# Patient Record
Sex: FEMALE | Race: WHITE | NOT HISPANIC OR LATINO | Employment: FULL TIME | URBAN - METROPOLITAN AREA
[De-identification: names, ages, dates, MRNs, and addresses within clinical notes are randomized per-mention and may not be internally consistent; named-entity substitution may affect disease eponyms.]

---

## 2017-03-21 ENCOUNTER — OFFICE VISIT (OUTPATIENT)
Dept: FAMILY MEDICINE | Facility: CLINIC | Age: 52
End: 2017-03-21
Payer: COMMERCIAL

## 2017-03-21 VITALS
RESPIRATION RATE: 15 BRPM | HEIGHT: 68 IN | OXYGEN SATURATION: 96 % | BODY MASS INDEX: 36.98 KG/M2 | DIASTOLIC BLOOD PRESSURE: 88 MMHG | HEART RATE: 79 BPM | TEMPERATURE: 98.4 F | WEIGHT: 244 LBS | SYSTOLIC BLOOD PRESSURE: 122 MMHG

## 2017-03-21 DIAGNOSIS — Z23 ENCOUNTER FOR IMMUNIZATION: ICD-10-CM

## 2017-03-21 DIAGNOSIS — E78.5 HYPERLIPIDEMIA LDL GOAL <130: ICD-10-CM

## 2017-03-21 DIAGNOSIS — Z12.4 PAP SMEAR FOR CERVICAL CANCER SCREENING: ICD-10-CM

## 2017-03-21 DIAGNOSIS — F41.9 ANXIETY: ICD-10-CM

## 2017-03-21 DIAGNOSIS — Z90.89 S/P PARATHYROIDECTOMY: ICD-10-CM

## 2017-03-21 DIAGNOSIS — Z12.31 VISIT FOR SCREENING MAMMOGRAM: ICD-10-CM

## 2017-03-21 DIAGNOSIS — Z72.0 TOBACCO ABUSE: ICD-10-CM

## 2017-03-21 DIAGNOSIS — Z12.4 SCREENING FOR MALIGNANT NEOPLASM OF CERVIX: ICD-10-CM

## 2017-03-21 DIAGNOSIS — Z80.0 FAMILY HISTORY OF COLON CANCER: ICD-10-CM

## 2017-03-21 DIAGNOSIS — G56.03 BILATERAL CARPAL TUNNEL SYNDROME: ICD-10-CM

## 2017-03-21 DIAGNOSIS — Z11.59 NEED FOR HEPATITIS C SCREENING TEST: ICD-10-CM

## 2017-03-21 DIAGNOSIS — Z00.00 ROUTINE GENERAL MEDICAL EXAMINATION AT A HEALTH CARE FACILITY: Primary | ICD-10-CM

## 2017-03-21 DIAGNOSIS — Z12.11 SCREEN FOR COLON CANCER: ICD-10-CM

## 2017-03-21 DIAGNOSIS — Z98.890 S/P PARATHYROIDECTOMY: ICD-10-CM

## 2017-03-21 DIAGNOSIS — Z23 NEED FOR PROPHYLACTIC VACCINATION AGAINST STREPTOCOCCUS PNEUMONIAE (PNEUMOCOCCUS): ICD-10-CM

## 2017-03-21 LAB
ALP SERPL-CCNC: 77 U/L (ref 40–150)
CALCIUM SERPL-MCNC: 8.9 MG/DL (ref 8.5–10.1)
CHOLEST SERPL-MCNC: 202 MG/DL
GLUCOSE SERPL-MCNC: 78 MG/DL (ref 70–99)
HDLC SERPL-MCNC: 48 MG/DL
LDLC SERPL CALC-MCNC: 138 MG/DL
NONHDLC SERPL-MCNC: 154 MG/DL
PTH-INTACT SERPL-MCNC: 41 PG/ML (ref 12–72)
TRIGL SERPL-MCNC: 81 MG/DL
TSH SERPL DL<=0.005 MIU/L-ACNC: 1.04 MU/L (ref 0.4–4)

## 2017-03-21 PROCEDURE — 82947 ASSAY GLUCOSE BLOOD QUANT: CPT | Performed by: FAMILY MEDICINE

## 2017-03-21 PROCEDURE — 83970 ASSAY OF PARATHORMONE: CPT | Performed by: FAMILY MEDICINE

## 2017-03-21 PROCEDURE — 36415 COLL VENOUS BLD VENIPUNCTURE: CPT | Performed by: FAMILY MEDICINE

## 2017-03-21 PROCEDURE — 99000 SPECIMEN HANDLING OFFICE-LAB: CPT | Performed by: FAMILY MEDICINE

## 2017-03-21 PROCEDURE — 80061 LIPID PANEL: CPT | Performed by: FAMILY MEDICINE

## 2017-03-21 PROCEDURE — G0009 ADMIN PNEUMOCOCCAL VACCINE: HCPCS | Performed by: FAMILY MEDICINE

## 2017-03-21 PROCEDURE — 90732 PPSV23 VACC 2 YRS+ SUBQ/IM: CPT | Performed by: FAMILY MEDICINE

## 2017-03-21 PROCEDURE — 87902 NFCT AGT GNTYP ALYS HEP C: CPT | Mod: 90 | Performed by: FAMILY MEDICINE

## 2017-03-21 PROCEDURE — 87522 HEPATITIS C REVRS TRNSCRPJ: CPT | Performed by: FAMILY MEDICINE

## 2017-03-21 PROCEDURE — 84443 ASSAY THYROID STIM HORMONE: CPT | Performed by: FAMILY MEDICINE

## 2017-03-21 PROCEDURE — 87624 HPV HI-RISK TYP POOLED RSLT: CPT | Performed by: FAMILY MEDICINE

## 2017-03-21 PROCEDURE — 99396 PREV VISIT EST AGE 40-64: CPT | Mod: 25 | Performed by: FAMILY MEDICINE

## 2017-03-21 PROCEDURE — G0145 SCR C/V CYTO,THINLAYER,RESCR: HCPCS | Performed by: FAMILY MEDICINE

## 2017-03-21 PROCEDURE — 86803 HEPATITIS C AB TEST: CPT | Performed by: FAMILY MEDICINE

## 2017-03-21 PROCEDURE — 84075 ASSAY ALKALINE PHOSPHATASE: CPT | Performed by: FAMILY MEDICINE

## 2017-03-21 PROCEDURE — 82310 ASSAY OF CALCIUM: CPT | Performed by: FAMILY MEDICINE

## 2017-03-21 RX ORDER — SERTRALINE HYDROCHLORIDE 100 MG/1
TABLET, FILM COATED ORAL
Qty: 30 TABLET | Refills: 6 | Status: SHIPPED | OUTPATIENT
Start: 2017-03-21 | End: 2017-06-21

## 2017-03-21 ASSESSMENT — ANXIETY QUESTIONNAIRES
2. NOT BEING ABLE TO STOP OR CONTROL WORRYING: SEVERAL DAYS
6. BECOMING EASILY ANNOYED OR IRRITABLE: MORE THAN HALF THE DAYS
1. FEELING NERVOUS, ANXIOUS, OR ON EDGE: SEVERAL DAYS
7. FEELING AFRAID AS IF SOMETHING AWFUL MIGHT HAPPEN: SEVERAL DAYS
GAD7 TOTAL SCORE: 8
3. WORRYING TOO MUCH ABOUT DIFFERENT THINGS: SEVERAL DAYS
5. BEING SO RESTLESS THAT IT IS HARD TO SIT STILL: SEVERAL DAYS
IF YOU CHECKED OFF ANY PROBLEMS ON THIS QUESTIONNAIRE, HOW DIFFICULT HAVE THESE PROBLEMS MADE IT FOR YOU TO DO YOUR WORK, TAKE CARE OF THINGS AT HOME, OR GET ALONG WITH OTHER PEOPLE: SOMEWHAT DIFFICULT

## 2017-03-21 ASSESSMENT — PATIENT HEALTH QUESTIONNAIRE - PHQ9: 5. POOR APPETITE OR OVEREATING: SEVERAL DAYS

## 2017-03-21 NOTE — LETTER
[facility]  [address] [place] [address]    March 24, 2017    Xenia Arguello  [address] [facility] [address]          Dear Xenia,  Advised That she make appointment with a Hepatologist at [facility]  Follow Low cholesterol diet.    Results for orders placed or performed in visit on 03/21/17   Hepatitis C Screen Reflex to HCV RNA Quant and Genotype   Result Value Ref Range    Hepatitis C Antibody (A) NR     Reactive   A reactive result indicates one of the following 1) current HCV infection 2)   past HCV infection that has resolved or 3) false positivity. The CDC recommends   that a reactive result should be followed by Nucleic acid testing for HCV RNA.  If HCV RNA is detected, that indicates current HCV infection. If HCV RNA is not   detected, that indicates either past, resolved HCV infection, or false HCV   antibody positivity.   Assay performance characteristics have not been established for newborns,   infants, and children     Lipid panel reflex to direct LDL   Result Value Ref Range    Cholesterol 202 (H) <200 mg/dL    Triglycerides 81 <150 mg/dL    HDL Cholesterol 48 (L) >49 mg/dL    LDL Cholesterol Calculated 138 (H) <100 mg/dL    Non HDL Cholesterol 154 (H) <130 mg/dL   Pap imaged thin layer screen with HPV - recommended age 30 - 65 years (select HPV order below)   Result Value Ref Range    PAP NIL     Copath Report         Patient Name: XENIA ARGUELLO  MR#: [id number]  Specimen #: [id number]  Collected: 3/21/2017  Received: 3/22/2017  Reported: 3/23/2017 12:30  Ordering Phy(s): JATINDER GOMEZ    For improved result formatting, select 'View Enhanced Report Format'  under Linked Documents section.    SPECIMEN/STAIN PROCESS:  Pap imaged thin layer prep screening (Surepath, FocalPoint with guided  screening)       Pap-Cyto x 1, HPV ordered x 1    SOURCE: Cervical,  endocervical  ----------------------------------------------------------------   Pap imaged thin layer prep screening (Surepath, FocalPoint with guided  screening)  SPECIMEN ADEQUACY:  Satisfactory for evaluation.  -Transformation zone component absent.    CYTOLOGIC INTERPRETATION:    Negative for Intraepithelial Lesion or Malignancy    Electronically signed out by:  MARIETTA Junior (ASCP)    Processed and screened at [facility]    CLINICAL HISTORY:    Post Menopausal,    Papanicolaou Test  Limitations:  Cervical cytology is a screening test  with limited sensitivity; regular screening is critical for cancer  prevention; Pap tests are primarily effective for the  diagnosis/prevention of squamous cell carcinoma, not adenocarcinomas or  other cancers.    TESTING LAB LOCATION:  [address]  [phone]    COLLECTION SITE:  Client:  [facility]  Location: [facility] ()     Glucose   Result Value Ref Range    Glucose 78 70 - 99 mg/dL   Calcium   Result Value Ref Range    Calcium 8.9 8.5 - 10.1 mg/dL   Parathyroid Hormone Intact   Result Value Ref Range    Parathyroid Hormone Intact 41 12 - 72 pg/mL   Alkaline phosphatase   Result Value Ref Range    Alkaline Phosphatase 77 40 - 150 U/L   TSH with free T4 reflex   Result Value Ref Range    TSH 1.04 0.40 - 4.00 mU/L   Hepatitis C RNA Quantitative   Result Value Ref Range    HCV RNA Quant IU/ml 6344533 (A) HCVND [IU]/mL    Log of HCV RNA Qt 6.8 (H) <1.2 Log IU/mL       If you have any questions or concerns, please me or my clinic team at [phone].      Sincerely,        Maricarmen Stephens MD/bt

## 2017-03-21 NOTE — PROGRESS NOTES
SUBJECTIVE:     CC: Eli Burton is an 51 year old woman who presents for preventive health visit.     Healthy Habits:    Do you get at least three servings of calcium containing foods daily (dairy, green leafy vegetables, etc.)? yes    Amount of exercise or daily activities, outside of work: no    Problems taking medications regularly No    Medication side effects: No    Have you had an eye exam in the past two years? no    Do you see a dentist twice per year? yes    Do you have sleep apnea, excessive snoring or daytime drowsiness?no        Medication refill    Today's PHQ-2 Score:   PHQ-2 ( 1999 Pfizer) 12/15/2016 1/28/2016   Q1: Little interest or pleasure in doing things 0 0   Q2: Feeling down, depressed or hopeless 0 0   PHQ-2 Score 0 0       Abuse: Current or Past(Physical, Sexual or Emotional)- No  Do you feel safe in your environment - Yes    Social History   Substance Use Topics     Smoking status: Current Every Day Smoker     Packs/day: 0.50     Types: Cigarettes     Smokeless tobacco: Never Used     Alcohol use 0.0 oz/week     0 Standard drinks or equivalent per week      Comment: 0-10 light beers weekly     The patient does not drink >3 drinks per day nor >7 drinks per week.    Recent Labs   Lab Test  09/12/14   0756   LDL  170*       Reviewed orders with patient.  Reviewed health maintenance and updated orders accordingly - Yes    Mammo Decision Support:  Patient over age 50, mutual decision to screen reflected in health maintenance.    Pertinent mammograms are reviewed under the imaging tab.  History of abnormal Pap smear: NO - age 30- 65 PAP every 3 years recommended    Reviewed and updated as needed this visit by clinical staff  Tobacco  Allergies  Meds  Med Hx  Surg Hx  Fam Hx  Soc Hx        Reviewed and updated as needed this visit by Provider        Past Medical History   Diagnosis Date     Anxiety      CTS (carpal tunnel syndrome) 7/20/2014     Depression      Headache       Hyperlipidemia LDL goal <130       Past Surgical History   Procedure Laterality Date     Parathyroidectomy N/A 12/23/2014     Procedure: PARATHYROIDECTOMY;  Surgeon: Anusha Chan MD;  Location:  OR       ROS:  C: NEGATIVE for fever, chills, change in weight  I: NEGATIVE for worrisome rashes, moles or lesions  E: NEGATIVE for vision changes or irritation  ENT: NEGATIVE for ear, mouth and throat problems  R: NEGATIVE for significant cough or SOB  B: NEGATIVE for masses, tenderness or discharge  CV: NEGATIVE for chest pain, palpitations or peripheral edema  GI: NEGATIVE for nausea, abdominal pain, heartburn, or change in bowel habits  : NEGATIVE for unusual urinary or vaginal symptoms. No vaginal bleeding.  M: NEGATIVE for significant arthralgias or myalgia  N: NEGATIVE for weakness, dizziness or paresthesias  P: NEGATIVE for changes in mood or affect     Problem list, Medication list, Allergies, and Medical/Social/Surgical histories reviewed in [facility] and updated as appropriate.  Labs reviewed in EPIC  BP Readings from Last 3 Encounters:   03/21/17 122/88   12/15/16 133/85   01/28/16 138/84    Wt Readings from Last 3 Encounters:   03/21/17 244 lb (110.7 kg)   12/15/16 244 lb (110.7 kg)   01/28/16 239 lb 9.6 oz (108.7 kg)                  Patient Active Problem List   Diagnosis     Tobacco abuse     Anxiety     CTS (carpal tunnel syndrome)     Trigger finger, acquired     S/P parathyroidectomy (H)     Hyperlipidemia LDL goal <130     Family history of colon cancer     Past Surgical History   Procedure Laterality Date     Parathyroidectomy N/A 12/23/2014     Procedure: PARATHYROIDECTOMY;  Surgeon: Anusha Chan MD;  Location:  OR       Social History   Substance Use Topics     Smoking status: Current Every Day Smoker     Packs/day: 0.50     Types: Cigarettes     Smokeless tobacco: Never Used     Alcohol use 0.0 oz/week     0 Standard drinks or equivalent per week      Comment: 0-10 light beers  "weekly     Family History   Problem Relation Age of Onset     Breast Cancer Maternal Grandmother      Cancer - colorectal Maternal Grandfather      CEREBROVASCULAR DISEASE No family hx of          Current Outpatient Prescriptions   Medication Sig Dispense Refill     sertraline (ZOLOFT) 100 MG tablet TAKE ONE TABLET BY MOUTH EVERY DAY -DUE FOR PHQ9 BEFORE FURTHER REFILLS 30 tablet 6     Multiple Vitamins-Minerals (MULTIVITAMIN OR) Take 1 tablet by mouth daily       [DISCONTINUED] sertraline (ZOLOFT) 100 MG tablet TAKE ONE TABLET BY MOUTH EVERY DAY -DUE FOR PHQ9 BEFORE FURTHER REFILLS 30 tablet 0     No Known Allergies  Recent Labs   Lab Test  07/23/15   0959  09/12/14   0756   LDL   --   170*   ALT  43   --    CR  0.69  0.85   GFRESTIMATED  90  71   GFRESTBLACK  >90   GFR Calc    86   POTASSIUM  4.6  4.5      OBJECTIVE:     /90  Pulse 79  Temp 98.4  F (36.9  C)  Resp 15  Ht 5' 7.9\" (1.725 m)  Wt 244 lb (110.7 kg)  SpO2 96%  BMI 37.21 kg/m2  EXAM:  GENERAL APPEARANCE: healthy, alert and no distress  EYES: Eyes grossly normal to inspection, PERRL and conjunctivae and sclerae normal  HENT: ear canals and TM's normal, nose and mouth without ulcers or lesions, oropharynx clear and oral mucous membranes moist  NECK: no adenopathy, no asymmetry, masses, or scars and thyroid normal to palpation  RESP: lungs clear to auscultation - no rales, rhonchi or wheezes  BREAST: normal without masses, tenderness or nipple discharge and no palpable axillary masses or adenopathy  CV: regular rate and rhythm, normal S1 S2, no S3 or S4, no murmur, click or rub, no peripheral edema and peripheral pulses strong  ABDOMEN: soft, nontender, no hepatosplenomegaly, no masses and bowel sounds normal   (female): normal female external genitalia, normal urethral meatus, vaginal mucosal atrophy noted, normal cervix, adnexae, and uterus without masses or abnormal discharge  MS: no musculoskeletal defects are noted and gait is " age appropriate without ataxia  SKIN: no suspicious lesions or rashes  NEURO: Normal strength and tone, sensory exam grossly normal, mentation intact and speech normal  PSYCH: mentation appears normal and affect normal/bright    ASSESSMENT/PLAN:     1. Routine general medical examination at a health care facility    - Glucose  - TSH with free T4 reflex    2. Anxiety  Refilled  Pt doing well  See DAVID  - sertraline (ZOLOFT) 100 MG tablet; TAKE ONE TABLET BY MOUTH EVERY DAY -DUE FOR PHQ9 BEFORE FURTHER REFILLS  Dispense: 30 tablet; Refill: 6    3. Screening for malignant neoplasm of cervix  Pap done    4. Need for hepatitis C screening test  Advised   - Hepatitis C Screen Reflex to HCV RNA Quant and Genotype    5. Need for prophylactic vaccination against Streptococcus pneumoniae (pneumococcus)  Advised     6. Hyperlipidemia LDL goal <130  Will check  - Lipid panel reflex to direct LDL    7. Tobacco abuse  Advised quit-Pt declined    8. Encounter for immunization  Advised   - PNEUMOVOCCAL VACCINE 23 VALENT (PNEUMOVAX 23)    9. Bilateral carpal tunnel syndrome  Mild  Avoid repetitive activities    10. Pap smear for cervical cancer screening  done  - Pap imaged thin layer screen with HPV - recommended age 30 - 65 years (select HPV order below)  - HPV High Risk Types DNA Cervical    11. Visit for screening mammogram  Advised   - *MA Screening Digital Bilateral; Future    12. S/P parathyroidectomy (H)  Will check labs  - Calcium  - Parathyroid Hormone Intact  - Alkaline phosphatase    13. Family history of colon cancer  Advised importance of colonoscopy as she has Family history  - GASTROENTEROLOGY ADULT REF PROCEDURE ONLY    14. Screen for colon cancer  Advised   - GASTROENTEROLOGY ADULT REF PROCEDURE ONLY    COUNSELING:   Reviewed preventive health counseling, as reflected in patient instructions       Regular exercise       Healthy diet/nutrition       Vision screening       Osteoporosis Prevention/Bone Health        "Colon cancer screening       Consider lung cancer screening for ages 55-80 years and 30 pack-year smoking history        One time pneumovax for smokers       The ASCVD Risk score (Jade DIAL Jr., et al., 2013) failed to calculate for the following reasons:    Cannot find a previous HDL lab    Cannot find a previous total cholesterol lab         reports that she has been smoking Cigarettes.  She has been smoking about 0.50 packs per day. She has never used smokeless tobacco.  Tobacco Cessation Action Plan: Information offered: Patient not interested at this time  Estimated body mass index is 37.21 kg/(m^2) as calculated from the following:    Height as of this encounter: 5' 7.9\" (1.725 m).    Weight as of this encounter: 244 lb (110.7 kg).   Weight management plan: low agus diet/exercise    Counseling Resources:  ATP IV Guidelines  Pooled Cohorts Equation Calculator  Breast Cancer Risk Calculator  FRAX Risk Assessment  ICSI Preventive Guidelines  Dietary Guidelines for Americans, 2010  USDA's MyPlate  ASA Prophylaxis  Lung CA Screening    Maricarmen Stephens MD  [facility]  "

## 2017-03-21 NOTE — MR AVS SNAPSHOT
After Visit Summary   3/21/2017    Eli Burton    MRN: [id number]           Patient Information     Date Of Birth          1965        Visit Information        Provider Department      3/21/2017 1:40 PM Maricarmen Stephens MD [facility]        Today's Diagnoses     Routine general medical examination at a health care facility    -  1    Anxiety        Screening for malignant neoplasm of cervix        Need for hepatitis C screening test        Need for prophylactic vaccination against Streptococcus pneumoniae (pneumococcus)        Hyperlipidemia LDL goal <130        Tobacco abuse        Encounter for immunization        Bilateral carpal tunnel syndrome        Pap smear for cervical cancer screening        Visit for screening mammogram        S/P parathyroidectomy (H)        Family history of colon cancer        Screen for colon cancer          Care Instructions    [facility]    If you have any questions regarding to your visit please contact your care team:       Team Red:   Clinic Hours Telephone Number   Dr. Constanza Reynoso  (pediatrics)  Kelly Bassett NP 7am-7pm  Monday - Thursday   7am-5pm  Fridays  [phone] (fax)    Harvey DALTON  [phone]   Urgent Care - [place] and [place] Monday-Friday  [place] - 11am-8pm  Saturday-Sunday  Both sites - 9am-5pm  [phone] - [facility]  [phone] - [place]       What options do I have for visits at the clinic other than the traditional office visit?  To expand how we care for you, many of our providers are utilizing electronic visits (e-visits) and telephone visits, when medically appropriate, for interactions with their patients rather than a visit in the clinic.   We also offer nurse visits for many medical concerns. Just like any other service, we will bill your insurance company for this type of visit based on time spent on the phone with your  provider. Not all insurance companies cover these visits. Please check with your medical insurance if this type of visit is covered. You will be responsible for any charges that are not paid by your insurance.      E-visits via [employer]:  generally incur a $35.00 fee.  Telephone visits:  Time spent on the phone: *charged based on time that is spent on the phone in increments of 10 minutes. Estimated cost:   5-10 mins $30.00   11-20 mins. $59.00   21-30 mins. $85.00     As always, Thank you for trusting us with your health care needs!      Azeb LIMON MA          Preventive Health Recommendations  Female Ages 50 - 64    Yearly exam: See your health care provider every year in order to  o Review health changes.   o Discuss preventive care.    o Review your medicines if your doctor has prescribed any.      Get a Pap test every three years (unless you have an abnormal result and your provider advises testing more often).    If you get Pap tests with HPV test, you only need to test every 5 years, unless you have an abnormal result.     You do not need a Pap test if your uterus was removed (hysterectomy) and you have not had cancer.    You should be tested each year for STDs (sexually transmitted diseases) if you're at risk.     Have a mammogram every 1 to 2 years.    Have a colonoscopy at age 50, or have a yearly FIT test (stool test). These exams screen for colon cancer.      Have a cholesterol test every 5 years, or more often if advised.    Have a diabetes test (fasting glucose) every three years. If you are at risk for diabetes, you should have this test more often.     If you are at risk for osteoporosis (brittle bone disease), think about having a bone density scan (DEXA).    Shots: Get a flu shot each year. Get a tetanus shot every 10 years.    Nutrition:     Eat at least 5 servings of fruits and vegetables each day.    Eat whole-grain bread, whole-wheat pasta and brown rice instead of white grains and rice.    Talk  to your provider about Calcium and Vitamin D.     Lifestyle    Exercise at least 150 minutes a week (30 minutes a day, 5 days a week). This will help you control your weight and prevent disease.    Limit alcohol to one drink per day.    No smoking.     Wear sunscreen to prevent skin cancer.     See your dentist every six months for an exam and cleaning.    See your eye doctor every 1 to 2 years.          Follow-ups after your visit        Additional Services     GASTROENTEROLOGY ADULT REF PROCEDURE ONLY       Last Lab Result: Creatinine (mg/dL)       Date                     Value                 07/23/2015               0.69             ----------  Body mass index is 37.21 kg/(m^2).     Needed:  No  Language:  English    Patient will be contacted to schedule procedure.     Please be aware that coverage of these services is subject to the terms and limitations of your health insurance plan.  Call member services at your health plan with any benefit or coverage questions.  Any procedures must be performed at a [place] facility OR coordinated by your clinic's referral office.    Please bring the following with you to your appointment:    (1) Any X-Rays, CTs or MRIs which have been performed.  Contact the facility where they were done to arrange for  prior to your scheduled appointment.    (2) List of current medications   (3) This referral request   (4) Any documents/labs given to you for this referral                  Future tests that were ordered for you today     Open Future Orders        Priority Expected Expires Ordered    *MA Screening Digital Bilateral Routine  3/21/2018 3/21/2017            Who to contact     If you have questions or need follow up information about today's clinic visit or your schedule please contact [facility] directly at [phone].  Normal or non-critical lab and imaging results will be communicated to you by MyChart, letter or phone within 4 business  "days after the clinic has received the results. If you do not hear from us within 7 days, please contact the clinic through iWarda or phone. If you have a critical or abnormal lab result, we will notify you by phone as soon as possible.  Submit refill requests through i[employer]arda or call your pharmacy and they will forward the refill request to us. Please allow 3 business days for your refill to be completed.          Additional Information About Your Visit        iWarda Information     iWarda lets you send messages to your doctor, view your test results, renew your prescriptions, schedule appointments and more. To sign up, go to [email or web address] . Click on \"Log in\" on the left side of the screen, which will take you to the Welcome page. Then click on \"Sign up Now\" on the right side of the page.     You will be asked to enter the access code listed below, as well as some personal information. Please follow the directions to create your username and password.     Your access code is: [email or web address]  Expires: 2017  2:29 PM     Your access code will  in 90 days. If you need help or a new code, please call your [place] clinic or [phone].        Care EveryWhere ID     This is your Care EveryWhere ID. This could be used by other organizations to access your [place] medical records  [id number]        Your Vitals Were     Pulse Temperature Respirations Height Pulse Oximetry BMI (Body Mass Index)    79 98.4  F (36.9  C) 15 5' 7.9\" (1.725 m) 96% 37.21 kg/m2       Blood Pressure from Last 3 Encounters:   17 122/88   12/15/16 133/85   16 138/84    Weight from Last 3 Encounters:   17 244 lb (110.7 kg)   12/15/16 244 lb (110.7 kg)   16 239 lb 9.6 oz (108.7 kg)              We Performed the Following     Alkaline phosphatase     Calcium     GASTROENTEROLOGY ADULT REF PROCEDURE ONLY     Glucose     Hepatitis C Screen Reflex to HCV RNA Quant and Genotype     HPV High Risk Types DNA " Cervical     Lipid panel reflex to direct LDL     Pap imaged thin layer screen with HPV - recommended age 30 - 65 years (select HPV order below)     Parathyroid Hormone Intact     PNEUMOVOCCAL VACCINE 23 VALENT (PNEUMOVAX 23)     TSH with free T4 reflex          Today's Medication Changes          These changes are accurate as of: 3/21/17  2:29 PM.  If you have any questions, ask your nurse or doctor.               These medicines have changed or have updated prescriptions.        Dose/Directions    sertraline 100 MG tablet   Commonly known as:  ZOLOFT   This may have changed:  See the new instructions.   Used for:  Anxiety   Changed by:  Maricarmen Stephens MD        TAKE ONE TABLET BY MOUTH EVERY DAY -DUE FOR PHQ9 BEFORE FURTHER REFILLS   Quantity:  30 tablet   Refills:  6            Where to get your medicines      These medications were sent to [facility] [address] [place] [address] [address], [facility] [address]     Phone:  [phone]     sertraline 100 MG tablet                Primary Care Provider Office Phone # Fax #    Wei Ybarra PA-C [phone]       [address] [address]        Thank you!     Thank you for choosing [facility]  for your care. Our goal is always to provide you with excellent care. Hearing back from our patients is one way we can continue to improve our services. Please take a few minutes to complete the written survey that you may receive in the mail after your visit with us. Thank you!             Your Updated Medication List - Protect others around you: Learn how to safely use, store and throw away your medicines at www.disposemymeds.org.          This list is accurate as of: 3/21/17  2:29 PM.  Always use your most recent med list.                   Brand Name Dispense Instructions for use    MULTIVITAMIN PO      Take 1 tablet by mouth daily       sertraline 100  MG tablet    ZOLOFT    30 tablet    TAKE ONE TABLET BY MOUTH EVERY DAY -DUE FOR PHQ9 BEFORE FURTHER REFILLS

## 2017-03-21 NOTE — LETTER
[facility]  [address] [address]  [phone]      March 29, 2017    Eli Burton  [address] [facility] [address]    Dear Eli,  We are happy to inform you that your PAP smear result from 3/21/17 is normal.  We are now able to do a follow up test on PAP smears. The DNA test is for HPV (Human Papilloma Virus). Cervical cancer is closely linked with certain types of HPV. Your result showed no evidence of high risk HPV.  Therefore we recommend you return in 3 years for your next pap smear.  You will still need to return to the clinic every year for an annual exam and other preventive tests.  Please contact the clinic with any questions.  Sincerely,  Maricarmen Stephens MD/giacomo

## 2017-03-21 NOTE — NURSING NOTE
"Chief Complaint   Patient presents with     Physical       Initial /90  Pulse 79  Temp 98.4  F (36.9  C)  Resp 15  Ht 5' 7.9\" (1.725 m)  Wt 244 lb (110.7 kg)  SpO2 96%  BMI 37.21 kg/m2 Estimated body mass index is 37.21 kg/(m^2) as calculated from the following:    Height as of this encounter: 5' 7.9\" (1.725 m).    Weight as of this encounter: 244 lb (110.7 kg).  Medication Reconciliation: complete     Bhavin Martinez. MA      "

## 2017-03-21 NOTE — PATIENT INSTRUCTIONS
[facility]    If you have any questions regarding to your visit please contact your care team:       Team Red:   Clinic Hours Telephone Number   Dr. Constanza Reynoso  (pediatrics)  Kelly Bassett NP 7am-7pm  Monday - Thursday   7am-5pm  Fridays  [phone] (fax)    Harvey DALTON  [phone]   Urgent Care - [place] and [place] Monday-Friday  [place] - 11am-8pm  Saturday-Sunday  Both sites - 9am-5pm  [phone] - [facility] [place]  [phone] - [place]       What options do I have for visits at the clinic other than the traditional office visit?  To expand how we care for you, many of our providers are utilizing electronic visits (e-visits) and telephone visits, when medically appropriate, for interactions with their patients rather than a visit in the clinic.   We also offer nurse visits for many medical concerns. Just like any other service, we will bill your insurance company for this type of visit based on time spent on the phone with your provider. Not all insurance companies cover these visits. Please check with your medical insurance if this type of visit is covered. You will be responsible for any charges that are not paid by your insurance.      E-visits via [employer]:  generally incur a $35.00 fee.  Telephone visits:  Time spent on the phone: *charged based on time that is spent on the phone in increments of 10 minutes. Estimated cost:   5-10 mins $30.00   11-20 mins. $59.00   21-30 mins. $85.00     As always, Thank you for trusting us with your health care needs!      Azeb LIMON MA          Preventive Health Recommendations  Female Ages 50 - 64    Yearly exam: See your health care provider every year in order to  o Review health changes.   o Discuss preventive care.    o Review your medicines if your doctor has prescribed any.      Get a Pap test every three years (unless you have an abnormal result and your provider advises testing  more often).    If you get Pap tests with HPV test, you only need to test every 5 years, unless you have an abnormal result.     You do not need a Pap test if your uterus was removed (hysterectomy) and you have not had cancer.    You should be tested each year for STDs (sexually transmitted diseases) if you're at risk.     Have a mammogram every 1 to 2 years.    Have a colonoscopy at age 50, or have a yearly FIT test (stool test). These exams screen for colon cancer.      Have a cholesterol test every 5 years, or more often if advised.    Have a diabetes test (fasting glucose) every three years. If you are at risk for diabetes, you should have this test more often.     If you are at risk for osteoporosis (brittle bone disease), think about having a bone density scan (DEXA).    Shots: Get a flu shot each year. Get a tetanus shot every 10 years.    Nutrition:     Eat at least 5 servings of fruits and vegetables each day.    Eat whole-grain bread, whole-wheat pasta and brown rice instead of white grains and rice.    Talk to your provider about Calcium and Vitamin D.     Lifestyle    Exercise at least 150 minutes a week (30 minutes a day, 5 days a week). This will help you control your weight and prevent disease.    Limit alcohol to one drink per day.    No smoking.     Wear sunscreen to prevent skin cancer.     See your dentist every six months for an exam and cleaning.    See your eye doctor every 1 to 2 years.

## 2017-03-22 LAB — HCV AB SERPL QL IA: ABNORMAL

## 2017-03-22 ASSESSMENT — PATIENT HEALTH QUESTIONNAIRE - PHQ9: SUM OF ALL RESPONSES TO PHQ QUESTIONS 1-9: 4

## 2017-03-22 ASSESSMENT — ANXIETY QUESTIONNAIRES: GAD7 TOTAL SCORE: 8

## 2017-03-23 DIAGNOSIS — B17.10 ACUTE HEPATITIS C VIRUS INFECTION WITHOUT HEPATIC COMA: Primary | ICD-10-CM

## 2017-03-23 LAB
COPATH REPORT: NORMAL
HCV RNA SERPL NAA+PROBE-ACNC: ABNORMAL [IU]/ML
HCV RNA SERPL NAA+PROBE-LOG IU: 6.8 LOG IU/ML
PAP: NORMAL

## 2017-03-24 ENCOUNTER — RADIANT APPOINTMENT (OUTPATIENT)
Dept: MAMMOGRAPHY | Facility: CLINIC | Age: 52
End: 2017-03-24
Attending: FAMILY MEDICINE
Payer: COMMERCIAL

## 2017-03-24 DIAGNOSIS — R94.5 ABNORMAL RESULTS OF LIVER FUNCTION STUDIES: Primary | ICD-10-CM

## 2017-03-24 DIAGNOSIS — Z12.31 VISIT FOR SCREENING MAMMOGRAM: ICD-10-CM

## 2017-03-24 LAB
FINAL DIAGNOSIS: NORMAL
HPV HR 12 DNA CVX QL NAA+PROBE: NEGATIVE
HPV16 DNA SPEC QL NAA+PROBE: NEGATIVE
HPV18 DNA SPEC QL NAA+PROBE: NEGATIVE
SPECIMEN DESCRIPTION: NORMAL

## 2017-03-24 PROCEDURE — G0202 SCR MAMMO BI INCL CAD: HCPCS | Mod: TC

## 2017-03-27 ENCOUNTER — TELEPHONE (OUTPATIENT)
Dept: GASTROENTEROLOGY | Facility: CLINIC | Age: 52
End: 2017-03-27

## 2017-03-27 DIAGNOSIS — R94.5 ABNORMAL RESULTS OF LIVER FUNCTION STUDIES: ICD-10-CM

## 2017-03-27 LAB
ALBUMIN SERPL-MCNC: 4 G/DL (ref 3.4–5)
ALP SERPL-CCNC: 73 U/L (ref 40–150)
ALT SERPL W P-5'-P-CCNC: 43 U/L (ref 0–50)
ANION GAP SERPL CALCULATED.3IONS-SCNC: 9 MMOL/L (ref 3–14)
AST SERPL W P-5'-P-CCNC: 43 U/L (ref 0–45)
BILIRUB DIRECT SERPL-MCNC: <0.1 MG/DL (ref 0–0.2)
BILIRUB SERPL-MCNC: 0.3 MG/DL (ref 0.2–1.3)
BUN SERPL-MCNC: 16 MG/DL (ref 7–30)
CALCIUM SERPL-MCNC: 9.1 MG/DL (ref 8.5–10.1)
CHLORIDE SERPL-SCNC: 104 MMOL/L (ref 94–109)
CO2 SERPL-SCNC: 25 MMOL/L (ref 20–32)
CREAT SERPL-MCNC: 0.8 MG/DL (ref 0.52–1.04)
ERYTHROCYTE [DISTWIDTH] IN BLOOD BY AUTOMATED COUNT: 12.6 % (ref 10–15)
GFR SERPL CREATININE-BSD FRML MDRD: 75 ML/MIN/1.7M2
GLUCOSE SERPL-MCNC: 79 MG/DL (ref 70–99)
HCT VFR BLD AUTO: 48 % (ref 35–47)
HGB BLD-MCNC: 16.6 G/DL (ref 11.7–15.7)
INR PPP: 1 (ref 0.86–1.14)
MCH RBC QN AUTO: 31.9 PG (ref 26.5–33)
MCHC RBC AUTO-ENTMCNC: 34.6 G/DL (ref 31.5–36.5)
MCV RBC AUTO: 92 FL (ref 78–100)
PLATELET # BLD AUTO: 233 10E9/L (ref 150–450)
POTASSIUM SERPL-SCNC: 3.9 MMOL/L (ref 3.4–5.3)
PROT SERPL-MCNC: 8.2 G/DL (ref 6.8–8.8)
RBC # BLD AUTO: 5.21 10E12/L (ref 3.8–5.2)
SODIUM SERPL-SCNC: 138 MMOL/L (ref 133–144)
WBC # BLD AUTO: 7.5 10E9/L (ref 4–11)

## 2017-03-27 PROCEDURE — 86704 HEP B CORE ANTIBODY TOTAL: CPT | Performed by: NURSE PRACTITIONER

## 2017-03-27 PROCEDURE — 80076 HEPATIC FUNCTION PANEL: CPT | Performed by: NURSE PRACTITIONER

## 2017-03-27 PROCEDURE — 36415 COLL VENOUS BLD VENIPUNCTURE: CPT | Performed by: NURSE PRACTITIONER

## 2017-03-27 PROCEDURE — 87389 HIV-1 AG W/HIV-1&-2 AB AG IA: CPT | Performed by: NURSE PRACTITIONER

## 2017-03-27 PROCEDURE — 85610 PROTHROMBIN TIME: CPT | Performed by: NURSE PRACTITIONER

## 2017-03-27 PROCEDURE — 85027 COMPLETE CBC AUTOMATED: CPT | Performed by: NURSE PRACTITIONER

## 2017-03-27 PROCEDURE — 80048 BASIC METABOLIC PNL TOTAL CA: CPT | Performed by: NURSE PRACTITIONER

## 2017-03-27 NOTE — TELEPHONE ENCOUNTER
----- Message from Brynn Beach LPN sent at 3/24/2017  4:51 PM CDT -----  Regarding: FW: Lab Orders Needed - Leeroy Bowers  Contact: [phone]      ----- Message -----     From: Patrica Mckinney     Sent: 3/24/2017   2:35 PM       To: Hepatology Nurses-  Subject: Lab Orders Needed - Leeroy Bowers                The pt will get her labs done at her home [facility] so would like the lab orders added so she can schedule.  Please call the pt at [phone] with the status.    Thanks - Patrica    Please DO NOT send this message and/or reply back to sender.  Call Center Representatives DO NOT respond to messages.

## 2017-03-28 LAB — HCV GENTYP SERPL NAA+PROBE: NORMAL

## 2017-03-29 ENCOUNTER — OFFICE VISIT (OUTPATIENT)
Dept: GASTROENTEROLOGY | Facility: CLINIC | Age: 52
End: 2017-03-29
Attending: NURSE PRACTITIONER
Payer: COMMERCIAL

## 2017-03-29 VITALS
HEIGHT: 68 IN | OXYGEN SATURATION: 96 % | SYSTOLIC BLOOD PRESSURE: 140 MMHG | TEMPERATURE: 98.3 F | BODY MASS INDEX: 36.37 KG/M2 | RESPIRATION RATE: 18 BRPM | WEIGHT: 240 LBS | DIASTOLIC BLOOD PRESSURE: 94 MMHG | HEART RATE: 77 BPM

## 2017-03-29 DIAGNOSIS — B18.2 CHRONIC HEPATITIS C WITHOUT HEPATIC COMA (H): Primary | ICD-10-CM

## 2017-03-29 PROCEDURE — 99213 OFFICE O/P EST LOW 20 MIN: CPT | Mod: ZF

## 2017-03-29 ASSESSMENT — PAIN SCALES - GENERAL: PAINLEVEL: NO PAIN (0)

## 2017-03-29 NOTE — NURSING NOTE
"Chief Complaint   Patient presents with     Consult     Hep C, new dx       Initial BP (!) 140/94 (BP Location: Right arm, Patient Position: Chair, Cuff Size: Adult Large)  Pulse 77  Temp 98.3  F (36.8  C) (Oral)  Resp 18  Ht 1.725 m (5' 7.91\")  Wt 108.9 kg (240 lb)  SpO2 96%  BMI 36.59 kg/m2 Estimated body mass index is 36.59 kg/(m^2) as calculated from the following:    Height as of this encounter: 1.725 m (5' 7.91\").    Weight as of this encounter: 108.9 kg (240 lb).  Medication Reconciliation: complete    "

## 2017-03-29 NOTE — PROGRESS NOTES
NEW PATIENT VISIT NOTE      REASON FOR VISIT:  New hepatitis C diagnosis.      HISTORY OF PRESENT ILLNESS:  Ms. Eli Burton is a 51-year-old  female who has just given a new diagnosis of chronic hepatitis C infection.  She does have hepatitis C genotype 1b.  This is a brand new diagnosis for her.  We do not have any up-to-date imaging.  She, of course, is treatment naive.  In clinic today, she indicates that she is generally doing fairly well, other than is anxious about this new diagnosis.  She does not have any abdominal pain, nausea or vomiting, rare heartburn.  Her appetite is really good, and her weight is stable.  She does not have any problems with diarrhea, constipation or GI bleeding.  She is sleeping well.  Her energy is a little bit low.  She does not have depression, but does have a longstanding history of anxiety for which she is on sertraline.  She has had vaccinations for hepatitis B in the past.  Her immunity to hepatitis A, previous exposure to hepatitis B, and HIV status at this time are not known.  Of note, Ms. Burton is not entirely sure how she might have been exposed to hepatitis C, but does indicate that approximately 30 years ago she did try IV drugs.  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      PAST MEDICAL HEALTH HISTORY:  Significant for anxiety and hepatitis C.      PAST SURGICAL HISTORY:  Includes parathyroid gland removal surgery in 2014.      SOCIAL HISTORY:  She is .  She does not know if her  has hepatitis C (he had a physical last year, but does not recall hearing his hepatitis C screening results; he is a baby boomer).  She does not have any children.  She smokes a half a pack of cigarettes per day.  She does drink alcohol on the weekends, anywhere between 9 and 11 alcoholic beverages.  Occasionally, she has some alcohol during the week.  This has been a standard amount for her for many decades.       FAMILY HEALTH HISTORY:  Both of her parents are living and are 72.  Neither of them have any liver or GI issues.  Her has a sister.  She does not have any liver or GI issues that she is aware of.      REVIEW OF SYSTEMS:  A comprehensive review of systems is negative, unless noted above.      ALLERGIES AND MEDICATIONS:  Listed in the electronic medical record.      PHYSICAL EXAMINATION:   VITAL SIGNS:  Blood pressure 140/94, heart rate 77 beats/minute, respiratory rate 18, temperature 98.3 orally, O2 sat 96% on room air.   HEENT:  Anicteric sclerae.  Oropharynx is clear.   NECK:  Supple, no lymphadenopathy.   PULMONARY:  Clear to auscultation.  No crackles or rales noted.   CARDIOVASCULAR:  S1, S2, regular rate and rhythm, no murmur or gallop appreciated.   ABDOMEN:  Soft, rounded, nontender to palpation.  No spleen tip palpable.  Liver size estimated at 1 fingerbreadth below the right costal margin.   EXTREMITIES:  Shows no clubbing, cyanosis or pitting edema.   NEUROLOGIC:  Grossly intact.   PSYCHIATRIC:  Normal speech, normal affect, normal memory.      LABORATORY DATA:  Her most recent laboratory tests results from 03/2017 showed the following:  White count 7.5, hemoglobin 16.6, platelet count 233,000.  INR 1.  Liver panel entirely normal.  Basic metabolic panel entirely normal.  Hepatitis C RNA quantitative level 5.8 million international unit/mL.  Hepatitis C genotype 1b.      ASSESSMENT:  Chronic hepatitis C infection.      PLAN:  At this time, I spent time talking with Ms. Burton about the natural history of hepatitis C infection, transmission modes and prevention measures, indications for treatment, as well as evaluation steps needed to be done before treatment can be planned.  I did advise that she abstain from alcohol.  She thinks that she will be able to abstain on her own, and is open to this.  I also did encourage her to try to be more physically active, as well as work on smoking cessation as  additional healthy lifestyle changes that would benefit her liver.  I did advise that she complete an abdominal ultrasound in [facility] sometime in the next couple of weeks.  I have also gotten her permission to add on an HIV test, hepatitis B core antibody test, as well as ascertain her immunity to hepatitis A.  I did advise that she have her  screened for hepatitis C.  I would like to see her back in 1 month to go over her tests, but we did talk about the fact that insurance does play a very big role in approving medication, and they might like to see anywhere between 3-6 months of sobriety.  She does understand that hepatitis C is very slow in progression, and her liver tests look excellent.  She also understands that alcohol can accelerate the damage, and she is going to try to not have that exposure and risk, as well as will also work on smoking cessation.  We did spend time briefly talking about her higher hemoglobin, and the fact that smoking can do this.  Ideally, her primary care provider can address this at a future visit.  All of her questions were addressed.      Forty minutes were spent with this patient, greater than 50% spent in counseling and coordination of care.       cc: Maricarmen Stephens MD

## 2017-03-29 NOTE — PATIENT INSTRUCTIONS
Abdominal ultrasound and Fibroscan in the near future.    Leeroy Bowers is trying to add on labs as discussed. If unable, we will arrange for you to get these additional labs another time (when you come for the imaging studies).    Follow up appointment with Leeroy Bowers CNP in 1 month.    Abstain from alcohol as discussed.     Please ask your  to get screened for Hepatitis C.    If you have any questions or concerns about your last test results or plan of care, please call our clinic at [phone].

## 2017-03-29 NOTE — LETTER
3/29/2017       RE: Eli Burton  [address]  [facility] [address]     Dear Colleague,    Thank you for referring your patient, Eli Burton, to the [facility]PATOLOG[facility] at [facility]. Please see a copy of my visit note below.    NEW PATIENT VISIT NOTE      REASON FOR VISIT:  New hepatitis C diagnosis.      HISTORY OF PRESENT ILLNESS:  Ms. Eli Burton is a 51-year-old  female who has just given a new diagnosis of chronic hepatitis C infection.  She does have hepatitis C genotype 1b.  This is a brand new diagnosis for her.  We do not have any up-to-date imaging.  She, of course, is treatment naive.  In clinic today, she indicates that she is generally doing fairly well, other than is anxious about this new diagnosis.  She does not have any abdominal pain, nausea or vomiting, rare heartburn.  Her appetite is really good, and her weight is stable.  She does not have any problems with diarrhea, constipation or GI bleeding.  She is sleeping well.  Her energy is a little bit low.  She does not have depression, but does have a longstanding history of anxiety for which she is on sertraline.  She has had vaccinations for hepatitis B in the past.  Her immunity to hepatitis A, previous exposure to hepatitis B, and HIV status at this time are not known.  Of note, Ms. Burton is not entirely sure how she might have been exposed to hepatitis C, but does indicate that approximately 30 years ago she did try IV drugs.  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      PAST MEDICAL HEALTH HISTORY:  Significant for anxiety and hepatitis C.      PAST SURGICAL HISTORY:  Includes parathyroid gland removal surgery in 2014.      SOCIAL HISTORY:  She is .  She does not know if her  has hepatitis C (he had a physical last year, but does not recall hearing his hepatitis C screening results; he is a baby  mary alice).  She does not have any children.  She smokes a half a pack of cigarettes per day.  She does drink alcohol on the weekends, anywhere between 9 and 11 alcoholic beverages.  Occasionally, she has some alcohol during the week.  This has been a standard amount for her for many decades.      FAMILY HEALTH HISTORY:  Both of her parents are living and are 72.  Neither of them have any liver or GI issues.  Her has a sister.  She does not have any liver or GI issues that she is aware of.      REVIEW OF SYSTEMS:  A comprehensive review of systems is negative, unless noted above.      ALLERGIES AND MEDICATIONS:  Listed in the electronic medical record.      PHYSICAL EXAMINATION:   VITAL SIGNS:  Blood pressure 140/94, heart rate 77 beats/minute, respiratory rate 18, temperature 98.3 orally, O2 sat 96% on room air.   HEENT:  Anicteric sclerae.  Oropharynx is clear.   NECK:  Supple, no lymphadenopathy.   PULMONARY:  Clear to auscultation.  No crackles or rales noted.   CARDIOVASCULAR:  S1, S2, regular rate and rhythm, no murmur or gallop appreciated.   ABDOMEN:  Soft, rounded, nontender to palpation.  No spleen tip palpable.  Liver size estimated at 1 fingerbreadth below the right costal margin.   EXTREMITIES:  Shows no clubbing, cyanosis or pitting edema.   NEUROLOGIC:  Grossly intact.   PSYCHIATRIC:  Normal speech, normal affect, normal memory.      LABORATORY DATA:  Her most recent laboratory tests results from 03/2017 showed the following:  White count 7.5, hemoglobin 16.6, platelet count 233,000.  INR 1.  Liver panel entirely normal.  Basic metabolic panel entirely normal.  Hepatitis C RNA quantitative level 5.8 million international unit/mL.  Hepatitis C genotype 1b.      ASSESSMENT:  Chronic hepatitis C infection.      PLAN:  At this time, I spent time talking with Ms. Burton about the natural history of hepatitis C infection, transmission modes and prevention measures, indications for treatment, as well as  evaluation steps needed to be done before treatment can be planned.  I did advise that she abstain from alcohol.  She thinks that she will be able to abstain on her own, and is open to this.  I also did encourage her to try to be more physically active, as well as work on smoking cessation as additional healthy lifestyle changes that would benefit her liver.  I did advise that she complete an abdominal ultrasound in [facility] sometime in the next couple of weeks.  I have also gotten her permission to add on an HIV test, hepatitis B core antibody test, as well as ascertain her immunity to hepatitis A.  I did advise that she have her  screened for hepatitis C.  I would like to see her back in 1 month to go over her tests, but we did talk about the fact that insurance does play a very big role in approving medication, and they might like to see anywhere between 3-6 months of sobriety.  She does understand that hepatitis C is very slow in progression, and her liver tests look excellent.  She also understands that alcohol can accelerate the damage, and she is going to try to not have that exposure and risk, as well as will also work on smoking cessation.  We did spend time briefly talking about her higher hemoglobin, and the fact that smoking can do this.  Ideally, her primary care provider can address this at a future visit.  All of her questions were addressed.      Forty minutes were spent with this patient, greater than 50% spent in counseling and coordination of care.       cc: Maricarmen Stephens MD         Again, thank you for allowing me to participate in the care of your patient.      Sincerely,    Leeroy Bowers NP

## 2017-03-30 LAB
HBV CORE AB SERPL QL IA: NONREACTIVE
HIV 1+2 AB+HIV1 P24 AG SERPL QL IA: NORMAL

## 2017-04-06 ENCOUNTER — APPOINTMENT (OUTPATIENT)
Dept: GASTROENTEROLOGY | Facility: CLINIC | Age: 52
End: 2017-04-06
Attending: NURSE PRACTITIONER
Payer: COMMERCIAL

## 2017-04-06 DIAGNOSIS — B18.2 CHRONIC HEPATITIS C WITHOUT HEPATIC COMA (H): ICD-10-CM

## 2017-04-06 LAB — HAV IGG SER QL IA: NORMAL

## 2017-04-06 PROCEDURE — 36415 COLL VENOUS BLD VENIPUNCTURE: CPT | Performed by: NURSE PRACTITIONER

## 2017-04-06 PROCEDURE — 86708 HEPATITIS A ANTIBODY: CPT | Performed by: NURSE PRACTITIONER

## 2017-04-27 ENCOUNTER — OFFICE VISIT (OUTPATIENT)
Dept: GASTROENTEROLOGY | Facility: CLINIC | Age: 52
End: 2017-04-27
Attending: NURSE PRACTITIONER
Payer: COMMERCIAL

## 2017-04-27 VITALS
TEMPERATURE: 98.3 F | SYSTOLIC BLOOD PRESSURE: 133 MMHG | HEART RATE: 73 BPM | BODY MASS INDEX: 35.7 KG/M2 | HEIGHT: 69 IN | WEIGHT: 241 LBS | DIASTOLIC BLOOD PRESSURE: 86 MMHG | OXYGEN SATURATION: 94 %

## 2017-04-27 DIAGNOSIS — B18.2 CHRONIC HEPATITIS C WITHOUT HEPATIC COMA (H): Primary | ICD-10-CM

## 2017-04-27 PROCEDURE — 99212 OFFICE O/P EST SF 10 MIN: CPT | Mod: ZF

## 2017-04-27 ASSESSMENT — PAIN SCALES - GENERAL: PAINLEVEL: NO PAIN (0)

## 2017-04-27 NOTE — PROGRESS NOTES
REASON FOR VISIT:  Followup chronic hepatitis C infection.      HISTORY OF PRESENT ILLNESS:  Ms. Eli Burton is a pleasant 51-year-old  female who has a history of chronic hepatitis C infection.  She does have hepatitis C genotype 1B.  She is treatment naive.  She has not undergone a liver biopsy but she did undergo a liver fibrosis scan in early 04/2017 which showed stage 0 fibrosis.  She also completed an abdominal ultrasound then which was entirely normal.  Of note, she is working on sobriety.  She has completely abstained from alcohol since her visit here on 03/29.  This is going well.  She did complete screening tests and does not currently have immunity to hepatitis A.  She has never had hepatitis B.  Her HIV test is negative.  Per her, she has completed vaccinations for hepatitis B in the past.      ALLERGIES AND MEDICATIONS:  Listed in the electronic medical record.      VITAL SIGNS:  Blood pressure 133/86, heart rate 73 beats per minute, temperature 98.3 orally, O2 sat 94% on room air.      LABORATORY DATA:  Her most recent laboratory test results from 03/27/2017 showed an entirely normal liver panel.      ASSESSMENT:  Chronic hepatitis C infection.      PLAN:  Ms. Burton is doing remarkably well at this time without any evidence of fibrosis and an entirely normal liver panel.  She was congratulated on her commitment to sobriety and encouraged to abstain from all alcohol entirely for the next 5 months.  I have asked her to get an appointment with my colleague, Dr. Elliot Garg, in 5 months, at which time they can discuss her candidacy for hepatitis C treatment, which then I think would be excellent.  I did send her home with [employer] Research Study and consent form and encouraged her to read that over now and consider that for her appointment with Dr. Garg.  I did talk with her about vaccinations for hepatitis A.  She indicates that she wants to think it over.  Again, I think going slow  and having her work on sobriety now is excellent.  Hopefully, she can get through treatment and ideally then be discharged from the [facility] long-term if she is successful in achieving a sustained virologic response.  All of her questions were addressed.      Fifteen minutes were spent with this patient, greater than 50% spent in counseling and coordination of care.      cc:  Maricarmen Stephens M.D.

## 2017-04-27 NOTE — PATIENT INSTRUCTIONS
Keep up the excellent lifestyle changes as you are.    Follow up appointment with Dr Garg in 5 months.     Review the Prioritize research study. Discuss with Dr Garg at next visit.     If you have any questions or concerns about your last test results or plan of care, please call our clinic at [phone].

## 2017-04-27 NOTE — NURSING NOTE
Chief Complaint   Patient presents with     RECHECK     Hepatitis C   Pt roomed, vitals, meds, and allergies reviewed with pt. Pt ready for provider.  Mahin Manriquez, CMA

## 2017-04-27 NOTE — LETTER
4/27/2017      RE: Eli Burton  [address]  [facility] [address]       REASON FOR VISIT:  Followup chronic hepatitis C infection.      HISTORY OF PRESENT ILLNESS:  Ms. Eli Burton is a pleasant 51-year-old  female who has a history of chronic hepatitis C infection.  She does have hepatitis C genotype 1B.  She is treatment naive.  She has not undergone a liver biopsy but she did undergo a liver fibrosis scan in early 04/2017 which showed stage 0 fibrosis.  She also completed an abdominal ultrasound then which was entirely normal.  Of note, she is working on sobriety.  She has completely abstained from alcohol since her visit here on 03/29.  This is going well.  She did complete screening tests and does not currently have immunity to hepatitis A.  She has never had hepatitis B.  Her HIV test is negative.  Per her, she has completed vaccinations for hepatitis B in the past.      ALLERGIES AND MEDICATIONS:  Listed in the electronic medical record.      VITAL SIGNS:  Blood pressure 133/86, heart rate 73 beats per minute, temperature 98.3 orally, O2 sat 94% on room air.      LABORATORY DATA:  Her most recent laboratory test results from 03/27/2017 showed an entirely normal liver panel.      ASSESSMENT:  Chronic hepatitis C infection.      PLAN:  Ms. Burton is doing remarkably well at this time without any evidence of fibrosis and an entirely normal liver panel.  She was congratulated on her commitment to sobriety and encouraged to abstain from all alcohol entirely for the next 5 months.  I have asked her to get an appointment with my colleague, Dr. Elliot Garg, in 5 months, at which time they can discuss her candidacy for hepatitis C treatment, which then I think would be excellent.  I did send her home with Prioritize Research Study and consent form and encouraged her to read that over now and consider that for her appointment with Dr. Garg.  I did talk with her about vaccinations for  hepatitis A.  She indicates that she wants to think it over.  Again, I think going slow and having her work on sobriety now is excellent.  Hopefully, she can get through treatment and ideally then be discharged from the [facility] long-term if she is successful in achieving a sustained virologic response.  All of her questions were addressed.      Fifteen minutes were spent with this patient, greater than 50% spent in counseling and coordination of care.      cc:  Maricarmen Stephens M.D.         Leeroy Bowers NP

## 2017-04-27 NOTE — MR AVS SNAPSHOT
After Visit Summary   4/27/2017    Eli Burton    MRN: [id number]           Patient Information     Date Of Birth          1965        Visit Information        Provider Department      4/27/2017 3:30 PM Leeroy Bowers NP [facility]        Care Instructions    Keep up the excellent lifestyle changes as you are.    Follow up appointment with Dr Garg in 5 months.     Review the Prioritize research study. Discuss with Dr Garg at next visit.     If you have any questions or concerns about your last test results or plan of care, please call our clinic at [phone].           Follow-ups after your visit        Your next 10 appointments already scheduled     Sep 25, 2017  3:30 PM CDT   Lab with  LAB   [facility] ([facility])    [address] [place] [id number] [phone]            Sep 25, 2017  4:30 PM CDT   (Arrive by 4:15 PM)   Return General Liver with Elliot Garg MD   [facility] ([facility])    [address] [place] [address] [phone]              Who to contact     If you have questions or need follow up information about today's clinic visit or your schedule please contact [facility] directly at [phone].  Normal or non-critical lab and imaging results will be communicated to you by MyChart, letter or phone within 4 business days after the clinic has received the results. If you do not hear from us within 7 days, please contact the clinic through MyChart or phone. If you have a critical or abnormal lab result, we will notify you by phone as soon as possible.  Submit refill requests through Cryptopay or call your pharmacy and they will forward the refill request to us. Please allow 3 business days for your refill to be completed.          Additional Information About Your Visit        Care EveryWhere ID     This is your Care  "EveryWhere ID. This could be used by other organizations to access your [facility] medical records  [id number]        Your Vitals Were     Pulse Temperature Height Pulse Oximetry BMI (Body Mass Index)       73 98.3  F (36.8  C) (Oral) 1.753 m (5' 9\") 94% 35.59 kg/m2        Blood Pressure from Last 3 Encounters:   04/27/17 133/86   03/29/17 (!) 140/94   03/21/17 122/88    Weight from Last 3 Encounters:   04/27/17 109.3 kg (241 lb)   03/29/17 108.9 kg (240 lb)   03/21/17 110.7 kg (244 lb)              Today, you had the following     No orders found for display       Primary Care Provider Office Phone # Fax #    Maricarmen Stephens [phone]       [address] [address]        Thank you!     Thank you for choosing [facility]  for your care. Our goal is always to provide you with excellent care. Hearing back from our patients is one way we can continue to improve our services. Please take a few minutes to complete the written survey that you may receive in the mail after your visit with us. Thank you!             Your Updated Medication List - Protect others around you: Learn how to safely use, store and throw away your medicines at www.disposemymeds.org.          This list is accurate as of: 4/27/17  4:11 PM.  Always use your most recent med list.                   Brand Name Dispense Instructions for use    sertraline 100 MG tablet    ZOLOFT    30 tablet    TAKE ONE TABLET BY MOUTH EVERY DAY -DUE FOR PHQ9 BEFORE FURTHER REFILLS         "

## 2017-06-20 ENCOUNTER — TELEPHONE (OUTPATIENT)
Dept: FAMILY MEDICINE | Facility: CLINIC | Age: 52
End: 2017-06-20

## 2017-06-20 DIAGNOSIS — F41.9 ANXIETY: ICD-10-CM

## 2017-06-20 NOTE — TELEPHONE ENCOUNTER
Reason for Call:  Other prescription    Detailed comments: Patient would like a call back regarding PHQ9 for Sertraline, would like to discuss over the phone.     Phone Number Patient can be reached at: Work number on file:  [phone] (work)    Best Time: any    Can we leave a detailed message on this number? YES    Call taken on 6/20/2017 at 2:06 PM by Kaitlin Jenkins

## 2017-06-21 RX ORDER — SERTRALINE HYDROCHLORIDE 100 MG/1
TABLET, FILM COATED ORAL
Qty: 30 TABLET | Refills: 6 | Status: SHIPPED | OUTPATIENT
Start: 2017-06-21 | End: 2019-02-08

## 2017-06-21 NOTE — TELEPHONE ENCOUNTER
"Sertraline- taking for anxiety, not depression. Does not have depression listed on problem list Did not need PHQ9   Last Written Prescription Date: 3/21/17  Last Fill Quantity: 30, # refills: 6  Last Office Visit [facility]:  3/21/17        Last PHQ-9 score on record=   PHQ-9 SCORE 6/21/2017   Total Score -   Total Score 1     Resent prescription and took out the pat sig \"DUE FOR PHQ9 BEFORE FURTHER REFILLS\"  Waleska Arciniega RN    "

## 2017-06-21 NOTE — TELEPHONE ENCOUNTER
Spoke to patient let her know that we have to do a health questioneer for future RX. Patient did PHQ9. Routing back to KRYSTLE Martinez. MA

## 2017-06-22 ASSESSMENT — PATIENT HEALTH QUESTIONNAIRE - PHQ9: SUM OF ALL RESPONSES TO PHQ QUESTIONS 1-9: 1

## 2017-09-01 DIAGNOSIS — B18.2 CHRONIC HEPATITIS C WITHOUT HEPATIC COMA (H): Primary | ICD-10-CM

## 2017-09-18 ENCOUNTER — OFFICE VISIT (OUTPATIENT)
Dept: GASTROENTEROLOGY | Facility: CLINIC | Age: 52
End: 2017-09-18
Attending: INTERNAL MEDICINE
Payer: COMMERCIAL

## 2017-09-18 VITALS
WEIGHT: 236.5 LBS | HEART RATE: 62 BPM | DIASTOLIC BLOOD PRESSURE: 82 MMHG | HEIGHT: 69 IN | BODY MASS INDEX: 35.03 KG/M2 | TEMPERATURE: 98 F | SYSTOLIC BLOOD PRESSURE: 135 MMHG

## 2017-09-18 DIAGNOSIS — B18.2 CHRONIC HEPATITIS C WITHOUT HEPATIC COMA (H): Primary | ICD-10-CM

## 2017-09-18 DIAGNOSIS — B18.2 CHRONIC HEPATITIS C WITHOUT HEPATIC COMA (H): ICD-10-CM

## 2017-09-18 LAB
ALBUMIN SERPL-MCNC: 3.7 G/DL (ref 3.4–5)
ALP SERPL-CCNC: 74 U/L (ref 40–150)
ALT SERPL W P-5'-P-CCNC: 48 U/L (ref 0–50)
ANION GAP SERPL CALCULATED.3IONS-SCNC: 5 MMOL/L (ref 3–14)
AST SERPL W P-5'-P-CCNC: 44 U/L (ref 0–45)
BILIRUB DIRECT SERPL-MCNC: 0.1 MG/DL (ref 0–0.2)
BILIRUB SERPL-MCNC: 0.4 MG/DL (ref 0.2–1.3)
BUN SERPL-MCNC: 17 MG/DL (ref 7–30)
CALCIUM SERPL-MCNC: 8.7 MG/DL (ref 8.5–10.1)
CHLORIDE SERPL-SCNC: 106 MMOL/L (ref 94–109)
CO2 SERPL-SCNC: 27 MMOL/L (ref 20–32)
CREAT SERPL-MCNC: 0.75 MG/DL (ref 0.52–1.04)
ERYTHROCYTE [DISTWIDTH] IN BLOOD BY AUTOMATED COUNT: 12.3 % (ref 10–15)
GFR SERPL CREATININE-BSD FRML MDRD: 81 ML/MIN/1.7M2
GLUCOSE SERPL-MCNC: 84 MG/DL (ref 70–99)
HCT VFR BLD AUTO: 48.1 % (ref 35–47)
HGB BLD-MCNC: 16 G/DL (ref 11.7–15.7)
INR PPP: 1.03 (ref 0.86–1.14)
MCH RBC QN AUTO: 31.3 PG (ref 26.5–33)
MCHC RBC AUTO-ENTMCNC: 33.3 G/DL (ref 31.5–36.5)
MCV RBC AUTO: 94 FL (ref 78–100)
PLATELET # BLD AUTO: 220 10E9/L (ref 150–450)
POTASSIUM SERPL-SCNC: 4.1 MMOL/L (ref 3.4–5.3)
PROT SERPL-MCNC: 8 G/DL (ref 6.8–8.8)
RBC # BLD AUTO: 5.11 10E12/L (ref 3.8–5.2)
SODIUM SERPL-SCNC: 137 MMOL/L (ref 133–144)
WBC # BLD AUTO: 7.4 10E9/L (ref 4–11)

## 2017-09-18 PROCEDURE — 85610 PROTHROMBIN TIME: CPT | Performed by: INTERNAL MEDICINE

## 2017-09-18 PROCEDURE — 36415 COLL VENOUS BLD VENIPUNCTURE: CPT | Performed by: INTERNAL MEDICINE

## 2017-09-18 PROCEDURE — 80048 BASIC METABOLIC PNL TOTAL CA: CPT | Performed by: INTERNAL MEDICINE

## 2017-09-18 PROCEDURE — 99212 OFFICE O/P EST SF 10 MIN: CPT | Mod: ZF

## 2017-09-18 PROCEDURE — 85027 COMPLETE CBC AUTOMATED: CPT | Performed by: INTERNAL MEDICINE

## 2017-09-18 PROCEDURE — 80076 HEPATIC FUNCTION PANEL: CPT | Performed by: INTERNAL MEDICINE

## 2017-09-18 ASSESSMENT — PAIN SCALES - GENERAL: PAINLEVEL: NO PAIN (0)

## 2017-09-18 NOTE — LETTER
9/18/2017       RE: Eli Burton  [address]  [facility] [address]     Dear Colleague,    Thank you for referring your patient, Eli Burton, to the [facility]PAT[facility] at [facility]. Please see a copy of my visit note below.    Hepatology Clinic note  Eli Burton   Date of Birth 1965  Date of Service 9/18/2017         Impression/plan:   Eli Burton is a 52 year old female with history of chronic hepatitis C genotype 1B, treatment na?ve with F-0 fibrosis on fibrosis scan.  She presents for followup.      Overall, she is doing well and appears to be a good candidate for hepatitis C treatment and eradication.      Her liver enzymes are minimally elevated and her synthetic function is intact.      Based on the above profile she would likely qualify for 8 weeks of Harvoni.  If she completes his treatment successfully and eradicates the virus, no further hepatology followup would be required.      I congratulated her on maintaining sobriety and encouraged long-term abstinence.  She was receptive and happy with the results she has achieved thus far.       Elliot Garg MD  [facility] Hepatology clinic    -----------------------------------------------------       HPI:   Eli Burton is a 52 year old female with chronic hepatitis C, genotype 1B, with F-0 fibrosis, treatment naive, who presents for followup.  Previously seen by Leeroy Bowers.      Since last visit, she has maintained sobriety as alcohol misuse was an issue in the past.  She notes to be doing well and has no acute problems.  She denies chest pain, shortness of breath, nausea, vomiting, fever, chills, fatigue, jaundice, bleeding, confusion, falls, rash, pruritus, leg swelling or abdominal distention.          Past Medical History:     Past Medical History:   Diagnosis Date     Anxiety      CTS (carpal tunnel syndrome) 7/20/2014     Depression       "Headache      Hyperlipidemia LDL goal <130             Past Surgical History:     Past Surgical History:   Procedure Laterality Date     PARATHYROIDECTOMY N/A 12/23/2014    Procedure: PARATHYROIDECTOMY;  Surgeon: Anusha Chan MD;  Location:  OR            Medications:     Current Outpatient Prescriptions   Medication     sertraline (ZOLOFT) 100 MG tablet     No current facility-administered medications for this visit.             Allergies:   No Known Allergies         Social History:     Social History     Social History     Marital status:      Spouse name: N/A     Number of children: 0     Years of education: N/A     Occupational History     Dental   [employer]     Social History Main Topics     Smoking status: Current Every Day Smoker     Packs/day: 0.50     Types: Cigarettes     Smokeless tobacco: Never Used     Alcohol use 0.0 oz/week     0 Standard drinks or equivalent per week      Comment: 0-10 light beers weekly     Drug use: Yes     Special: Marijuana     Sexual activity: Not Currently     Partners: Male     Other Topics Concern     Not on file     Social History Narrative            Family History:     Family History   Problem Relation Age of Onset     Breast Cancer Maternal Grandmother      Cancer - colorectal Maternal Grandfather      CEREBROVASCULAR DISEASE No family hx of               Review of Systems:   10 points ROS was obtained and highlighted in the HPI, otherwise negative.          Physical Exam:   VS:  /82  Pulse 62  Temp 98  F (36.7  C) (Oral)  Ht 1.753 m (5' 9\")  Wt 107.3 kg (236 lb 8 oz)  BMI 34.92 kg/m2      Gen: A&Ox3, NAD  HEENT: non-icteric, oropharynx clear  CV: RRR  Lung: CTAB  Abd: soft, NT, ND   Ext: no edema, intact pulses.   Skin: No stigmata of chronic liver disease.   Neuro: no focal deficits, grossly intact, no asterixis   Psych: appropriate mood and affects         Data:   Reviewed in person and significant for:  Lab " Results   Component Value Date    WBC 7.4 09/18/2017     Lab Results   Component Value Date    RBC 5.11 09/18/2017     Lab Results   Component Value Date    HGB 16.0 09/18/2017     Lab Results   Component Value Date    HCT 48.1 09/18/2017     No components found for: MCT  Lab Results   Component Value Date    MCV 94 09/18/2017     Lab Results   Component Value Date    MCH 31.3 09/18/2017     Lab Results   Component Value Date    MCHC 33.3 09/18/2017     Lab Results   Component Value Date    RDW 12.3 09/18/2017     Lab Results   Component Value Date     09/18/2017     Last Basic Metabolic Panel:  Lab Results   Component Value Date     09/18/2017      Lab Results   Component Value Date    POTASSIUM 4.1 09/18/2017     Lab Results   Component Value Date    CHLORIDE 106 09/18/2017     Lab Results   Component Value Date    NILO 8.7 09/18/2017     Lab Results   Component Value Date    CO2 27 09/18/2017     Lab Results   Component Value Date    BUN 17 09/18/2017     Lab Results   Component Value Date    CR 0.75 09/18/2017     Lab Results   Component Value Date    GLC 84 09/18/2017     Liver Function Studies -   Recent Labs   Lab Test  09/18/17   1549   PROTTOTAL  8.0   ALBUMIN  3.7   BILITOTAL  0.4   ALKPHOS  74   AST  44   ALT  48       Sincerely,    Elliot Garg MD

## 2017-09-18 NOTE — NURSING NOTE
"Chief Complaint   Patient presents with     RECHECK     5 month follow up Hepatitis C       Initial /82  Pulse 62  Temp 98  F (36.7  C) (Oral)  Ht 1.753 m (5' 9\")  Wt 107.3 kg (236 lb 8 oz)  BMI 34.92 kg/m2 Estimated body mass index is 34.92 kg/(m^2) as calculated from the following:    Height as of this encounter: 1.753 m (5' 9\").    Weight as of this encounter: 107.3 kg (236 lb 8 oz).  Medication Reconciliation: complete  "

## 2017-09-18 NOTE — MR AVS SNAPSHOT
"              After Visit Summary   2017    Eli Burton    MRN: [id number]           Patient Information     Date Of Birth          1965        Visit Information        Provider Department      2017 4:20 PM Elliot Garg MD [facility]        Today's Diagnoses     Chronic hepatitis C without hepatic coma (H)    -  1       Follow-ups after your visit        Who to contact     If you have questions or need follow up information about today's clinic visit or your schedule please contact [facility]PAT[facility] directly at [phone].  Normal or non-critical lab and imaging results will be communicated to you by Chalet Techhart, letter or phone within 4 business days after the clinic has received the results. If you do not hear from us within 7 days, please contact the clinic through GO Outdoorst or phone. If you have a critical or abnormal lab result, we will notify you by phone as soon as possible.  Submit refill requests through TVtrip or call your pharmacy and they will forward the refill request to us. Please allow 3 business days for your refill to be completed.          Additional Information About Your Visit        MyChart Information     TVtrip lets you send messages to your doctor, view your test results, renew your prescriptions, schedule appointments and more. To sign up, go to [email or web address] . Click on \"Log in\" on the left side of the screen, which will take you to the Welcome page. Then click on \"Sign up Now\" on the right side of the page.     You will be asked to enter the access code listed below, as well as some personal information. Please follow the directions to create your username and password.     Your access code is: [email or web address]  Expires: 12/3/2017  6:30 AM     Your access code will  in 90 days. If you need help or a new code, please call your [facility] clinic or [phone].        Care EveryWhere ID     This is your Care EveryWhere ID. This could be used by " "other organizations to access your [facility] medical records  [id number]        Your Vitals Were     Pulse Temperature Height BMI (Body Mass Index)          62 98  F (36.7  C) (Oral) 1.753 m (5' 9\") 34.92 kg/m2         Blood Pressure from Last 3 Encounters:   09/18/17 135/82   04/27/17 133/86   03/29/17 (!) 140/94    Weight from Last 3 Encounters:   09/18/17 107.3 kg (236 lb 8 oz)   04/27/17 109.3 kg (241 lb)   03/29/17 108.9 kg (240 lb)              Today, you had the following     No orders found for display         Today's Medication Changes          These changes are accurate as of: 9/18/17 11:59 PM.  If you have any questions, ask your nurse or doctor.               Start taking these medicines.        Dose/Directions    ledipasvir-sofosbuvir  MG per tablet   Commonly known as:  HARVONI   Used for:  Chronic hepatitis C without hepatic coma (H)   Started by:  Elliot Garg MD        Dose:  1 tablet   Take 1 tablet by mouth daily   Quantity:  28 tablet   Refills:  1            Where to get your medicines      These medications were sent to [facility] - [address]  [address] [facility] [address]    Hours:  Mon-Fri 8:30am-5:00pm Toll Free [phone] Phone:  [phone]     ledipasvir-sofosbuvir  MG per tablet                Primary Care Provider Office Phone # Fax #    Maricarmen Stephens [phone] [address] [facility] [address]        Equal Access to Services     VINAY FARAH AH: Hadii gustavo Campbell, carolyne dasilva, qayann goncalves. So [facility] [phone].    ATENCIÓN: Si habla español, tiene a castro disposición servicios gratuitos de asistencia lingüística. Llame al [phone].    We comply with applicable federal civil rights laws and [place] laws. We do not discriminate on the basis of race, color, national origin, age, disability, sex, " sexual orientation, or gender identity.            Thank you!     Thank you for choosing [facility]PATOLOG[facility]  for your care. Our goal is always to provide you with excellent care. Hearing back from our patients is one way we can continue to improve our services. Please take a few minutes to complete the written survey that you may receive in the mail after your visit with us. Thank you!             Your Updated Medication List - Protect others around you: Learn how to safely use, store and throw away your medicines at www.disposemymeds.org.          This list is accurate as of: 9/18/17 11:59 PM.  Always use your most recent med list.                   Brand Name Dispense Instructions for use Diagnosis    ledipasvir-sofosbuvir  MG per tablet    HARVONI    28 tablet    Take 1 tablet by mouth daily    Chronic hepatitis C without hepatic coma (H)       sertraline 100 MG tablet    ZOLOFT    30 tablet    TAKE ONE TABLET BY MOUTH EVERY DAY    Anxiety

## 2017-09-18 NOTE — PROGRESS NOTES
Hepatology Clinic note  Eli Burton   Date of Birth 1965  Date of Service 9/18/2017         Impression/plan:   Eli Burton is a 52 year old female with history of chronic hepatitis C genotype 1B, treatment na?ve with F-0 fibrosis on fibrosis scan.  She presents for followup.      Overall, she is doing well and appears to be a good candidate for hepatitis C treatment and eradication.      Her liver enzymes are minimally elevated and her synthetic function is intact.      Based on the above profile she would likely qualify for 8 weeks of Harvoni.  If she completes his treatment successfully and eradicates the virus, no further hepatology followup would be required.      I congratulated her on maintaining sobriety and encouraged long-term abstinence.  She was receptive and happy with the results she has achieved thus far.       Elliot Garg MD  [facility] Hepatology clinic    -----------------------------------------------------       HPI:   Eli Burton is a 52 year old female with chronic hepatitis C, genotype 1B, with F-0 fibrosis, treatment naive, who presents for followup.  Previously seen by Leeroy Bowers.      Since last visit, she has maintained sobriety as alcohol misuse was an issue in the past.  She notes to be doing well and has no acute problems.  She denies chest pain, shortness of breath, nausea, vomiting, fever, chills, fatigue, jaundice, bleeding, confusion, falls, rash, pruritus, leg swelling or abdominal distention.          Past Medical History:     Past Medical History:   Diagnosis Date     Anxiety      CTS (carpal tunnel syndrome) 7/20/2014     Depression      Headache      Hyperlipidemia LDL goal <130             Past Surgical History:     Past Surgical History:   Procedure Laterality Date     PARATHYROIDECTOMY N/A 12/23/2014    Procedure: PARATHYROIDECTOMY;  Surgeon: Anusha Chan MD;  Location:  OR            Medications:     Current  "Outpatient Prescriptions   Medication     sertraline (ZOLOFT) 100 MG tablet     No current facility-administered medications for this visit.             Allergies:   No Known Allergies         Social History:     Social History     Social History     Marital status:      Spouse name: N/A     Number of children: 0     Years of education: N/A     Occupational History     Dental   [employer]     Social History Main Topics     Smoking status: Current Every Day Smoker     Packs/day: 0.50     Types: Cigarettes     Smokeless tobacco: Never Used     Alcohol use 0.0 oz/week     0 Standard drinks or equivalent per week      Comment: 0-10 light beers weekly     Drug use: Yes     Special: Marijuana     Sexual activity: Not Currently     Partners: Male     Other Topics Concern     Not on file     Social History Narrative            Family History:     Family History   Problem Relation Age of Onset     Breast Cancer Maternal Grandmother      Cancer - colorectal Maternal Grandfather      CEREBROVASCULAR DISEASE No family hx of               Review of Systems:   10 points ROS was obtained and highlighted in the HPI, otherwise negative.          Physical Exam:   VS:  /82  Pulse 62  Temp 98  F (36.7  C) (Oral)  Ht 1.753 m (5' 9\")  Wt 107.3 kg (236 lb 8 oz)  BMI 34.92 kg/m2      Gen: A&Ox3, NAD  HEENT: non-icteric, oropharynx clear  CV: RRR  Lung: CTAB  Abd: soft, NT, ND   Ext: no edema, intact pulses.   Skin: No stigmata of chronic liver disease.   Neuro: no focal deficits, grossly intact, no asterixis   Psych: appropriate mood and affects         Data:   Reviewed in person and significant for:  Lab Results   Component Value Date    WBC 7.4 09/18/2017     Lab Results   Component Value Date    RBC 5.11 09/18/2017     Lab Results   Component Value Date    HGB 16.0 09/18/2017     Lab Results   Component Value Date    HCT 48.1 09/18/2017     No components found for: MCT  Lab Results   Component " Value Date    MCV 94 09/18/2017     Lab Results   Component Value Date    MCH 31.3 09/18/2017     Lab Results   Component Value Date    MCHC 33.3 09/18/2017     Lab Results   Component Value Date    RDW 12.3 09/18/2017     Lab Results   Component Value Date     09/18/2017     Last Basic Metabolic Panel:  Lab Results   Component Value Date     09/18/2017      Lab Results   Component Value Date    POTASSIUM 4.1 09/18/2017     Lab Results   Component Value Date    CHLORIDE 106 09/18/2017     Lab Results   Component Value Date    NILO 8.7 09/18/2017     Lab Results   Component Value Date    CO2 27 09/18/2017     Lab Results   Component Value Date    BUN 17 09/18/2017     Lab Results   Component Value Date    CR 0.75 09/18/2017     Lab Results   Component Value Date    GLC 84 09/18/2017     Liver Function Studies -   Recent Labs   Lab Test  09/18/17   1549   PROTTOTAL  8.0   ALBUMIN  3.7   BILITOTAL  0.4   ALKPHOS  74   AST  44   ALT  48

## 2017-09-25 ENCOUNTER — TELEPHONE (OUTPATIENT)
Dept: GASTROENTEROLOGY | Facility: CLINIC | Age: 52
End: 2017-09-25

## 2017-09-25 NOTE — TELEPHONE ENCOUNTER
Pt called with questions about Pili and the insurance authorization process. Please callback Pt at [phone] or work [phone] (do not leave message).

## 2017-09-28 NOTE — TELEPHONE ENCOUNTER
Pt called back. She is not sure about nature of situation besides Dr. Garg saying Rx would be submitted to pt's insurance. Please advise at [phone].

## 2017-09-29 NOTE — TELEPHONE ENCOUNTER
9/29/2017  4:31 PM      Hep C Care Coordination Call   Connected with patient for follow up on Hep C treatment status. I updated patient that once Dr. Garg prescribes the medication of Harvoni x8 weeks as discussed in clinic; insurance can take up to 30 days or longer for approval. I did recommend for patient to contact [facility] with an update on approval status in 2-3 weeks; direct number to [facility] given. Patient understands POC and has no further questions at this time. Patient is aware she can contact this RN CC directly at [phone].         Chanelle Burciaga RN, BSN, PHN  [facility]   RN Care Coordinator Hepatology Specialty Clinic/Program

## 2017-10-05 RX ORDER — LEDIPASVIR AND SOFOSBUVIR 90; 400 MG/1; MG/1
1 TABLET, FILM COATED ORAL DAILY
Qty: 28 TABLET | Refills: 1 | Status: SHIPPED | OUTPATIENT
Start: 2017-10-05 | End: 2019-02-08

## 2017-10-11 ENCOUNTER — TELEPHONE (OUTPATIENT)
Dept: GASTROENTEROLOGY | Facility: CLINIC | Age: 52
End: 2017-10-11

## 2017-10-11 NOTE — TELEPHONE ENCOUNTER
PA Initiation    Medication: Harvoni  Insurance Company: [employer] [place] - Phone [phone] Fax [phone]  Pharmacy Filling the Rx: [facility] - [place], [place] - [facility] [address]  Filling Pharmacy Phone: [phone]  Filling Pharmacy Fax: [phone]  Start Date: 10/11/2017

## 2017-10-17 ENCOUNTER — TELEPHONE (OUTPATIENT)
Dept: GASTROENTEROLOGY | Facility: CLINIC | Age: 52
End: 2017-10-17

## 2017-10-17 DIAGNOSIS — B18.2 CHRONIC HEPATITIS C WITHOUT HEPATIC COMA (H): ICD-10-CM

## 2017-10-17 DIAGNOSIS — B18.2 CHRONIC HEPATITIS C WITHOUT HEPATIC COMA (H): Primary | ICD-10-CM

## 2017-10-17 PROCEDURE — 36415 COLL VENOUS BLD VENIPUNCTURE: CPT | Performed by: INTERNAL MEDICINE

## 2017-10-17 PROCEDURE — 86706 HEP B SURFACE ANTIBODY: CPT | Performed by: INTERNAL MEDICINE

## 2017-10-17 PROCEDURE — 87340 HEPATITIS B SURFACE AG IA: CPT | Performed by: INTERNAL MEDICINE

## 2017-10-17 PROCEDURE — 86704 HEP B CORE ANTIBODY TOTAL: CPT | Performed by: INTERNAL MEDICINE

## 2017-10-17 NOTE — TELEPHONE ENCOUNTER
Instructed pt the need to have additional hep B labs for Harvoni approval. Pt will have blood drawn @ [facility].

## 2017-10-17 NOTE — TELEPHONE ENCOUNTER
PRIOR AUTHORIZATION DENIED    Medication: Pili    Denial Date: 10/17/2017    Denial Rational: Plan is requiring all 3 HBV labs for approval.    Appeal Information: Can we please have these ordered? Or has patient received at another location? Please advise, thank you.

## 2017-10-18 LAB
HBV CORE AB SERPL QL IA: NONREACTIVE
HBV SURFACE AB SERPL IA-ACNC: 0.46 M[IU]/ML
HBV SURFACE AG SERPL QL IA: NONREACTIVE

## 2017-10-30 NOTE — TELEPHONE ENCOUNTER
Prior Authorization Approval    Authorization Effective Date: 10/6/2017  Authorization Expiration Date: 12/29/2017  Medication: Harvoni  Approved Dose/Quantity: 8 weeks  Reference #: [id number]   Insurance Company: [employer] [place] - Phone [phone] Fax [phone]  Expected CoPay: $1167.79     CoPay Card Available: Yes   Foundation Assistance Needed: No  Which Pharmacy is filling the prescription (Not needed for infusion/clinic administered): [facility] - [place], [place] - [address]

## 2017-11-21 ENCOUNTER — CARE COORDINATION (OUTPATIENT)
Dept: GASTROENTEROLOGY | Facility: CLINIC | Age: 52
End: 2017-11-21

## 2017-11-21 DIAGNOSIS — B18.2 CHRONIC HEPATITIS C WITHOUT HEPATIC COMA (H): Primary | ICD-10-CM

## 2017-11-21 NOTE — PROGRESS NOTES
11/21/2017  7:53 AM      Hep C Care Coordination Call   Attempted to connect with patient for follow up on Hep C treatment POC. No answer. Patient did leave this RN CC a message that she started her Harvoni which she will be taking for x8 weeks on 11/2/17. I will base the treatment POC on this start date and send the treatment plan in the mail to patient.     Below is a summary of your treatment schedule. Please follow the schedule as closely as possible. Labs should be drawn as close to the date indicated at the [facility] or [facility].    Hepatitis C Treatment  Treatment: Harvoni x 8 weeks  Genotype: 1b  Stage Fibrosis: F0  Treatment Naive       Start Date: 11/02/17    Week 4  Hepatic panel, BMP Lab Due: 11/30/2017. Please have these labs drawn on this date or within a week after. You do not need to fast for these labs.       Week 8 - End of Treatment  HCV RNA Quant Lab Due: Please have this lab drawn on or within a week after this date 12/28/17. You will need to repeat this lab 3 months after completing treatment to ensure you have cleared the virus. Please see date for the final lab below. You do not need to fast for this lab.     3 Months Post Treatment  HCV RNA Quant Lab Due: Please have this lab drawn on the specified date of 3/28/18 or within a week after. This final lab will determine if you have cleared the Hepatitis C virus with Harvoni treatment. Without his final lab, we will be unable to determine if treatment was successful. You do not need to fast for this lab.     Educational information to patient on Hep C treatment;     -Contact the [facility] Hepatology clinic and speak with RN Care Coordinator prior to starting any new prescribed or OTC medications.   -Take medications exactly as prescribed, do not change dose or stop taking without consulting your provider.   -Take Medication one time each day with or without food  -If you miss a dose of medication, then take it as soon as  you remember on the same day. If not remembered on the same day, then skip the dose and take the next dose at the usual time. Do not take more than the recommended dose. Contact the clinic if you miss a dose.    Please contact the pharmacy 1-2 weeks prior to needing a medication refill.      Side Effects  The most common side effects of Hep C medication treatment can include:  -tiredness  -headache  Notify the clinic of any side effects that bother you or that do not go away.   Possible side effects have been discussed.   Patient has been instructed to clinic for rash, itching or unmanageable nausea.    How to store Hep C Treatment Medications  -Store Medication at room temperature below 86 degrees F  -Keep Medication in it's original container  -Do not use Medication if the seal is broken or missing    General information  It is not known if treatment will prevent you from infecting another person or reinfecting yourself with the hepatitis C virus during treatment. It is best that as soon as you start treatment to buy a new toothbrush, disposable razors (if you use a rotating shaver you do not need to buy a new one) and nail clippers. If you check your blood sugar at home, please dispose of the fingerstick needle after each use and DO NOT REUSE the insulin needles. These items should not be shared with anyone.        If you have any questions, please contact the main clinic at [phone] or your RN Care Coordinator at [phone]. We appreciate you choosing the [facility] clinic for your treatment. Patient will receive a copy of treatment plan in the mail, address verified in Epic. Hep C care team updated on patient status.          Chanelle Burciaga RN, BSN, PHN  [facility]  Care Coordinator for Hepatology Clinic/Specialty Program

## 2017-11-21 NOTE — LETTER
November 21, 2017       TO: Eli Burton  [address]  [place] [address]       Dear MsMarli Mckeonie,    Below is a summary of your treatment schedule. Please follow the schedule as closely as possible. Labs should be drawn as close to the date indicated at the [facility] or [facility].    Hepatitis C Treatment  Treatment: Harvoni x 8 weeks    Start Date: 11/02/17    Week 4  Hepatic panel, BMP Lab Due: 11/30/2017. Please have these labs drawn on this date or within a week after. You do not need to fast for these labs.       Week 8 - End of Treatment  HCV RNA Quant Lab Due: Please have this lab drawn on or within a week after this date 12/28/17. You will need to repeat this lab 3 months after completing treatment to ensure you have cleared the virus. Please see date for the final lab below. You do not need to fast for this lab.     3 Months Post Treatment  HCV RNA Quant Lab Due: Please have this lab drawn on the specified date of 3/28/18 or within a week after. This final lab will determine if you have cleared the Hepatitis C virus with Harvoni treatment. Without his final lab, we will be unable to determine if treatment was successful. You do not need to fast for this lab.                 Educational information to patient on Hep C treatment;     -Contact the [facility] Hepatology clinic and speak with RN Care Coordinator prior to starting any new prescribed or OTC medications.   -Take medications exactly as prescribed, do not change dose or stop taking without consulting your provider.   -Take Medication one time each day with or without food  -If you miss a dose of medication, then take it as soon as you remember on the same day. If not remembered on the same day, then skip the dose and take the next dose at the usual time. Do not take more than the recommended dose. Contact the clinic if you miss a dose.    Please contact the pharmacy 1-2 weeks prior to needing a medication  refill.      Side Effects  The most common side effects of Hep C medication treatment can include:  -tiredness  -headache  Notify the clinic of any side effects that bother you or that do not go away.   Possible side effects have been discussed.   Patient has been instructed to clinic for rash, itching or unmanageable nausea.    How to store Hep C Treatment Medications  -Store Medication at room temperature below 86 degrees F  -Keep Medication in it's original container  -Do not use Medication if the seal is broken or missing    General information  It is not known if treatment will prevent you from infecting another person or reinfecting yourself with the hepatitis C virus during treatment. It is best that as soon as you start treatment to buy a new toothbrush, disposable razors (if you use a rotating shaver you do not need to buy a new one) and nail clippers. If you check your blood sugar at home, please dispose of the fingerstick needle after each use and DO NOT REUSE the insulin needles. These items should not be shared with anyone.        If you have any questions, please contact the main clinic at [phone] or your RN Care Coordinator at [phone]. We appreciate you choosing the [facility] clinic for your treatment.               Chanelle Burciaga RN, BSN, PHN  [facility]  Care Coordinator for Hepatology Clinic/Specialty Program

## 2017-11-30 DIAGNOSIS — B18.2 CHRONIC HEPATITIS C WITHOUT HEPATIC COMA (H): ICD-10-CM

## 2017-11-30 PROCEDURE — 36415 COLL VENOUS BLD VENIPUNCTURE: CPT | Performed by: INTERNAL MEDICINE

## 2017-11-30 PROCEDURE — 80076 HEPATIC FUNCTION PANEL: CPT | Performed by: INTERNAL MEDICINE

## 2017-11-30 PROCEDURE — 80048 BASIC METABOLIC PNL TOTAL CA: CPT | Performed by: INTERNAL MEDICINE

## 2017-12-01 LAB
ALBUMIN SERPL-MCNC: 3.6 G/DL (ref 3.4–5)
ALP SERPL-CCNC: 67 U/L (ref 40–150)
ALT SERPL W P-5'-P-CCNC: 16 U/L (ref 0–50)
ANION GAP SERPL CALCULATED.3IONS-SCNC: 8 MMOL/L (ref 3–14)
AST SERPL W P-5'-P-CCNC: 16 U/L (ref 0–45)
BILIRUB DIRECT SERPL-MCNC: <0.1 MG/DL (ref 0–0.2)
BILIRUB SERPL-MCNC: 0.5 MG/DL (ref 0.2–1.3)
BUN SERPL-MCNC: 14 MG/DL (ref 7–30)
CALCIUM SERPL-MCNC: 8.7 MG/DL (ref 8.5–10.1)
CHLORIDE SERPL-SCNC: 103 MMOL/L (ref 94–109)
CO2 SERPL-SCNC: 28 MMOL/L (ref 20–32)
CREAT SERPL-MCNC: 0.73 MG/DL (ref 0.52–1.04)
GFR SERPL CREATININE-BSD FRML MDRD: 83 ML/MIN/1.7M2
GLUCOSE SERPL-MCNC: 76 MG/DL (ref 70–99)
POTASSIUM SERPL-SCNC: 3.8 MMOL/L (ref 3.4–5.3)
PROT SERPL-MCNC: 7.6 G/DL (ref 6.8–8.8)
SODIUM SERPL-SCNC: 139 MMOL/L (ref 133–144)

## 2017-12-17 ENCOUNTER — HEALTH MAINTENANCE LETTER (OUTPATIENT)
Age: 52
End: 2017-12-17

## 2017-12-28 DIAGNOSIS — B18.2 CHRONIC HEPATITIS C WITHOUT HEPATIC COMA (H): ICD-10-CM

## 2017-12-28 PROCEDURE — 87522 HEPATITIS C REVRS TRNSCRPJ: CPT | Performed by: INTERNAL MEDICINE

## 2017-12-28 PROCEDURE — 36415 COLL VENOUS BLD VENIPUNCTURE: CPT | Performed by: INTERNAL MEDICINE

## 2018-01-01 LAB
HCV RNA SERPL NAA+PROBE-ACNC: NORMAL [IU]/ML
HCV RNA SERPL NAA+PROBE-LOG IU: NORMAL LOG IU/ML

## 2018-01-16 ENCOUNTER — TELEPHONE (OUTPATIENT)
Dept: GASTROENTEROLOGY | Facility: CLINIC | Age: 53
End: 2018-01-16

## 2018-01-16 NOTE — TELEPHONE ENCOUNTER
Writer clarified with pt that although the Hep C level came back negative for the virus, it is necessary to recheck the level in 3 months to confirm that the virus has been cleared. Pt understood.

## 2018-03-28 DIAGNOSIS — B18.2 CHRONIC HEPATITIS C WITHOUT HEPATIC COMA (H): ICD-10-CM

## 2018-03-28 PROCEDURE — 87522 HEPATITIS C REVRS TRNSCRPJ: CPT | Performed by: INTERNAL MEDICINE

## 2018-03-28 PROCEDURE — 36415 COLL VENOUS BLD VENIPUNCTURE: CPT | Performed by: INTERNAL MEDICINE

## 2018-03-29 LAB
HCV RNA SERPL NAA+PROBE-ACNC: NORMAL [IU]/ML
HCV RNA SERPL NAA+PROBE-LOG IU: NORMAL LOG IU/ML

## 2018-04-02 ENCOUNTER — TELEPHONE (OUTPATIENT)
Dept: GASTROENTEROLOGY | Facility: CLINIC | Age: 53
End: 2018-04-02

## 2018-04-02 NOTE — TELEPHONE ENCOUNTER
----- Message from Chanelle Burciaga RN sent at 4/2/2018  1:38 PM CDT -----  Regarding: Dr. Garg patient. cleared the Hep C virus   Gee Eid,       Patient completed her 3 month hep c lab and still shows undetected. Please update the patient.         Thank you,        Chanelle

## 2018-04-02 NOTE — TELEPHONE ENCOUNTER
Writer informed pt that she has cleared th virus and will no longer need to f/u in the liver clinic.

## 2018-04-26 ENCOUNTER — OFFICE VISIT (OUTPATIENT)
Dept: FAMILY MEDICINE | Facility: CLINIC | Age: 53
End: 2018-04-26
Payer: COMMERCIAL

## 2018-04-26 VITALS
DIASTOLIC BLOOD PRESSURE: 90 MMHG | SYSTOLIC BLOOD PRESSURE: 140 MMHG | WEIGHT: 238 LBS | BODY MASS INDEX: 36.07 KG/M2 | HEIGHT: 68 IN | OXYGEN SATURATION: 96 % | TEMPERATURE: 98.4 F | HEART RATE: 78 BPM | RESPIRATION RATE: 18 BRPM

## 2018-04-26 DIAGNOSIS — J06.9 VIRAL UPPER RESPIRATORY TRACT INFECTION: Primary | ICD-10-CM

## 2018-04-26 DIAGNOSIS — Z72.0 TOBACCO ABUSE: ICD-10-CM

## 2018-04-26 PROCEDURE — 99213 OFFICE O/P EST LOW 20 MIN: CPT | Performed by: FAMILY MEDICINE

## 2018-04-26 NOTE — PATIENT INSTRUCTIONS
[facility]    If you have any questions regarding to your visit please contact your care team:       Team Purple:   Clinic Hours Telephone Number   Dr. Mikaela Brower   7am-7pm  Monday - Thursday   7am-5pm  Fridays  [phone]  (Appointment scheduling available 24/7)    Questions about your recent visit?   Team Line:  [phone]   Urgent Care - [place] and [facility] - 11am-9pm Monday-Friday Saturday-Sunday- 9am-5pm   [place] - 5pm-9pm Monday-Friday Saturday-Sunday- 9am-5pm  [phone] - [place]  [phone] [facility]       What options do I have for a visit other than an office visit? We offer electronic visits (e-visits) and telephone visits, when medically appropriate.  Please check with your medical insurance to see if these types of visits are covered, as you will be responsible for any charges that are not paid by your insurance.      You can use [employer] (secure electronic communication) to access to your chart, send your primary care provider a message, or make an appointment. Ask a team member how to get started.     For a price quote for your services, please call our Consumer Price Line at [phone] or our Imaging Cost estimation line at [phone] (for imaging tests).

## 2018-04-26 NOTE — PROGRESS NOTES
SUBJECTIVE:   Eli Burton is a 52 year old female who presents to clinic today for the following health issues:      ENT Symptoms             Symptoms: cc Present Absent Comment   Fever/Chills   x Feels hot but no temp taken   Fatigue  x     Muscle Aches  x     Eye Irritation  x  Watery eyes and been rubbing eyes   Sneezing  x     Nasal Rob/Drg  x  Nasal Congested   Sinus Pressure/Pain  x     Loss of smell  x     Dental pain   x    Sore Throat  x     Swollen Glands  x     Ear Pain/Fullness  x  Having pain in both ears   Cough  x     Wheeze  x  slightly   Chest Pain  x  Had chest pain yesterday   Shortness of breath   x    Rash   x    Other   x      Symptom duration:  5 days   Symptom severity:  severe   Treatments tried:  Sudafed, Dayquil, Aleve-D Sinus and Cold, Cough &Chest Congestion DM, Cough Drops, increased fluids intake     Contacts:  none              Problem list and histories reviewed & adjusted, as indicated.  Additional history: as documented    Patient Active Problem List   Diagnosis     Tobacco abuse     Anxiety     CTS (carpal tunnel syndrome)     Trigger finger, acquired     S/P parathyroidectomy (H)     Hyperlipidemia LDL goal <130     Family history of colon cancer     Chronic hepatitis C without hepatic coma (H)     Past Surgical History:   Procedure Laterality Date     PARATHYROIDECTOMY N/A 12/23/2014    Procedure: PARATHYROIDECTOMY;  Surgeon: Anusha Chan MD;  Location:  OR       Social History   Substance Use Topics     Smoking status: Current Every Day Smoker     Packs/day: 0.50     Types: Cigarettes     Smokeless tobacco: Never Used     Alcohol use 0.0 oz/week     0 Standard drinks or equivalent per week      Comment: 0-10 light beers weekly     Family History   Problem Relation Age of Onset     Breast Cancer Maternal Grandmother      Cancer - colorectal Maternal Grandfather      Heart Surgery Sister      heart attack     CEREBROVASCULAR DISEASE No family hx of       "    Current Outpatient Prescriptions   Medication Sig Dispense Refill     sertraline (ZOLOFT) 100 MG tablet TAKE ONE TABLET BY MOUTH EVERY DAY (Patient taking differently: Take 50 mg by mouth daily TAKE ONE TABLET BY MOUTH EVERY DAY) 30 tablet 6     ledipasvir-sofosbuvir (HARVONI)  MG per tablet Take 1 tablet by mouth daily (Patient not taking: Reported on 4/26/2018) 28 tablet 1     No Known Allergies  BP Readings from Last 3 Encounters:   04/26/18 140/90   09/18/17 135/82   04/27/17 133/86    Wt Readings from Last 3 Encounters:   04/26/18 238 lb (108 kg)   09/18/17 236 lb 8 oz (107.3 kg)   04/27/17 241 lb (109.3 kg)                  Labs reviewed in EPIC    Reviewed and updated as needed this visit by clinical staff  Tobacco  Allergies  Meds  Med Hx  Surg Hx  Fam Hx  Soc Hx      Reviewed and updated as needed this visit by Provider         ROS:  This 52 year old female is here today because she has had a severe head cold for the past 5 days. 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 Very hard to work with nasal congestion and blowing her nose all the time. She is a smoker. Even smoking isn't enjoyable now. She has tried dayquil and nyquil. They just dry out her mouth. No fevers. All other review of systems are negative  Personal, family, and social history reviewed with patient and revised.         OBJECTIVE:     /90  Pulse 78  Temp 98.4  F (36.9  C) (Oral)  Resp 18  Ht 5' 7.84\" (1.723 m)  Wt 238 lb (108 kg)  SpO2 96%  BMI 36.36 kg/m2  Body mass index is 36.36 kg/(m^2).  GENERAL: healthy, alert and no distress  EYES: Eyes grossly normal to inspection, PERRL and conjunctivae and sclerae normal  HENT: ear canals and TM's normal, nose and mouth without ulcers or lesions. But she has very swollen nasal mucosa bilaterally. Hard for her to breath through her nose. Hard to talk. Her sinuses are not tender  NECK: no adenopathy, no asymmetry, masses, or scars and thyroid normal to " palpation  RESP: lungs clear to auscultation - no rales, rhonchi or wheezes  CV: regular rate and rhythm, normal S1 S2, no S3 or S4, no murmur, click or rub, no peripheral edema and peripheral pulses strong  MS: no gross musculoskeletal defects noted, no edema    Diagnostic Test Results:  none     ASSESSMENT/PLAN:              1. Viral upper respiratory tract infection  As above, recommend Afrin or Dristan nasal spray BID for only 3 days     2. Tobacco abuse  Encouraged to quit. This is a good time to quit       Return to clinic if no improvement     CRESENCIO YE MD  [facility]

## 2018-04-26 NOTE — MR AVS SNAPSHOT
After Visit Summary   4/26/2018    Eli Burton    MRN: [id number]           Patient Information     Date Of Birth          1965        Visit Information        Provider Department      4/26/2018 9:30 AM Mikaela Parekh MD [facility]        Today's Diagnoses     Viral upper respiratory tract infection    -  1    Tobacco abuse          Care Instructions    [facility]    If you have any questions regarding to your visit please contact your care team:       Team Purple:   Clinic Hours Telephone Number   Dr. Mikaela Brower   7am-7pm  Monday - Thursday   7am-5pm  Fridays  [phone]  (Appointment scheduling available 24/7)    Questions about your recent visit?   Team Line:  [phone]   Urgent Care - [place] and [facility] - 11am-9pm Monday-Friday Saturday-Sunday- 9am-5pm   [place] - 5pm-9pm Monday-Friday Saturday-Sunday- 9am-5pm  [phone] - [place]  [phone] [facility]       What options do I have for a visit other than an office visit? We offer electronic visits (e-visits) and telephone visits, when medically appropriate.  Please check with your medical insurance to see if these types of visits are covered, as you will be responsible for any charges that are not paid by your insurance.      You can use [employer] (secure electronic communication) to access to your chart, send your primary care provider a message, or make an appointment. Ask a team member how to get started.     For a price quote for your services, please call our Consumer Price Line at [phone] or our Imaging Cost estimation line at [phone] (for imaging tests).              Follow-ups after your visit        Who to contact     If you have questions or need follow up information about today's clinic visit or your schedule please contact [facility] directly at [phone].  Normal or non-critical  "lab and imaging results will be communicated to you by MyChart, letter or phone within 4 business days after the clinic has received the results. If you do not hear from us within 7 days, please contact the clinic through [employer] or phone. If you have a critical or abnormal lab result, we will notify you by phone as soon as possible.  Submit refill requests through [employer] or call your pharmacy and they will forward the refill request to us. Please allow 3 business days for your refill to be completed.          Additional Information About Your Visit        [employer] Information     [employer] lets you send messages to your doctor, view your test results, renew your prescriptions, schedule appointments and more. To sign up, go to [email or web address] . Click on \"Log in\" on the left side of the screen, which will take you to the Welcome page. Then click on \"Sign up Now\" on the right side of the page.     You will be asked to enter the access code listed below, as well as some personal information. Please follow the directions to create your username and password.     Your access code is: [email or web address]  Expires: 2018  9:47 AM     Your access code will  in 90 days. If you need help or a new code, please call your [place] clinic or [phone].        Care EveryWhere ID     This is your Care EveryWhere ID. This could be used by other organizations to access your [place] medical records  [id number]        Your Vitals Were     Pulse Temperature Respirations Height Pulse Oximetry BMI (Body Mass Index)    78 98.4  F (36.9  C) (Oral) 18 5' 7.84\" (1.723 m) 96% 36.36 kg/m2       Blood Pressure from Last 3 Encounters:   18 140/90   17 135/82   17 133/86    Weight from Last 3 Encounters:   18 238 lb (108 kg)   17 236 lb 8 oz (107.3 kg)   17 241 lb (109.3 kg)              Today, you had the following     No orders found for display         Today's Medication Changes          These " changes are accurate as of 4/26/18  9:49 AM.  If you have any questions, ask your nurse or doctor.               These medicines have changed or have updated prescriptions.        Dose/Directions    sertraline 100 MG tablet   Commonly known as:  ZOLOFT   This may have changed:    - how much to take  - how to take this  - when to take this  - additional instructions   Used for:  Anxiety        TAKE ONE TABLET BY MOUTH EVERY DAY   Quantity:  30 tablet   Refills:  6                Primary Care Provider Office Phone # Fax #    Maricarmen Stephens [phone] [address] [facility] [address]        Equal Access to Services     [facility]: Hadii gustavo Campbell, watriston dasilva, ainsley kaalmaadriana hair, yann roland . So [facility] [phone].    ATENCIÓN: Si habla español, tiene a castro disposición servicios gratuitos de asistencia lingüística. [facility] [phone].    We comply with applicable federal civil rights laws and [place] laws. We do not discriminate on the basis of race, color, national origin, age, disability, sex, sexual orientation, or gender identity.            Thank you!     Thank you for choosing [facility]  for your care. Our goal is always to provide you with excellent care. Hearing back from our patients is one way we can continue to improve our services. Please take a few minutes to complete the written survey that you may receive in the mail after your visit with us. Thank you!             Your Updated Medication List - Protect others around you: Learn how to safely use, store and throw away your medicines at www.disposemymeds.org.          This list is accurate as of 4/26/18  9:49 AM.  Always use your most recent med list.                   Brand Name Dispense Instructions for use Diagnosis    ledipasvir-sofosbuvir  MG per tablet    HARVONI    28 tablet    Take 1 tablet by mouth daily    Chronic hepatitis C without  hepatic coma (H)       sertraline 100 MG tablet    ZOLOFT    30 tablet    TAKE ONE TABLET BY MOUTH EVERY DAY    Anxiety

## 2018-09-18 NOTE — TELEPHONE ENCOUNTER
Bedside shift change report given to Марина SepulvedaRN (oncoming nurse) by Sunday GEOVANNI Coleman(offgoing nurse). Report given with SBAR. Faxed updated HBV labs into plan.

## 2019-02-04 ENCOUNTER — TELEPHONE (OUTPATIENT)
Dept: FAMILY MEDICINE | Facility: CLINIC | Age: 54
End: 2019-02-04

## 2019-02-04 NOTE — LETTER
February 4, 2019          Eli Burton,  [address]  [place] [address]        Dear Eli Burton      Monitoring and managing your preventative and chronic health conditions are very important to us. Our records indicate that you have not scheduled for Colonoscopy and FIT test  which was recommended by Dr. Castaneda.    If you have received your health care elsewhere, please call the clinic so the information can be documented in your chart.    Please call [phone] or message us through your [facility] account to schedule an appointment or provide information for your chart.     Feel free to contact us if you have any questions or concerns!    I look forward to seeing you and working with you on your health care needs.     Sincerely,       Your [place] Care Team/cc

## 2019-02-04 NOTE — TELEPHONE ENCOUNTER
Panel Management Review      Patient has the following on her problem list: None      Composite cancer screening  Chart review shows that this patient is due/due soon for the following Colonoscopy  Summary:    Patient is due/failing the following:   COLONOSCOPY, FIT and LDL    Action needed:   Patient needs referral/order: Colonoscopy and FIT     Type of outreach:    Sent letter.    Questions for provider review:    None                                                                                                                                    DOM Luevano       Chart routed to none .

## 2019-02-08 ENCOUNTER — OFFICE VISIT (OUTPATIENT)
Dept: FAMILY MEDICINE | Facility: CLINIC | Age: 54
End: 2019-02-08
Payer: COMMERCIAL

## 2019-02-08 VITALS
DIASTOLIC BLOOD PRESSURE: 86 MMHG | SYSTOLIC BLOOD PRESSURE: 132 MMHG | HEIGHT: 68 IN | TEMPERATURE: 98.1 F | WEIGHT: 242 LBS | HEART RATE: 90 BPM | BODY MASS INDEX: 36.68 KG/M2 | OXYGEN SATURATION: 98 %

## 2019-02-08 DIAGNOSIS — J01.90 ACUTE SINUSITIS WITH SYMPTOMS > 10 DAYS: ICD-10-CM

## 2019-02-08 DIAGNOSIS — Z00.00 ROUTINE GENERAL MEDICAL EXAMINATION AT A HEALTH CARE FACILITY: Primary | ICD-10-CM

## 2019-02-08 DIAGNOSIS — Z90.89 S/P PARATHYROIDECTOMY: ICD-10-CM

## 2019-02-08 DIAGNOSIS — Z98.890 S/P PARATHYROIDECTOMY: ICD-10-CM

## 2019-02-08 DIAGNOSIS — E78.5 HYPERLIPIDEMIA LDL GOAL <130: ICD-10-CM

## 2019-02-08 DIAGNOSIS — F41.9 ANXIETY: ICD-10-CM

## 2019-02-08 DIAGNOSIS — E66.01 MORBID OBESITY (H): ICD-10-CM

## 2019-02-08 DIAGNOSIS — Z12.11 SPECIAL SCREENING FOR MALIGNANT NEOPLASMS, COLON: ICD-10-CM

## 2019-02-08 DIAGNOSIS — F33.1 MODERATE EPISODE OF RECURRENT MAJOR DEPRESSIVE DISORDER (H): ICD-10-CM

## 2019-02-08 PROBLEM — Z86.19 HISTORY OF HEPATITIS C: Status: ACTIVE | Noted: 2017-03-29

## 2019-02-08 LAB
ALBUMIN SERPL-MCNC: 4.1 G/DL (ref 3.4–5)
ALP SERPL-CCNC: 64 U/L (ref 40–150)
ALT SERPL W P-5'-P-CCNC: 13 U/L (ref 0–50)
ANION GAP SERPL CALCULATED.3IONS-SCNC: 5 MMOL/L (ref 3–14)
AST SERPL W P-5'-P-CCNC: 17 U/L (ref 0–45)
BASOPHILS # BLD AUTO: 0.1 10E9/L (ref 0–0.2)
BASOPHILS NFR BLD AUTO: 0.7 %
BILIRUB SERPL-MCNC: 0.4 MG/DL (ref 0.2–1.3)
BUN SERPL-MCNC: 16 MG/DL (ref 7–30)
CALCIUM SERPL-MCNC: 8.9 MG/DL (ref 8.5–10.1)
CHLORIDE SERPL-SCNC: 107 MMOL/L (ref 94–109)
CHOLEST SERPL-MCNC: 218 MG/DL
CO2 SERPL-SCNC: 26 MMOL/L (ref 20–32)
CREAT SERPL-MCNC: 0.78 MG/DL (ref 0.52–1.04)
DIFFERENTIAL METHOD BLD: ABNORMAL
EOSINOPHIL # BLD AUTO: 0.1 10E9/L (ref 0–0.7)
EOSINOPHIL NFR BLD AUTO: 1.5 %
ERYTHROCYTE [DISTWIDTH] IN BLOOD BY AUTOMATED COUNT: 12.7 % (ref 10–15)
GFR SERPL CREATININE-BSD FRML MDRD: 85 ML/MIN/{1.73_M2}
GLUCOSE SERPL-MCNC: 93 MG/DL (ref 70–99)
HCT VFR BLD AUTO: 49.3 % (ref 35–47)
HDLC SERPL-MCNC: 43 MG/DL
HGB BLD-MCNC: 16.5 G/DL (ref 11.7–15.7)
LDLC SERPL CALC-MCNC: 159 MG/DL
LYMPHOCYTES # BLD AUTO: 2 10E9/L (ref 0.8–5.3)
LYMPHOCYTES NFR BLD AUTO: 26.7 %
MCH RBC QN AUTO: 31.1 PG (ref 26.5–33)
MCHC RBC AUTO-ENTMCNC: 33.5 G/DL (ref 31.5–36.5)
MCV RBC AUTO: 93 FL (ref 78–100)
MONOCYTES # BLD AUTO: 0.5 10E9/L (ref 0–1.3)
MONOCYTES NFR BLD AUTO: 6.5 %
NEUTROPHILS # BLD AUTO: 4.8 10E9/L (ref 1.6–8.3)
NEUTROPHILS NFR BLD AUTO: 64.6 %
NONHDLC SERPL-MCNC: 175 MG/DL
PLATELET # BLD AUTO: 265 10E9/L (ref 150–450)
POTASSIUM SERPL-SCNC: 4.4 MMOL/L (ref 3.4–5.3)
PROT SERPL-MCNC: 8.1 G/DL (ref 6.8–8.8)
RBC # BLD AUTO: 5.31 10E12/L (ref 3.8–5.2)
SODIUM SERPL-SCNC: 138 MMOL/L (ref 133–144)
TRIGL SERPL-MCNC: 80 MG/DL
TSH SERPL DL<=0.005 MIU/L-ACNC: 1.26 MU/L (ref 0.4–4)
WBC # BLD AUTO: 7.4 10E9/L (ref 4–11)

## 2019-02-08 PROCEDURE — 99396 PREV VISIT EST AGE 40-64: CPT | Performed by: PHYSICIAN ASSISTANT

## 2019-02-08 PROCEDURE — 99213 OFFICE O/P EST LOW 20 MIN: CPT | Mod: 25 | Performed by: PHYSICIAN ASSISTANT

## 2019-02-08 PROCEDURE — 80061 LIPID PANEL: CPT | Performed by: PHYSICIAN ASSISTANT

## 2019-02-08 PROCEDURE — 36415 COLL VENOUS BLD VENIPUNCTURE: CPT | Performed by: PHYSICIAN ASSISTANT

## 2019-02-08 PROCEDURE — 84443 ASSAY THYROID STIM HORMONE: CPT | Performed by: PHYSICIAN ASSISTANT

## 2019-02-08 PROCEDURE — 85025 COMPLETE CBC W/AUTO DIFF WBC: CPT | Performed by: PHYSICIAN ASSISTANT

## 2019-02-08 PROCEDURE — 80053 COMPREHEN METABOLIC PANEL: CPT | Performed by: PHYSICIAN ASSISTANT

## 2019-02-08 RX ORDER — SERTRALINE HYDROCHLORIDE 100 MG/1
100 TABLET, FILM COATED ORAL DAILY
Qty: 90 TABLET | Refills: 1 | Status: SHIPPED | OUTPATIENT
Start: 2019-02-08 | End: 2019-11-19

## 2019-02-08 ASSESSMENT — ANXIETY QUESTIONNAIRES
IF YOU CHECKED OFF ANY PROBLEMS ON THIS QUESTIONNAIRE, HOW DIFFICULT HAVE THESE PROBLEMS MADE IT FOR YOU TO DO YOUR WORK, TAKE CARE OF THINGS AT HOME, OR GET ALONG WITH OTHER PEOPLE: VERY DIFFICULT
2. NOT BEING ABLE TO STOP OR CONTROL WORRYING: NEARLY EVERY DAY
6. BECOMING EASILY ANNOYED OR IRRITABLE: NEARLY EVERY DAY
1. FEELING NERVOUS, ANXIOUS, OR ON EDGE: NEARLY EVERY DAY
4. TROUBLE RELAXING: SEVERAL DAYS
GAD7 TOTAL SCORE: 14
5. BEING SO RESTLESS THAT IT IS HARD TO SIT STILL: NOT AT ALL
3. WORRYING TOO MUCH ABOUT DIFFERENT THINGS: NEARLY EVERY DAY
7. FEELING AFRAID AS IF SOMETHING AWFUL MIGHT HAPPEN: SEVERAL DAYS

## 2019-02-08 ASSESSMENT — PATIENT HEALTH QUESTIONNAIRE - PHQ9: SUM OF ALL RESPONSES TO PHQ QUESTIONS 1-9: 14

## 2019-02-08 ASSESSMENT — MIFFLIN-ST. JEOR: SCORE: 1742.23

## 2019-02-08 NOTE — PROGRESS NOTES
SUBJECTIVE:   CC: Eli Burton is an 53 year old woman who presents for preventive health visit.     Physical     Question 2/8/2019  7:23 AM CST   Please respond to the following questions regarding your annual exam.  These will be reviewed by your provider at the time of your appointment and will become a permanent part of your medical record.     Outside of work, how many days during the week do you exercise? 1 day/week   If you drink alcohol do you typically have greater than 3 drinks per day OR greater than 7 drinks per week? No   Do you get at least 3 servings of foods that have calcium each day (dairy, green leafy vegetables, etc)? Yes   Do you have a special diet?  regular (no restrictions)   Do you have any problems taking medications regularly? No   Side effects from medication: None   Have you had an eye exam in the past two years? No   Do you see a dentist twice per year? Yes   Do you have sleep apnea, excessive snoring or daytime drowsiness? Excessive snoring   Over the past 2 weeks, how often have you been bothered by any of the following problems?    Q1: Little interest or pleasure in doing things More than half the days   Q2: Feeling down, depressed or hopeless More than half the days   Do you have any additional concerns to address? No   PHQ-2 Score (range: 0 - 6) 4   [facility] Annual Exercise Outside Of Work     Question 2/8/2019  7:23 AM CST   Outside of work, approximately how many minutes a day do you exercise? Less than 15 minutes       54 y/o female here for well exam.  It has been a few years since I have seen her last.  She does have history of anxiety and has not been on zoloft in several months, and her symptoms have been flared up quite a bit.  She admits that works is stressful.  She is even getting some depression on top, which she has never really had in the past.      She has been dealing with some sinus congestion and pressure for the last several weeks.  Worse at night  when she lies down.  Has used some flonase, mild help.  Lots of sick co-workers.  Does continue to smoke.  Denies fevers or chills.      She also did recently go through Hep C treatment, tolerated well, and recent labs show no sign of disease  '    Today's PHQ-2 Score:   PHQ-2 ( 1999 [employer]) 2/8/2019   Q1: Little interest or pleasure in doing things -   Q2: Feeling down, depressed or hopeless -   PHQ-2 Score -   Q1: Little interest or pleasure in doing things More than half the days   Q2: Feeling down, depressed or hopeless More than half the days   PHQ-2 Score 4     PHQ-9 score:    PHQ-9 SCORE 2/8/2019   PHQ-9 Total Score -   PHQ-9 Total Score MyChart 14 (Moderate depression)   PHQ-9 Total Score 14     DAVID-7 SCORE 3/21/2017 2/8/2019   Total Score 8 14                 Abuse: Current or Past(Physical, Sexual or Emotional)- NO  Do you feel safe in your environment? YES    Social History     Tobacco Use     Smoking status: Current Every Day Smoker     Packs/day: 0.50     Types: Cigarettes     Smokeless tobacco: Never Used   Substance Use Topics     Alcohol use: Yes     Alcohol/week: 0.0 oz     Comment: 0-10 light beers weekly     No flowsheet data found.No flowsheet data found.    Reviewed orders with patient.  Reviewed health maintenance and updated orders accordingly - Yes  BP Readings from Last 3 Encounters:   02/08/19 132/86   04/26/18 140/90   09/18/17 135/82    Wt Readings from Last 3 Encounters:   02/08/19 109.8 kg (242 lb)   04/26/18 108 kg (238 lb)   09/18/17 107.3 kg (236 lb 8 oz)                    Mammogram Screening: Patient over age 50, mutual decision to screen reflected in health maintenance.    Pertinent mammograms are reviewed under the imaging tab.  History of abnormal Pap smear: NO - age 30-65 PAP every 5 years with negative HPV co-testing recommended  PAP / HPV Latest Ref Rng & Units 3/21/2017   PAP - NIL   HPV 16 DNA NEG Negative   HPV 18 DNA NEG Negative   OTHER HR HPV NEG Negative     Reviewed  "and updated as needed this visit by clinical staff  Tobacco  Allergies  Meds  Med Hx  Surg Hx  Fam Hx  Soc Hx        Reviewed and updated as needed this visit by Provider            Review of Systems  CONSTITUTIONAL: NEGATIVE for fever, chills, change in weight  INTEGUMENTARU/SKIN: NEGATIVE for worrisome rashes, moles or lesions  EYES: NEGATIVE for vision changes or irritation  ENT: NEGATIVE for ear, mouth and throat problems  RESP: NEGATIVE for significant cough or SOB  BREAST: NEGATIVE for masses, tenderness or discharge  CV: NEGATIVE for chest pain, palpitations or peripheral edema  GI: NEGATIVE for nausea, abdominal pain, heartburn, or change in bowel habits  : NEGATIVE for unusual urinary or vaginal symptoms. Periods are regular.  MUSCULOSKELETAL: NEGATIVE for significant arthralgias or myalgia  NEURO: NEGATIVE for weakness, dizziness or paresthesias  PSYCHIATRIC: as above     OBJECTIVE:   /86   Pulse 90   Temp 98.1  F (36.7  C) (Oral)   Ht 1.721 m (5' 7.75\")   Wt 109.8 kg (242 lb)   SpO2 98%   BMI 37.07 kg/m    Physical Exam  GENERAL: alert and no distress  EYES: Eyes grossly normal to inspection, PERRL and conjunctivae and sclerae normal  HENT: b/l TM dullness with loss of light reflux nasal mucosa is erythematous and edematous   NECK: no adenopathy, no asymmetry, masses, or scars and thyroid normal to palpation  RESP: lungs clear to auscultation - no rales, rhonchi or wheezes  CV: regular rate and rhythm, normal S1 S2, no S3 or S4, no murmur, click or rub, no peripheral edema and peripheral pulses strong  MS: no gross musculoskeletal defects noted, no edema  SKIN: no suspicious lesions or rashes  NEURO: Normal strength and tone, mentation intact and speech normal  PSYCH: tearful    Diagnostic Test Results:  none     ASSESSMENT/PLAN:   1. Routine general medical examination at a health care facility    - CBC with platelets differential  - Comprehensive metabolic panel  - TSH with free T4 " "reflex    2. Moderate episode of recurrent major depressive disorder (H)  Discussed counseling and therapy, but she is not interested in that at this time.  Will restart zoloft, 50 mg for 1 week then back up to 100 mg.    - sertraline (ZOLOFT) 100 MG tablet; Take 1 tablet (100 mg) by mouth daily  Dispense: 90 tablet; Refill: 1    3. S/P parathyroidectomy (H)  Doing well, no current symptoms.    4. Morbid obesity (H)  Discussed dietary and lifestyle changes    5. Acute sinusitis with symptoms > 10 days  OTC probiotics while on antibiotic to help limit some potential side effects.   - amoxicillin-clavulanate (AUGMENTIN) 875-125 MG tablet; Take 1 tablet by mouth 2 times daily for 10 days  Dispense: 20 tablet; Refill: 0    6. Anxiety    - sertraline (ZOLOFT) 100 MG tablet; Take 1 tablet (100 mg) by mouth daily  Dispense: 90 tablet; Refill: 1  - TSH with free T4 reflex    7. Hyperlipidemia LDL goal <130    - Comprehensive metabolic panel  - Lipid panel reflex to direct LDL Fasting    8. Special screening for malignant neoplasms, colon    - Fecal colorectal cancer screen FIT; Future    COUNSELING:  Reviewed preventive health counseling, as reflected in patient instructions       Regular exercise       Healthy diet/nutrition       Colon cancer screening    BP Readings from Last 1 Encounters:   02/08/19 132/86     Estimated body mass index is 37.07 kg/m  as calculated from the following:    Height as of this encounter: 1.721 m (5' 7.75\").    Weight as of this encounter: 109.8 kg (242 lb).    BP Screening:   Last 3 BP Readings:    BP Readings from Last 3 Encounters:   02/08/19 132/86   04/26/18 140/90   09/18/17 135/82       The following was recommended to the patient:  Re-screen BP within a year and recommended lifestyle modifications  Weight management plan: Discussed healthy diet and exercise guidelines     reports that she has been smoking cigarettes.  She has been smoking about 0.50 packs per day. she has never used " smokeless tobacco.  Tobacco Cessation Action Plan: Information offered: Patient not interested at this time    Counseling Resources:  ATP IV Guidelines  Pooled Cohorts Equation Calculator  Breast Cancer Risk Calculator  FRAX Risk Assessment  ICSI Preventive Guidelines  Dietary Guidelines for Americans, 2010  USDA's MyPlate  ASA Prophylaxis  Lung CA Screening    Wei Ybarra PA-C  [facility]

## 2019-02-08 NOTE — NURSING NOTE
"Chief Complaint   Patient presents with     Physical     pt is fasting     /86   Pulse 90   Temp 98.1  F (36.7  C) (Oral)   Ht 1.721 m (5' 7.75\")   Wt 109.8 kg (242 lb)   SpO2 98%   BMI 37.07 kg/m   Estimated body mass index is 37.07 kg/m  as calculated from the following:    Height as of this encounter: 1.721 m (5' 7.75\").    Weight as of this encounter: 109.8 kg (242 lb).        Health Maintenance due pending provider review:  Colonoscopy/FIT    Fit test pended    Palmira Adams CMA  "

## 2019-02-09 ASSESSMENT — ANXIETY QUESTIONNAIRES: GAD7 TOTAL SCORE: 14

## 2019-02-28 PROCEDURE — 82274 ASSAY TEST FOR BLOOD FECAL: CPT | Performed by: PHYSICIAN ASSISTANT

## 2019-03-03 LAB — HEMOCCULT STL QL IA: NEGATIVE

## 2019-03-04 DIAGNOSIS — Z12.11 SPECIAL SCREENING FOR MALIGNANT NEOPLASMS, COLON: ICD-10-CM

## 2019-11-19 ENCOUNTER — OFFICE VISIT (OUTPATIENT)
Dept: FAMILY MEDICINE | Facility: CLINIC | Age: 54
End: 2019-11-19
Payer: COMMERCIAL

## 2019-11-19 VITALS
HEART RATE: 77 BPM | WEIGHT: 241 LBS | OXYGEN SATURATION: 96 % | RESPIRATION RATE: 16 BRPM | BODY MASS INDEX: 36.53 KG/M2 | HEIGHT: 68 IN | DIASTOLIC BLOOD PRESSURE: 82 MMHG | SYSTOLIC BLOOD PRESSURE: 135 MMHG | TEMPERATURE: 98.1 F
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
## 2019-11-19 DIAGNOSIS — F41.9 ANXIETY: ICD-10-CM

## 2019-11-19 DIAGNOSIS — H00.015 HORDEOLUM EXTERNUM OF LEFT LOWER EYELID: ICD-10-CM

## 2019-11-19 DIAGNOSIS — H02.9 EYELID LESION: ICD-10-CM

## 2019-11-19 PROCEDURE — 96127 BRIEF EMOTIONAL/BEHAV ASSMT: CPT | Performed by: PHYSICIAN ASSISTANT

## 2019-11-19 PROCEDURE — 99214 OFFICE O/P EST MOD 30 MIN: CPT | Performed by: PHYSICIAN ASSISTANT

## 2019-11-19 RX ORDER — POLYMYXIN B SULFATE AND TRIMETHOPRIM 1; 10000 MG/ML; [USP'U]/ML
1-2 SOLUTION OPHTHALMIC EVERY 4 HOURS
Qty: 1 BOTTLE | Refills: 0 | Status: SHIPPED | OUTPATIENT
Start: 2019-11-19 | End: 2022-05-23

## 2019-11-19 RX ORDER — SERTRALINE HYDROCHLORIDE 100 MG/1
100 TABLET, FILM COATED ORAL DAILY
Qty: 90 TABLET | Refills: 1 | Status: SHIPPED | OUTPATIENT
Start: 2019-11-19 | End: 2020-05-07

## 2019-11-19 ASSESSMENT — MIFFLIN-ST. JEOR: SCORE: 1732.7

## 2019-11-19 ASSESSMENT — PATIENT HEALTH QUESTIONNAIRE - PHQ9: SUM OF ALL RESPONSES TO PHQ QUESTIONS 1-9: 5

## 2019-11-19 NOTE — PROGRESS NOTES
Subjective     Eli Burton is a 54 year old female who presents to clinic today for the following health issues:    HPI   Cat bite left hand,eye issue and zoloft refill    She was playing with a cat over weekend and did get bit.  Mild pain.  Bled some.  Has scabbed over, and really looks ok, she just knows that that cat bites can get bad quickly.    She also has two concerns on her eyes.  She has an acute problem with swelling and a bit of pain left lower lid.  Thinks it is stye.  She also has eyelid lesions she has had for years.    Anxiety Follow-Up    How are you doing with your anxiety since your last visit? Improved     Are you having other symptoms that might be associated with anxiety? No    Have you had a significant life event? No     Are you feeling depressed? No    Do you have any concerns with your use of alcohol or other drugs? No    Social History     Tobacco Use     Smoking status: Current Every Day Smoker     Packs/day: 0.50     Types: Cigarettes     Smokeless tobacco: Never Used   Substance Use Topics     Alcohol use: Yes     Alcohol/week: 0.0 standard drinks     Comment: 0-10 light beers weekly     Drug use: Yes     Types: Marijuana     DAVID-7 SCORE 3/21/2017 2/8/2019   Total Score 8 14     PHQ 6/21/2017 2/8/2019 11/19/2019   PHQ-9 Total Score 1 14 5   Q9: Thoughts of better off dead/self-harm past 2 weeks Not at all Not at all Not at all           BP Readings from Last 3 Encounters:   11/19/19 135/82   02/08/19 132/86   04/26/18 140/90    Wt Readings from Last 3 Encounters:   11/19/19 109.3 kg (241 lb)   02/08/19 109.8 kg (242 lb)   04/26/18 108 kg (238 lb)                      Reviewed and updated as needed this visit by Provider         Review of Systems   ROS COMP: Constitutional, HEENT, cardiovascular, pulmonary, gi and gu systems are negative, except as otherwise noted.      Objective    /82 (BP Location: Left arm, Cuff Size: Adult Large)   Pulse 77   Temp 98.1  F (36.7  C)  "(Oral)   Resp 16   Ht 1.721 m (5' 7.75\")   Wt 109.3 kg (241 lb)   SpO2 96%   BMI 36.91 kg/m    Body mass index is 36.91 kg/m .  Physical Exam   GENERAL: alert and no distress  EYES: eyelids- b/t yellow patches/nodules over upper lids.  Lower left hordeolum  HENT: ear canals and TM's normal, nose and mouth without ulcers or lesions  RESP: lungs clear to auscultation - no rales, rhonchi or wheezes  CV: regular rate and rhythm, normal S1 S2, no S3 or S4, no murmur, click or rub, no peripheral edema and peripheral pulses strong  PSYCH: mentation appears normal, affect normal/bright  SKIN:  Puncture wound over left hand.  No sign of surrounding erythema or induration    Diagnostic Test Results:  Labs reviewed in Epic        Assessment & Plan     1. Cat bite, initial encounter  Looks ok now, but will keep antibiotic on hand in case this changes.  - amoxicillin-clavulanate (AUGMENTIN) 875-125 MG tablet; Take 1 tablet by mouth 2 times daily for 10 days  Dispense: 20 tablet; Refill: 0    2. Hordeolum externum of left lower eyelid  At home care instructions given.  - trimethoprim-polymyxin b (POLYTRIM) 63270-3.1 UNIT/ML-% ophthalmic solution; Place 1-2 drops Into the left eye every 4 hours  Dispense: 1 Bottle; Refill: 0    3. Anxiety  Stable, working well  - sertraline (ZOLOFT) 100 MG tablet; Take 1 tablet (100 mg) by mouth daily  Dispense: 90 tablet; Refill: 1    4. Eyelid lesion    - OPHTHALMOLOGY ADULT REFERRAL     Tobacco Cessation:   reports that she has been smoking cigarettes. She has been smoking about 0.50 packs per day. She has never used smokeless tobacco.        BMI:   Estimated body mass index is 36.91 kg/m  as calculated from the following:    Height as of this encounter: 1.721 m (5' 7.75\").    Weight as of this encounter: 109.3 kg (241 lb).               Return in about 6 months (around 5/19/2020).    Wei Ybarra PA-C  [facility]        "

## 2019-11-19 NOTE — PATIENT INSTRUCTIONS
PLEASE CALL TO SCHEDULE YOUR MAMMOGRAM  [facility] [place] [phone]  [facility] [place] [phone]  [facility]   [phone]  Central Scheduling (all locations) [phone]    If you are under/uninsured, we recommend you contact the [facility]. They offer mammograms on a sliding fee charge. You can schedule with them at [phone].      Patient Education     Sty (or Stye)  A sty is an infection of the oil gland of the eyelid. It may develop into a small pocket of pus (an abscess). This can cause pain, redness, and swelling. In early stages, a sty is treated with antibiotic cream, eye drops, or a small towel soaked in warm water (a warm compress). More severe cases may need to be opened and drained by a healthcare provider.  Home care    Eye drops or ointment are usually prescribed to treat the infection. Use these as directed.     Artificial tears may also be used to lubricate the eye and make it more comfortable. You can buy these over the counter without a prescription. Talk with your healthcare provider before using any over-the-counter treatment for a sty.    Apply a warm, damp towel to the affected eye for at least 5 minutes, 3 to 4 times a day for a week. Warm compresses open the pores and speed the healing. But if the compresses are too hot, they may burn your eyelid.    Sometimes the sty will drain with this treatment alone. If this happens, keep using the antibiotic until all the redness and swelling are gone.    Wash your hands before and after touching the infected eyelid to avoid spreading the infection.    Don t squeeze or try to break open the sty.  Follow-up care  Follow up with your healthcare provider, or as advised.   When to seek medical advice  Call your healthcare provider right away if any of these occur:    Increase in swelling or redness around the eyelid after 48 to 72 hours    Increase in eye pain or the eyelid blisters    Increase in  warmth--the eyelid feels hot    Drainage of blood or thick pus from the sty    Blister on the eyelid    Inability to open the eyelid due to swelling    Fever of 100.4 F (38 C) or above, or as directed by your provider    Vision changes    Headache or stiff neck    The sty comes back  Date Last Reviewed: 8/1/2017 2000-2018 [employer]. [address] [place] [address]. All rights reserved. This information is not intended as a substitute for professional medical care. Always follow your healthcare professional's instructions.

## 2019-11-19 NOTE — NURSING NOTE
"Chief Complaint   Patient presents with     Recheck Medication     zoloft     Cat Bite     left hand saturday     Eye Problem     left eye crusty     initial /82 (BP Location: Left arm, Cuff Size: Adult Large)   Pulse 77   Temp 98.1  F (36.7  C) (Oral)   Resp 16   Ht 1.721 m (5' 7.75\")   Wt 109.3 kg (241 lb)   SpO2 96%   BMI 36.91 kg/m   Estimated body mass index is 36.91 kg/m  as calculated from the following:    Height as of this encounter: 1.721 m (5' 7.75\").    Weight as of this encounter: 109.3 kg (241 lb).  BP completed using cuff size: large.  L  arm      Health Maintenance that is potentially due pending provider review:  Mammogram    MAMMO/COLON--Gave pt phone number, and pended order for Mammo and/or Colonoscopy    Julio Gamez ma  "

## 2020-06-03 ENCOUNTER — HOSPITAL ENCOUNTER (EMERGENCY)
Facility: CLINIC | Age: 55
Discharge: HOME | End: 2020-06-03
Attending: PHYSICIAN ASSISTANT | Admitting: PHYSICIAN ASSISTANT
Payer: COMMERCIAL

## 2020-06-03 ENCOUNTER — APPOINTMENT (OUTPATIENT)
Dept: GENERAL RADIOLOGY | Facility: CLINIC | Age: 55
End: 2020-06-03
Attending: FAMILY MEDICINE
Payer: COMMERCIAL

## 2020-06-03 VITALS
WEIGHT: 235 LBS | RESPIRATION RATE: 20 BRPM | OXYGEN SATURATION: 97 % | BODY MASS INDEX: 36 KG/M2 | DIASTOLIC BLOOD PRESSURE: 91 MMHG | SYSTOLIC BLOOD PRESSURE: 176 MMHG | TEMPERATURE: 98.6 F

## 2020-06-03 DIAGNOSIS — S52.502A CLOSED FRACTURE OF DISTAL END OF LEFT RADIUS, UNSPECIFIED FRACTURE MORPHOLOGY, INITIAL ENCOUNTER: ICD-10-CM

## 2020-06-03 PROCEDURE — 99284 EMERGENCY DEPT VISIT MOD MDM: CPT | Mod: 25 | Performed by: PHYSICIAN ASSISTANT

## 2020-06-03 PROCEDURE — 25600 CLTX DST RDL FX/EPHYS SEP WO: CPT | Mod: 54 | Performed by: PHYSICIAN ASSISTANT

## 2020-06-03 PROCEDURE — 73110 X-RAY EXAM OF WRIST: CPT | Mod: LT

## 2020-06-03 PROCEDURE — 25600 CLTX DST RDL FX/EPHYS SEP WO: CPT | Mod: LT | Performed by: PHYSICIAN ASSISTANT

## 2020-06-03 RX ORDER — OXYCODONE HYDROCHLORIDE 5 MG/1
5 TABLET ORAL EVERY 6 HOURS PRN
Qty: 10 TABLET | Refills: 0 | Status: SHIPPED | OUTPATIENT
Start: 2020-06-03 | End: 2020-06-05

## 2020-06-03 ASSESSMENT — ENCOUNTER SYMPTOMS
NEUROLOGICAL NEGATIVE: 1
CONSTITUTIONAL NEGATIVE: 1

## 2020-06-03 NOTE — LETTER
Maddy 3, 2020      To Whom It May Concern:      Eli Burton was seen in our Emergency Department today, 06/03/20.  Please allow patient to wear left wrist splint and sling at work until evaluated by orthopedics.  No use of this left arm due to wrist fracture.  Thank you.      Sincerely,        Lorena Cam PA-C

## 2020-06-03 NOTE — ED AVS SNAPSHOT
[facility] Emergency Department  [address] [address]  Phone:  [phone]  Fax:  [phone]                                    Eli Burton   MRN: [id number]    Department:  [facility] Emergency Department   Date of Visit:  6/3/2020           After Visit Summary Signature Page    I have received my discharge instructions, and my questions have been answered. I have discussed any challenges I see with this plan with the nurse or doctor.    ..........................................................................................................................................  Patient/Patient Representative Signature      ..........................................................................................................................................  Patient Representative Print Name and Relationship to Patient    ..................................................               ................................................  Date                                   Time    ..........................................................................................................................................  Reviewed by Signature/Title    ...................................................              ..............................................  Date                                               Time          22EPIC Rev 08/18

## 2020-06-03 NOTE — ED PROVIDER NOTES
History     Chief Complaint   Patient presents with     Wrist Pain     left, tripped last night breaking fall with wrist     HPI  Eli Burton is a 55 year old female who presents with complaints of left wrist pain and swelling since she fell last night.  Patient states [place], landing on an outstretched left arm.  Patient has had pain and swelling as well as limited range of motion of her left wrist since that time.  Patient is right-hand dominant.  She denies any other injuries from the fall.        Allergies:  No Known Allergies    Problem List:    Patient Active Problem List    Diagnosis Date Noted     Obesity (BMI 35.0-39.9) with comorbidity (H) 02/08/2019     Priority: Medium     History of hepatitis C 03/29/2017     Priority: Medium     Family history of colon cancer 03/21/2017     Priority: Medium     S/P parathyroidectomy 12/15/2016     Priority: Medium     Hyperlipidemia LDL goal <130      Priority: Medium     Trigger finger, acquired 08/09/2014     Priority: Medium     Problem list name updated by automated process. Provider to review       CTS (carpal tunnel syndrome) 07/20/2014     Priority: Medium     Tobacco abuse 06/10/2014     Priority: Medium     Anxiety 06/10/2014     Priority: Medium        Past Medical History:    Past Medical History:   Diagnosis Date     Anxiety      CTS (carpal tunnel syndrome) 7/20/2014     Depression      Headache      Hyperlipidemia LDL goal <130        Past Surgical History:    Past Surgical History:   Procedure Laterality Date     PARATHYROIDECTOMY N/A 12/23/2014    Procedure: PARATHYROIDECTOMY;  Surgeon: Anusha Chan MD;  Location:  OR       Family History:    Family History   Problem Relation Age of Onset     Breast Cancer Maternal Grandmother      Cancer - colorectal Maternal Grandfather      Heart Surgery Sister         heart attack     Cerebrovascular Disease No family  hx of        Social History:  Marital Status:   [2]  Social History     Tobacco Use     Smoking status: Current Every Day Smoker     Packs/day: 0.50     Types: Cigarettes     Smokeless tobacco: Never Used   Substance Use Topics     Alcohol use: Yes     Alcohol/week: 0.0 standard drinks     Comment: 0-10 light beers weekly     Drug use: Yes     Types: Marijuana        Medications:    oxyCODONE (ROXICODONE) 5 MG tablet  Fluticasone Propionate (FLONASE NA)  sertraline (ZOLOFT) 100 MG tablet  trimethoprim-polymyxin b (POLYTRIM) 40809-9.1 UNIT/ML-% ophthalmic solution          Review of Systems   Constitutional: Negative.    Musculoskeletal:        Left wrist pain   Skin: Negative.    Neurological: Negative.    All other systems reviewed and are negative.      Physical Exam   BP: (!) 176/91  Heart Rate: 81  Temp: 98.6  F (37  C)  Resp: 20  Weight: 106.6 kg (235 lb)  SpO2: 97 %      Physical Exam  Constitutional:       General: She is not in acute distress.     Appearance: Normal appearance. She is not ill-appearing, toxic-appearing or diaphoretic.   HENT:      Head: Normocephalic and atraumatic.   Cardiovascular:      Pulses: Normal pulses.   Pulmonary:      Effort: Pulmonary effort is normal.   Musculoskeletal:      Left elbow: Normal.      Left wrist: She exhibits decreased range of motion, tenderness, bony tenderness and swelling. She exhibits no crepitus, no deformity and no laceration.      Left hand: Normal. She exhibits normal range of motion, no tenderness, no bony tenderness, normal capillary refill, no deformity, no laceration and no swelling. Normal sensation noted. Normal strength noted.   Skin:     General: Skin is warm and dry.   Neurological:      General: No focal deficit present.      Mental Status: She is alert.      Sensory: Sensation is intact.      Motor: Motor function is intact.         ED Course        [facility]    Splint Application    Date/Time: 6/3/2020 4:00  PM  Performed by: Lorena Cam PA-C  Authorized by: Lorena Cam PA-C       PRE-PROCEDURE DETAILS     Sensation:  Normal    Skin color:  Pink    PROCEDURE DETAILS     Laterality:  Left    Location:  Wrist    Wrist:  L wrist    Splint type:  Sugar tong    Supplies:  Ortho-Glass    POST PROCEDURE DETAILS     Pain:  Unchanged    Sensation:  Normal    Skin color:  Pink    Patient tolerance of procedure:  Patient tolerated the procedure well with no immediate complications    PROCEDURE   Patient Tolerance:  Patient tolerated the procedure well with no immediate complications            Results for orders placed or performed during the hospital encounter of 06/03/20 (from the past 24 hour(s))   XR Wrist Left G/E 3 Views    Narrative    LEFT WRIST THREE OR MORE VIEWS   6/3/2020 2:11 PM     HISTORY: Left wrist pain after a fall.    COMPARISON: None.      Impression    IMPRESSION: Nondisplaced transverse oblique fracture distal radial  metaphysis. No evidence of scaphoid fracture. Degenerative changes at  the base of the thumb.       Medications - No data to display    Assessments & Plan (with Medical Decision Making)     Pt is a 55 year old female who presents with complaints of left wrist pain and swelling since she fell last night.  Patient states she [place] railroad [place], landing on an outstretched left arm.  Patient has had pain and swelling as well as limited range of motion of her left wrist since that time.  Pt is afebrile on arrival.  Exam as above.  X-rays of left wrist show a nondisplaced transverse oblique fracture through the distal radius.  Discussed results with patient.  Splint and sling were applied (see above procedure note).  Return precautions were reviewed.  Hand-outs were provided.    Patient was sent with Oxycodone and was instructed to follow-up with orthopedics in 3-5 days for continued care and management (referral was made).   She is to return to the ED for persistent and/or worsening symptoms.  Patient expressed understanding of the diagnosis and plan and was discharged home in good condition.    I have reviewed the nursing notes.    I have reviewed the findings, diagnosis, plan and need for follow up with the patient.    New Prescriptions    OXYCODONE (ROXICODONE) 5 MG TABLET    Take 1 tablet (5 mg) by mouth every 6 hours as needed for moderate to severe pain       Final diagnoses:   Closed fracture of distal end of left radius, unspecified fracture morphology, initial encounter       6/3/2020   [facility] EMERGENCY DEPARTMENT      Disclaimer:  This note consists of symbols derived from keyboarding, dictation and/or voice recognition software.  As a result, there may be errors in the script that have gone undetected.  Please consider this when interpreting information found in this chart.     Lorena Cam PA-C  06/03/20 [id number]

## 2020-06-05 ENCOUNTER — TELEPHONE (OUTPATIENT)
Dept: FAMILY MEDICINE | Facility: CLINIC | Age: 55
End: 2020-06-05

## 2020-06-05 ENCOUNTER — VIRTUAL VISIT (OUTPATIENT)
Dept: FAMILY MEDICINE | Facility: CLINIC | Age: 55
End: 2020-06-05
Payer: COMMERCIAL

## 2020-06-05 DIAGNOSIS — S62.102D CLOSED FRACTURE OF LEFT WRIST WITH ROUTINE HEALING, SUBSEQUENT ENCOUNTER: Primary | ICD-10-CM

## 2020-06-05 PROCEDURE — 99213 OFFICE O/P EST LOW 20 MIN: CPT | Mod: 95 | Performed by: FAMILY MEDICINE

## 2020-06-05 RX ORDER — OXYCODONE HYDROCHLORIDE 5 MG/1
5 TABLET ORAL EVERY 6 HOURS PRN
Qty: 10 TABLET | Refills: 0 | Status: CANCELLED | OUTPATIENT
Start: 2020-06-05

## 2020-06-05 RX ORDER — OXYCODONE HYDROCHLORIDE 5 MG/1
5 TABLET ORAL EVERY 6 HOURS PRN
Qty: 10 TABLET | Refills: 0 | Status: SHIPPED | OUTPATIENT
Start: 2020-06-05 | End: 2022-05-23

## 2020-06-05 NOTE — PROGRESS NOTES
"Eli Burton is a 55 year old female who is being evaluated via a billable telephone visit.      The patient has been notified of following:     \"This telephone visit will be conducted via a call between you and your physician/provider. We have found that certain health care needs can be provided without the need for a physical exam.  This service lets us provide the care you need with a short phone conversation.  If a prescription is necessary we can send it directly to your pharmacy.  If lab work is needed we can place an order for that and you can then stop by our lab to have the test done at a later time.    Telephone visits are billed at different rates depending on your insurance coverage. During this emergency period, for some insurers they may be billed the same as an in-person visit.  Please reach out to your insurance provider with any questions.    If during the course of the call the physician/provider feels a telephone visit is not appropriate, you will not be charged for this service.\"    Patient has given verbal consent for Telephone visit?  Yes    What phone number would you like to be contacted at? [phone]    How would you like to obtain your AVS? Mail a copy    Subjective     Eli Burton is a 55 year old female who presents via phone visit today for the following health issues:    HPI  Medication Followup of oxycodone    Taking Medication as prescribed: yes    Side Effects:  None    Medication Helping Symptoms:  yes      Wrist fracture - left   Tripped walking up steps on [place]   Went to ER at [place] - Wednesday  Was placed in hard splint -   Not too tight    Was given oxycodone 5mg - taking one every 6 hours   Given 10 and has 2 left  Hasn't tried anything else.    No allergies -   Constipation  - no issues      -------------------------------------    Patient Active Problem List   Diagnosis     Tobacco abuse     Anxiety     CTS (carpal tunnel syndrome)     " Trigger finger, acquired     S/P parathyroidectomy     Hyperlipidemia LDL goal <130     Family history of colon cancer     History of hepatitis C     Obesity (BMI 35.0-39.9) with comorbidity (H)     Past Surgical History:   Procedure Laterality Date     PARATHYROIDECTOMY N/A 12/23/2014    Procedure: PARATHYROIDECTOMY;  Surgeon: Anusha Chan MD;  Location:  OR       Social History     Tobacco Use     Smoking status: Current Every Day Smoker     Packs/day: 0.50     Types: Cigarettes     Smokeless tobacco: Never Used   Substance Use Topics     Alcohol use: Yes     Alcohol/week: 0.0 standard drinks     Comment: 0-10 light beers weekly     Family History   Problem Relation Age of Onset     Breast Cancer Maternal Grandmother      Cancer - colorectal Maternal Grandfather      Heart Surgery Sister         heart attack     Cerebrovascular Disease No family hx of            Reviewed and updated as needed this visit by Provider         Review of Systems   Constitutional, HEENT, cardiovascular, pulmonary, gi and gu systems are negative, except as otherwise noted.       Objective   Reported vitals:  There were no vitals taken for this visit.   healthy, alert and no distress  PSYCH: Alert and oriented times 3; coherent speech, normal   rate and volume, able to articulate logical thoughts, able   to abstract reason, no tangential thoughts, no hallucinations   or delusions  Her affect is normal  RESP: No cough, no audible wheezing, able to talk in full sentences  Remainder of exam unable to be completed due to telephone visits    Diagnostic Test Results:  Labs reviewed in Epic        Assessment/Plan:  1. Closed fracture of left wrist with routine healing, subsequent encounter  Wrist fracture  Pt using oxycodone 1 tablet every 6 hours - she has two tablets leftg  Advised to cut back if she can and substitute tylenol for pain as well  Advised risk for constipation with med and getting Senna prn   appt with ortho/sport med  on Monday   - oxyCODONE (ROXICODONE) 5 MG tablet; Take 1 tablet (5 mg) by mouth every 6 hours as needed for moderate to severe pain  Dispense: 10 tablet; Refill: 0    No follow-ups on file.      Phone call duration:  6 minutes    Krupa Olvera DO

## 2020-06-05 NOTE — TELEPHONE ENCOUNTER
PN,   Please advise on refill in JS' absence    Patient seen in ER 6/3/2020 for wrist fracture  Per below, has ortho appt Monday   Requesting Oxycodone until that appt     Reviewed [facility]  Only controlled fill was Oxycodone 6/3/2020 #10 from ER    Tried to call pt to see exactly how many pills she has left - no answer, did NOT leave pt VM    Please advise  Thanks,  Domenica ELIZALDE RN

## 2020-06-05 NOTE — NURSING NOTE
"Chief Complaint   Patient presents with     Refill Request     pain medication- sees ortho on monday wrist fracture     initial There were no vitals taken for this visit. Estimated body mass index is 36 kg/m  as calculated from the following:    Height as of 11/19/19: 1.721 m (5' 7.75\").    Weight as of 6/3/20: 106.6 kg (235 lb).  BP completed using cuff size: .  L  R arm      Health Maintenance that is potentially due pending provider review:      Julio Gamez ma  "

## 2020-06-05 NOTE — TELEPHONE ENCOUNTER
Spoke with patient.  Scheduled her for telephone visit with PN today at 3:30.  Jazmine aMldonado RN

## 2020-06-05 NOTE — TELEPHONE ENCOUNTER
"Reason for Call:  Medication or medication refill:    Do you use a [facility]?  Name of the pharmacy and phone number for the current request:       [facility] [place] - [address]    Name of the medication requested:   oxyCODONE (ROXICODONE) 5 MG tablet     Other request: Pt was seen at an  and they gave her this RX. She cannot get in with a \"bone doctor\" until Monday. She is hoping to have a short term RX to get her to the appointment. She was made aware that she might need a some type of virtual visit to make this happen.     Can we leave a detailed message on this number? YES    Phone number patient can be reached at: Cell number on file:    Telephone Information:   Mobile [phone]       Best Time: Any    Call taken on 6/5/2020 at 11:53 AM by Anne Badillo      "

## 2020-06-08 ENCOUNTER — OFFICE VISIT (OUTPATIENT)
Dept: ORTHOPEDICS | Facility: CLINIC | Age: 55
End: 2020-06-08
Payer: COMMERCIAL

## 2020-06-08 VITALS
HEIGHT: 68 IN | DIASTOLIC BLOOD PRESSURE: 82 MMHG | BODY MASS INDEX: 36.53 KG/M2 | SYSTOLIC BLOOD PRESSURE: 139 MMHG | WEIGHT: 241 LBS

## 2020-06-08 DIAGNOSIS — S52.552A OTHER CLOSED EXTRA-ARTICULAR FRACTURE OF DISTAL END OF LEFT RADIUS, INITIAL ENCOUNTER: Primary | ICD-10-CM

## 2020-06-08 PROCEDURE — 25600 CLTX DST RDL FX/EPHYS SEP WO: CPT | Mod: 55 | Performed by: ORTHOPAEDIC SURGERY

## 2020-06-08 PROCEDURE — 99203 OFFICE O/P NEW LOW 30 MIN: CPT | Mod: 57 | Performed by: ORTHOPAEDIC SURGERY

## 2020-06-08 RX ORDER — HYDROCODONE BITARTRATE AND ACETAMINOPHEN 5; 325 MG/1; MG/1
1 TABLET ORAL EVERY 6 HOURS PRN
Qty: 10 TABLET | Refills: 0 | Status: SHIPPED | OUTPATIENT
Start: 2020-06-08 | End: 2020-06-11

## 2020-06-08 ASSESSMENT — PAIN SCALES - GENERAL: PAINLEVEL: SEVERE PAIN (6)

## 2020-06-08 ASSESSMENT — MIFFLIN-ST. JEOR: SCORE: 1727.7

## 2020-06-08 NOTE — PROGRESS NOTES
Chief Complaint:   Chief Complaint   Patient presents with     Left Wrist - Pain     Left wrist injury. DOI: 6/2/20. 6 days. FOOSH. In a bulky sugartong orthoglass splint. Right hand dominant. Denies prior left upper extremity injuries.        HPI: Eli Burton is a 55 year old female , right -hand dominant, who presents for evaluation and management of a left wrist injury. She injured her hand on the evening of 6/2/2020 while falling, [place] outstretched left upper extremity. Onset of pain, swelling, decreased range of motion. She presented to the emergency room the next day with xrays showing a nondisplaced left distal radius fracture, splinted. She was given oxycodone but didn't like it.    It has been 6 days since the initial injury.      She reports having moderate  pain/discomfort around the injury site. She denies associated numbness or tingling but has numbness and tingling due to carpal tunnel syndrome. She denies any other injuries to her upper extremity.   Symptoms: pain, swelling.  Location of symptoms: left wrist.  Pain severity: 6/10  Pain quality: aching  Frequency of symptoms: are constant  Aggravating factors: movement.  Relieving factors: rest, splint.    Previous treatment: splint, pain medications (roxicodone).    Past medical history:  has a past medical history of Anxiety, CTS (carpal tunnel syndrome) (7/20/2014), Depression, Headache, and Hyperlipidemia LDL goal <130.   Patient Active Problem List    Diagnosis Date Noted     Obesity (BMI 35.0-39.9) with comorbidity (H) 02/08/2019     Priority: Medium     History of hepatitis C 03/29/2017     Priority: Medium     Family history of colon cancer 03/21/2017     Priority: Medium     S/P parathyroidectomy 12/15/2016     Priority: Medium     Hyperlipidemia LDL goal <130      Priority: Medium     Trigger finger, acquired 08/09/2014     Priority: Medium     Problem list name updated  "by automated process. Provider to review       CTS (carpal tunnel syndrome) 07/20/2014     Priority: Medium     Tobacco abuse 06/10/2014     Priority: Medium     Anxiety 06/10/2014     Priority: Medium       Past surgical history:  has a past surgical history that includes Parathyroidectomy (N/A, 12/23/2014).     Medications:    Current Outpatient Medications   Medication Sig Dispense Refill     Fluticasone Propionate (FLONASE NA)        oxyCODONE (ROXICODONE) 5 MG tablet Take 1 tablet (5 mg) by mouth every 6 hours as needed for moderate to severe pain 10 tablet 0     sertraline (ZOLOFT) 100 MG tablet TAKE ONE TABLET BY MOUTH ONCE DAILY (NEED TO BE SEEN IN CLINIC FOR FURTHER REFILLS) 90 tablet 1     trimethoprim-polymyxin b (POLYTRIM) 36660-0.1 UNIT/ML-% ophthalmic solution Place 1-2 drops Into the left eye every 4 hours 1 Bottle 0        Allergies:   No Known Allergies     Family History: family history includes Breast Cancer in her maternal grandmother; Cancer - colorectal in her maternal grandfather; Heart Surgery in her sister.     Social History: dental .  reports that she has been smoking cigarettes. She has been smoking about 0.50 packs per day. She has never used smokeless tobacco. She reports current alcohol use. She reports current drug use. Drug: Marijuana.    Review of Systems:  ROS: 10 point ROS neg other than the symptoms noted above in the HPI and past medical history.    Physical Exam  GENERAL APPEARANCE: healthy, alert, no distress.   SKIN: no suspicious lesions or rashes  NEURO: Normal strength and tone, mentation intact and speech normal  PSYCH:  mentation appears normal and affect normal. Not anxious.  RESPIRATORY: No increased work of breathing.    /82   Ht 1.721 m (5' 7.75\")   Wt 109.3 kg (241 lb)   BMI 36.91 kg/m       left WRIST/HAND EXAM:    The splint was removed    Skin intact. Mild volar ecchymosis.  Normal wear pattern, color and tone.    There is mild swelling in " the left wrist and hand.  There is moderate tenderness in the distal radius of the wrist.  There is mild ecchymosis.  There is no erythema of the surrounding skin.  There is no maceration of the skin.  There is no deformity in the area.  Able to make a full fist without much discomfort.    Intact sensation light touch median, radial, ulnar nerves of the hand  Intact sensation to the radial and ulnar digital nerves of the fingers, as well as the finger tips.  Intact epl fpl fdp edc wrist flexion/extension biceps/triceps deltoid  Brisk capillary refill to all fingers.   Palpable radial pulse, 2+.    X-rays:  3 views left wrist from 6/3/2020 were reviewed in clinic today. On my review, Nondisplaced transverse oblique fracture distal radial metaphysis. No evidence of scaphoid fracture. Degenerative changes at the base of the thumb.    .    Assessment: 56yo RHD female with nondisplaced, left distal radius fracture, appears extra-articular.    Plan:  *   * reviewed xrays with patient. Based on the xrays, the fracture is minimally/mildly displaced and in acceptable alignment. As long as fracture maintains alignment, nonoperative treatment can be pursued. However, if fracture displaces, then the fracture is unstable, and would recommend open-reduction and internal fixation. I did discuss that we would need to monitor this fracture on a weekly basis for the next couple of weeks, such that if fracture does displace, we can catch it sooner rather than later, and proceed with treatment at that time. Patient does understand.   * will immobilize in wrist brace today, to be worn day and night  * elevation for swelling  * finger range of motion.  * norco for pain, transition to over the counter medications  * non weight bearing  * reassess 2 weeks, repeat xrays left wrist, sooner if needed.      Jayce Ricks M.D., M.S.  Dept. of Orthopaedic Surgery  [facility]

## 2020-06-08 NOTE — LETTER
[facility] [place]  [address]  [address]  [place] [address]  [phone]      WORKABILITY    [facility], Dr. Jayce Ricks M.D., YANNA Maria, Michele Felizy        6/8/2020      RE: Eli Burton    [address]  [facility] [address]        To whom it may concern:     Eli Burton is under my professional care for   1. Other closed extra-articular fracture of distal end of left radius, initial encounter        Date of injury: 6/2/20.      Ms. Burton can return to work WITH RESTRICTIONS: No use of left upper extremity other than typing. Must be in brace    Next appointment: 2 weeks        Abdias Friedman PA-C, CAQ (Ortho)  Supervising Physician: Jayce Ricks M.D., M.S.  Dept. of Orthopaedic Surgery  [facility]

## 2020-06-08 NOTE — LETTER
6/8/2020         RE: Eli Burton  [address]  [facility] [address]        Dear Colleague,    Thank you for referring your patient, Eli Burton, to the [facility] [place]. Please see a copy of my visit note below.    Chief Complaint:   Chief Complaint   Patient presents with     Left Wrist - Pain     Left wrist injury. DOI: 6/2/20. 6 days. FOOSH. In a bulky sugartong orthoglass splint. Right hand dominant. Denies prior left upper extremity injuries.        HPI: Eli Burton is a 55 year old female , right -hand dominant, who presents for evaluation and management of a left wrist injury. She injured her hand on the evening of 6/2/2020 while falling, [place] outstretched left upper extremity. Onset of pain, swelling, decreased range of motion. She presented to the emergency room the next day with xrays showing a nondisplaced left distal radius fracture, splinted. She was given oxycodone but didn't like it.    It has been 6 days since the initial injury.      She reports having moderate  pain/discomfort around the injury site. She denies associated numbness or tingling but has numbness and tingling due to carpal tunnel syndrome. She denies any other injuries to her upper extremity.   Symptoms: pain, swelling.  Location of symptoms: left wrist.  Pain severity: 6/10  Pain quality: aching  Frequency of symptoms: are constant  Aggravating factors: movement.  Relieving factors: rest, splint.    Previous treatment: splint, pain medications (roxicodone).    Past medical history:  has a past medical history of Anxiety, CTS (carpal tunnel syndrome) (7/20/2014), Depression, Headache, and Hyperlipidemia LDL goal <130.   Patient Active Problem List    Diagnosis Date Noted     Obesity (BMI 35.0-39.9) with comorbidity (H) 02/08/2019     Priority: Medium     History of hepatitis C 03/29/2017     Priority: Medium      Family history of colon cancer 03/21/2017     Priority: Medium     S/P parathyroidectomy 12/15/2016     Priority: Medium     Hyperlipidemia LDL goal <130      Priority: Medium     Trigger finger, acquired 08/09/2014     Priority: Medium     Problem list name updated by automated process. Provider to review       CTS (carpal tunnel syndrome) 07/20/2014     Priority: Medium     Tobacco abuse 06/10/2014     Priority: Medium     Anxiety 06/10/2014     Priority: Medium       Past surgical history:  has a past surgical history that includes Parathyroidectomy (N/A, 12/23/2014).     Medications:    Current Outpatient Medications   Medication Sig Dispense Refill     Fluticasone Propionate (FLONASE NA)        oxyCODONE (ROXICODONE) 5 MG tablet Take 1 tablet (5 mg) by mouth every 6 hours as needed for moderate to severe pain 10 tablet 0     sertraline (ZOLOFT) 100 MG tablet TAKE ONE TABLET BY MOUTH ONCE DAILY (NEED TO BE SEEN IN CLINIC FOR FURTHER REFILLS) 90 tablet 1     trimethoprim-polymyxin b (POLYTRIM) 38734-9.1 UNIT/ML-% ophthalmic solution Place 1-2 drops Into the left eye every 4 hours 1 Bottle 0        Allergies:   No Known Allergies     Family History: family history includes Breast Cancer in her maternal grandmother; Cancer - colorectal in her maternal grandfather; Heart Surgery in her sister.     Social History: dental .  reports that she has been smoking cigarettes. She has been smoking about 0.50 packs per day. She has never used smokeless tobacco. She reports current alcohol use. She reports current drug use. Drug: Marijuana.    Review of Systems:  ROS: 10 point ROS neg other than the symptoms noted above in the HPI and past medical history.    Physical Exam  GENERAL APPEARANCE: healthy, alert, no distress.   SKIN: no suspicious lesions or rashes  NEURO: Normal strength and tone, mentation intact and speech normal  PSYCH:  mentation appears normal and affect normal. Not anxious.  RESPIRATORY: No  "increased work of breathing.    /82   Ht 1.721 m (5' 7.75\")   Wt 109.3 kg (241 lb)   BMI 36.91 kg/m       left WRIST/HAND EXAM:    The splint was removed    Skin intact. Mild volar ecchymosis.  Normal wear pattern, color and tone.    There is mild swelling in the left wrist and hand.  There is moderate tenderness in the distal radius of the wrist.  There is mild ecchymosis.  There is no erythema of the surrounding skin.  There is no maceration of the skin.  There is no deformity in the area.  Able to make a full fist without much discomfort.    Intact sensation light touch median, radial, ulnar nerves of the hand  Intact sensation to the radial and ulnar digital nerves of the fingers, as well as the finger tips.  Intact epl fpl fdp edc wrist flexion/extension biceps/triceps deltoid  Brisk capillary refill to all fingers.   Palpable radial pulse, 2+.    X-rays:  3 views left wrist from 6/3/2020 were reviewed in clinic today. On my review, Nondisplaced transverse oblique fracture distal radial metaphysis. No evidence of scaphoid fracture. Degenerative changes at the base of the thumb.    .    Assessment: 54yo RHD female with nondisplaced, left distal radius fracture, appears extra-articular.    Plan:  *   * reviewed xrays with patient. Based on the xrays, the fracture is minimally/mildly displaced and in acceptable alignment. As long as fracture maintains alignment, nonoperative treatment can be pursued. However, if fracture displaces, then the fracture is unstable, and would recommend open-reduction and internal fixation. I did discuss that we would need to monitor this fracture on a weekly basis for the next couple of weeks, such that if fracture does displace, we can catch it sooner rather than later, and proceed with treatment at that time. Patient does understand.   * will immobilize in wrist brace today, to be worn day and night  * elevation for swelling  * finger range of motion.  * norco for pain, " transition to over the counter medications  * non weight bearing  * reassess 2 weeks, repeat xrays left wrist, sooner if needed.      Jayce Ricks M.D., M.S.  Dept. of Orthopaedic Surgery  [facility]      Again, thank you for allowing me to participate in the care of your patient.        Sincerely,        Jayce Ricks MD

## 2020-06-11 ENCOUNTER — TELEPHONE (OUTPATIENT)
Dept: FAMILY MEDICINE | Facility: CLINIC | Age: 55
End: 2020-06-11

## 2020-06-11 NOTE — TELEPHONE ENCOUNTER
Patient Quality Outreach      Summary:    Patient is due/failing the following:   Breast Cancer Screening - Mammogram    Type of outreach:    Sent letter.    Questions for provider review:    None                                                                                   **Start Working phrase here:**       Patient has the following on her problem list/HM: None                                                Domenica ELIZALDE RN

## 2020-06-22 ENCOUNTER — ANCILLARY PROCEDURE (OUTPATIENT)
Dept: GENERAL RADIOLOGY | Facility: CLINIC | Age: 55
End: 2020-06-22
Attending: ORTHOPAEDIC SURGERY
Payer: COMMERCIAL

## 2020-06-22 ENCOUNTER — OFFICE VISIT (OUTPATIENT)
Dept: ORTHOPEDICS | Facility: CLINIC | Age: 55
End: 2020-06-22
Payer: COMMERCIAL

## 2020-06-22 VITALS
SYSTOLIC BLOOD PRESSURE: 139 MMHG | BODY MASS INDEX: 37.33 KG/M2 | DIASTOLIC BLOOD PRESSURE: 85 MMHG | HEART RATE: 85 BPM | WEIGHT: 246.3 LBS | HEIGHT: 68 IN

## 2020-06-22 DIAGNOSIS — S52.552A OTHER CLOSED EXTRA-ARTICULAR FRACTURE OF DISTAL END OF LEFT RADIUS, INITIAL ENCOUNTER: ICD-10-CM

## 2020-06-22 DIAGNOSIS — S52.552A OTHER CLOSED EXTRA-ARTICULAR FRACTURE OF DISTAL END OF LEFT RADIUS, INITIAL ENCOUNTER: Primary | ICD-10-CM

## 2020-06-22 PROCEDURE — 73110 X-RAY EXAM OF WRIST: CPT | Mod: LT

## 2020-06-22 PROCEDURE — 99207 ZZC FRACTURE CARE IN GLOBAL PERIOD: CPT | Performed by: ORTHOPAEDIC SURGERY

## 2020-06-22 ASSESSMENT — PAIN SCALES - GENERAL: PAINLEVEL: MILD PAIN (2)

## 2020-06-22 ASSESSMENT — MIFFLIN-ST. JEOR: SCORE: 1751.74

## 2020-06-22 NOTE — PROGRESS NOTES
"Chief Complaint:   Chief Complaint   Patient presents with     Follow Up     L distal radius fracture; DOI 6/2/20. Improvement in pain since last office visit. She notes \"twinges\" of pain with certain movements and intermittent pain into the radial side of the forearm. She has taken the brace off to shower. She still has some bruising over the volar wrist. She notes the swelling has improved.      INJURY: left distal radius fracture  DATE of INJURY: 6/2/2020.      HPI: Eli Burton is a 55 year old female , right -hand dominant, who presents for evaluation and management of a left wrist injury. Returns today doing better, pain and swelling improved. Occasional \"twinge\" with certain movements. Wearing wrist brace other than hygiene. Still has some swelling, bruising. It has been 3 weeks since the initial injury. She does catch herself trying to use it at times and then will be sore.    Recall: She injured her hand on the evening of 6/2/2020 while falling, [place] on outstretched left upper extremity. Onset of pain, swelling, decreased range of motion. She presented to the emergency room the next day with xrays showing a nondisplaced left distal radius fracture, splinted. She was given oxycodone but didn't like it.    It has been 3 weeks since the initial injury.      She reports having mild   pain/discomfort around the injury site. She denies associated numbness or tingling but has numbness and tingling due to carpal tunnel syndrome. She denies any other injuries to her upper extremity.   Symptoms: pain, swelling.  Location of symptoms: left wrist.  Pain severity: 2/10  Pain quality: aching  Frequency of symptoms: are constant  Aggravating factors: movement.  Relieving factors: rest, splint.    Previous treatment: splint, pain medications (roxicodone).    Past medical history:  has a past medical history of Anxiety, CTS (carpal tunnel syndrome) " (7/20/2014), Depression, Headache, and Hyperlipidemia LDL goal <130.   Patient Active Problem List    Diagnosis Date Noted     Obesity (BMI 35.0-39.9) with comorbidity (H) 02/08/2019     Priority: Medium     History of hepatitis C 03/29/2017     Priority: Medium     Family history of colon cancer 03/21/2017     Priority: Medium     S/P parathyroidectomy 12/15/2016     Priority: Medium     Hyperlipidemia LDL goal <130      Priority: Medium     Trigger finger, acquired 08/09/2014     Priority: Medium     Problem list name updated by automated process. Provider to review       CTS (carpal tunnel syndrome) 07/20/2014     Priority: Medium     Tobacco abuse 06/10/2014     Priority: Medium     Anxiety 06/10/2014     Priority: Medium       Past surgical history:  has a past surgical history that includes Parathyroidectomy (N/A, 12/23/2014).     Medications:    Current Outpatient Medications   Medication Sig Dispense Refill     Fluticasone Propionate (FLONASE NA)        oxyCODONE (ROXICODONE) 5 MG tablet Take 1 tablet (5 mg) by mouth every 6 hours as needed for moderate to severe pain 10 tablet 0     sertraline (ZOLOFT) 100 MG tablet TAKE ONE TABLET BY MOUTH ONCE DAILY (NEED TO BE SEEN IN CLINIC FOR FURTHER REFILLS) 90 tablet 1     trimethoprim-polymyxin b (POLYTRIM) 42079-5.1 UNIT/ML-% ophthalmic solution Place 1-2 drops Into the left eye every 4 hours 1 Bottle 0        Allergies:   No Known Allergies     Family History: family history includes Breast Cancer in her maternal grandmother; Cancer - colorectal in her maternal grandfather; Heart Surgery in her sister.     Social History: dental .  reports that she has been smoking cigarettes. She has been smoking about 0.50 packs per day. She has never used smokeless tobacco. She reports current alcohol use. She reports current drug use. Drug: Marijuana.    Review of Systems:     Denies numbness, tingling, parasthesias.   Denies headaches.   Denies fevers, chills,  "night sweats   Denies chest pain.   Denies shortness of breath.   Denies any skin problems, abrasions, rashes, irritation.      Physical Exam  GENERAL APPEARANCE: healthy, alert, no distress.   SKIN: no suspicious lesions or rashes  NEURO: Normal strength and tone, mentation intact and speech normal  PSYCH:  mentation appears normal and affect normal. Not anxious.  RESPIRATORY: No increased work of breathing.    /85   Pulse 85   Ht 1.721 m (5' 7.75\")   Wt 111.7 kg (246 lb 4.8 oz)   BMI 37.73 kg/m       left WRIST/HAND EXAM:    The brace was removed    Skin intact. Mild volar, linear patch of resolving ecchymosis.  Normal wear pattern, color and tone.    There is mild swelling in the left wrist and hand.  There is mild tenderness in the distal radius of the wrist.  There is mild resolving ecchymosis.  There is no erythema of the surrounding skin.  There is no maceration of the skin.  There is no deformity in the area.  Able to make a full fist without much discomfort.    Intact sensation light touch median, radial, ulnar nerves of the hand  Intact sensation to the radial and ulnar digital nerves of the fingers, as well as the finger tips.  Intact epl fpl fdp edc wrist flexion/extension biceps/triceps deltoid  Brisk capillary refill to all fingers.   Palpable radial pulse, 2+.    X-rays:  3 views left wrist from 6/22/2020 were reviewed in clinic today. On my review, stable alignment of a nondisplaced transverse oblique fracture distal radial metaphysis. Slight decreased fracture lucency. No evidence of scaphoid fracture. Degenerative changes at the base of the thumb.    .    Assessment: 54yo RHD female with nondisplaced, left distal radius fracture, appears extra-articular.    Plan:  *   * reviewed xrays with patient. Based on the xrays, the fracture is in stable and acceptable alignment to previous.   * early healing, not complete.  * continue wrist brace.  * elevation for swelling  * finger range of " motion.  * over the counter pain control.  * non weight bearing  * reassess 4 weeks, repeat xrays left wrist, sooner if needed.      Jayce Ricks M.D., M.S.  Dept. of Orthopaedic Surgery  [facility]

## 2020-06-22 NOTE — LETTER
"    6/22/2020         RE: Eli Burton  [address]  [facility] [address]        Dear Colleague,    Thank you for referring your patient, Eli Burton, to the [facility] [place]. Please see a copy of my visit note below.    Chief Complaint:   Chief Complaint   Patient presents with     Follow Up     L distal radius fracture; DOI 6/2/20. Improvement in pain since last office visit. She notes \"twinges\" of pain with certain movements and intermittent pain into the radial side of the forearm. She has taken the brace off to shower. She still has some bruising over the volar wrist. She notes the swelling has improved.      INJURY: left distal radius fracture  DATE of INJURY: 6/2/2020.      HPI: Eli Burton is a 55 year old female , right -hand dominant, who presents for evaluation and management of a left wrist injury. Returns today doing better, pain and swelling improved. Occasional \"twinge\" with certain movements. Wearing wrist brace other than hygiene. Still has some swelling, bruising. It has been 3 weeks since the initial injury. She does catch herself trying to use it at times and then will be sore.    Recall: She injured her hand on the evening of 6/2/2020 while falling, [place] outstretched left upper extremity. Onset of pain, swelling, decreased range of motion. She presented to the emergency room the next day with xrays showing a nondisplaced left distal radius fracture, splinted. She was given oxycodone but didn't like it.    It has been 3 weeks since the initial injury.      She reports having mild   pain/discomfort around the injury site. She denies associated numbness or tingling but has numbness and tingling due to carpal tunnel syndrome. She denies any other injuries to her upper extremity.   Symptoms: pain, swelling.  Location of symptoms: left wrist.  Pain severity: 2/10  Pain quality: " aching  Frequency of symptoms: are constant  Aggravating factors: movement.  Relieving factors: rest, splint.    Previous treatment: splint, pain medications (roxicodone).    Past medical history:  has a past medical history of Anxiety, CTS (carpal tunnel syndrome) (7/20/2014), Depression, Headache, and Hyperlipidemia LDL goal <130.   Patient Active Problem List    Diagnosis Date Noted     Obesity (BMI 35.0-39.9) with comorbidity (H) 02/08/2019     Priority: Medium     History of hepatitis C 03/29/2017     Priority: Medium     Family history of colon cancer 03/21/2017     Priority: Medium     S/P parathyroidectomy 12/15/2016     Priority: Medium     Hyperlipidemia LDL goal <130      Priority: Medium     Trigger finger, acquired 08/09/2014     Priority: Medium     Problem list name updated by automated process. Provider to review       CTS (carpal tunnel syndrome) 07/20/2014     Priority: Medium     Tobacco abuse 06/10/2014     Priority: Medium     Anxiety 06/10/2014     Priority: Medium       Past surgical history:  has a past surgical history that includes Parathyroidectomy (N/A, 12/23/2014).     Medications:    Current Outpatient Medications   Medication Sig Dispense Refill     Fluticasone Propionate (FLONASE NA)        oxyCODONE (ROXICODONE) 5 MG tablet Take 1 tablet (5 mg) by mouth every 6 hours as needed for moderate to severe pain 10 tablet 0     sertraline (ZOLOFT) 100 MG tablet TAKE ONE TABLET BY MOUTH ONCE DAILY (NEED TO BE SEEN IN CLINIC FOR FURTHER REFILLS) 90 tablet 1     trimethoprim-polymyxin b (POLYTRIM) 77640-5.1 UNIT/ML-% ophthalmic solution Place 1-2 drops Into the left eye every 4 hours 1 Bottle 0        Allergies:   No Known Allergies     Family History: family history includes Breast Cancer in her maternal grandmother; Cancer - colorectal in her maternal grandfather; Heart Surgery in her sister.     Social History: dental .  reports that she has been smoking cigarettes. She has  "been smoking about 0.50 packs per day. She has never used smokeless tobacco. She reports current alcohol use. She reports current drug use. Drug: Marijuana.    Review of Systems:     Denies numbness, tingling, parasthesias.   Denies headaches.   Denies fevers, chills, night sweats   Denies chest pain.   Denies shortness of breath.   Denies any skin problems, abrasions, rashes, irritation.      Physical Exam  GENERAL APPEARANCE: healthy, alert, no distress.   SKIN: no suspicious lesions or rashes  NEURO: Normal strength and tone, mentation intact and speech normal  PSYCH:  mentation appears normal and affect normal. Not anxious.  RESPIRATORY: No increased work of breathing.    /85   Pulse 85   Ht 1.721 m (5' 7.75\")   Wt 111.7 kg (246 lb 4.8 oz)   BMI 37.73 kg/m       left WRIST/HAND EXAM:    The brace was removed    Skin intact. Mild volar, linear patch of resolving ecchymosis.  Normal wear pattern, color and tone.    There is mild swelling in the left wrist and hand.  There is mild tenderness in the distal radius of the wrist.  There is mild resolving ecchymosis.  There is no erythema of the surrounding skin.  There is no maceration of the skin.  There is no deformity in the area.  Able to make a full fist without much discomfort.    Intact sensation light touch median, radial, ulnar nerves of the hand  Intact sensation to the radial and ulnar digital nerves of the fingers, as well as the finger tips.  Intact epl fpl fdp edc wrist flexion/extension biceps/triceps deltoid  Brisk capillary refill to all fingers.   Palpable radial pulse, 2+.    X-rays:  3 views left wrist from 6/22/2020 were reviewed in clinic today. On my review, stable alignment of a nondisplaced transverse oblique fracture distal radial metaphysis. Slight decreased fracture lucency. No evidence of scaphoid fracture. Degenerative changes at the base of the thumb.    .    Assessment: 56yo RHD female with nondisplaced, left distal radius " fracture, appears extra-articular.    Plan:  *   * reviewed xrays with patient. Based on the xrays, the fracture is in stable and acceptable alignment to previous.   * early healing, not complete.  * continue wrist brace.  * elevation for swelling  * finger range of motion.  * over the counter pain control.  * non weight bearing  * reassess 4 weeks, repeat xrays left wrist, sooner if needed.      Jayce Ricks M.D., M.S.  Dept. of Orthopaedic Surgery  [facility]      Again, thank you for allowing me to participate in the care of your patient.        Sincerely,        Jayce Ricks MD

## 2020-11-05 ENCOUNTER — TELEPHONE (OUTPATIENT)
Dept: FAMILY MEDICINE | Facility: CLINIC | Age: 55
End: 2020-11-05

## 2020-11-05 NOTE — TELEPHONE ENCOUNTER
Panel Management Review      Composite cancer screening  Chart review shows that this patient is due/due soon for the following Mammogram and Colonoscopy  Summary:    Patient is due/failing the following:   COLONOSCOPY, MAMMOGRAM and PHYSICAL    Action needed:   Reach out to patient     Type of outreach:    letter was sent on 06/2020, send another letter    Questions for provider review:    None                                                                                                                                    Kelly Thurston (Lori) CNP        Chart routed to Care Team .(Yolanda)

## 2020-11-24 DIAGNOSIS — F41.9 ANXIETY: ICD-10-CM

## 2020-11-25 RX ORDER — SERTRALINE HYDROCHLORIDE 100 MG/1
TABLET, FILM COATED ORAL
Start: 2020-11-25

## 2020-11-25 NOTE — TELEPHONE ENCOUNTER
Sertraline:    Too soon  Rx sent 6/8/2020 for #90 with 1 refill    Due for physical.  Jazmine Maldonado RN

## 2020-12-10 ENCOUNTER — OFFICE VISIT (OUTPATIENT)
Dept: FAMILY MEDICINE | Facility: CLINIC | Age: 55
End: 2020-12-10
Payer: COMMERCIAL

## 2020-12-10 ENCOUNTER — ANCILLARY PROCEDURE (OUTPATIENT)
Dept: GENERAL RADIOLOGY | Facility: CLINIC | Age: 55
End: 2020-12-10
Attending: FAMILY MEDICINE
Payer: COMMERCIAL

## 2020-12-10 VITALS
DIASTOLIC BLOOD PRESSURE: 92 MMHG | BODY MASS INDEX: 36.73 KG/M2 | HEART RATE: 96 BPM | OXYGEN SATURATION: 97 % | WEIGHT: 234 LBS | SYSTOLIC BLOOD PRESSURE: 159 MMHG | RESPIRATION RATE: 14 BRPM | HEIGHT: 67 IN

## 2020-12-10 DIAGNOSIS — S92.355A CLOSED NONDISPLACED FRACTURE OF FIFTH METATARSAL BONE OF LEFT FOOT, INITIAL ENCOUNTER: ICD-10-CM

## 2020-12-10 DIAGNOSIS — S99.922A FOOT INJURY, LEFT, INITIAL ENCOUNTER: Primary | ICD-10-CM

## 2020-12-10 DIAGNOSIS — S99.922A FOOT INJURY, LEFT, INITIAL ENCOUNTER: ICD-10-CM

## 2020-12-10 DIAGNOSIS — Z20.822 EXPOSURE TO COVID-19 VIRUS: ICD-10-CM

## 2020-12-10 PROCEDURE — U0003 INFECTIOUS AGENT DETECTION BY NUCLEIC ACID (DNA OR RNA); SEVERE ACUTE RESPIRATORY SYNDROME CORONAVIRUS 2 (SARS-COV-2) (CORONAVIRUS DISEASE [COVID-19]), AMPLIFIED PROBE TECHNIQUE, MAKING USE OF HIGH THROUGHPUT TECHNOLOGIES AS DESCRIBED BY CMS-2020-01-R: HCPCS | Performed by: FAMILY MEDICINE

## 2020-12-10 PROCEDURE — 99214 OFFICE O/P EST MOD 30 MIN: CPT | Performed by: FAMILY MEDICINE

## 2020-12-10 PROCEDURE — 73610 X-RAY EXAM OF ANKLE: CPT | Mod: LT | Performed by: RADIOLOGY

## 2020-12-10 PROCEDURE — 73630 X-RAY EXAM OF FOOT: CPT | Mod: LT | Performed by: RADIOLOGY

## 2020-12-10 RX ORDER — HYDROCODONE BITARTRATE AND ACETAMINOPHEN 5; 325 MG/1; MG/1
1 TABLET ORAL 2 TIMES DAILY PRN
Qty: 18 TABLET | Refills: 0 | Status: SHIPPED | OUTPATIENT
Start: 2020-12-10 | End: 2020-12-19

## 2020-12-10 ASSESSMENT — MIFFLIN-ST. JEOR: SCORE: 1684.05

## 2020-12-10 ASSESSMENT — PAIN SCALES - GENERAL: PAINLEVEL: WORST PAIN (10)

## 2020-12-10 NOTE — PROGRESS NOTES
"Subjective     Eli Burton is a 55 year old female who presents to clinic today for pain in her ankle after a fall yesterday. She was leaving work and heading to her car, twisted her ankle and fell. She reported increased pain while bearing weight on the left ankle. She applied ice and took OTC tylenol PM and reported some relief. She denied any fever, shortness of breath or chest pain. She also reported some exposure to Covid19 from  who tested positive. Was able to hobble into her car immediately following but then not able to walk much this morning  Pain is significant    HPI         Musculoskeletal problem/pain  Onset/Duration: 1 day  Description  Location: foot - left  Joint Swelling: YES-  Left lateral ankle and lateral foot  Redness: no  Pain: YES-    Warmth: no  Intensity:  severe  Progression of Symptoms:  worsening  Accompanying signs and symptoms:   Fevers: no  Numbness/tingling/weakness: YES  History  Trauma to the area: YES  Recent illness:  no  Previous similar problem: no  Previous evaluation:  no  Precipitating or alleviating factors:  Aggravating factors include: walking/standing  Therapies tried and outcome: ice helps numb it but it hurts so bad       Review of Systems   CONSTITUTIONAL:NEGATIVE for fever, chills, change in weight  INTEGUMENTARY/SKIN: NEGATIVE for worrisome rashes, moles or lesions  EYES: NEGATIVE for vision changes or irritation  ENT/MOUTH: NEGATIVE for ear, mouth and throat problems  RESP:NEGATIVE for significant cough or SOB  CV: NEGATIVE for chest pain, palpitations or peripheral edema  MUSCULOSKELETAL: NEGATIVE for significant arthralgias or myalgia and injury to left ankle.  NEURO: NEGATIVE for weakness, dizziness or paresthesias      Objective    BP (!) 159/92 (BP Location: Left arm, Patient Position: Sitting, Cuff Size: Adult Large)   Pulse 96   Resp 14   Ht 1.702 m (5' 7\")   Wt 106.1 kg (234 lb)   SpO2 97%   BMI 36.65 kg/m      Physical Exam " "  GENERAL: healthy, alert and no distress  HEENT: tearful due to pain, some nasal drainage - clear.  Swabbed for COvid 19  NECK: no adenopathy, no asymmetry, masses, or scars and thyroid normal to palpation  RESP: lungs clear to auscultation - no rales, rhonchi or wheezes  CV: regular rate and rhythm, normal S1 S2, no S3 or S4, no murmur, click or rub, no peripheral edema and peripheral pulses strong  ABDOMEN: soft, nontender, no hepatosplenomegaly, no masses and bowel sounds normal  MS: no gross musculoskeletal defects noted, no edema    No results found for this or any previous visit (from the past 24 hour(s)).  Xray - Ordered.        Assessment & Plan   Problem List Items Addressed This Visit     None      Visit Diagnoses     Foot injury, left, initial encounter    -  Primary    Relevant Orders    XR Foot Left G/E 3 Views    XR Ankle Left G/E 3 Views             Tobacco Cessation:   reports that she has been smoking cigarettes. She has been smoking about 0.50 packs per day. She has never used smokeless tobacco.  Tobacco Cessation Action Plan: Not addressed this visit.      BMI:   Estimated body mass index is 36.65 kg/m  as calculated from the following:    Height as of this encounter: 1.702 m (5' 7\").    Weight as of this encounter: 106.1 kg (234 lb).   Weight management plan: Discussed healthy diet and exercise guidelines    Assessment/Plan:    (A89.863D) Foot injury, left, initial encounter  (primary encounter diagnosis)  Comment: Patient is a 55 year old female who presents to clinic today for pain in her ankle after a fall. She was leaving work and heading to her car, twisted her ankle and fell. She reported increased pain while bearing weight on the left ankle.    Plan: XR Foot Left G/E 3 Views, XR Ankle Left G/E 3 Views   reviewed by me and noted to have a small avulsion fracture at the proximal 5th metatarsal bone    Exposure to covid  Swabbed and will contact for results  Encouraged to quarantine  No work " until she is cleared    FUTURE APPOINTMENTS:    Referred to ortho for casting  Offered crutches but she declined - concerned about cost.  Might go to [place] for this.  Her  will pick her up  Letter excusing her from work offered    Needs pap and recheck on BP see me in 1-2 weeks       - Follow-up in 1 -2 weeks for annual visit or as needed.    ALLYSON BONNER, Student NP.    The medical student has acted as my scribe.  I have completed all components of the history, physical exam and assessment and plan and agree with the note as documented.   Serena Treviño MD  [facility]

## 2020-12-10 NOTE — PATIENT INSTRUCTIONS
Take Ibuprofen 800 mg with food up to 3 times per day    Can add tylenol 1000 mg inbetween doses    Ice the ankle for 15 minutes 3 times per day  Rest and elevated ankle  Avoid walking  Compress the ankle with the air cast we provided    See us in 1-2 weeks for a follow up visit and to recheck blood pressure

## 2020-12-10 NOTE — LETTER
December 14, 2020      Eli Burton  [address]  [facility] [address]        Dear ,    We are writing to inform you of your test results.    Your COVID-19 test was negative.    Resulted Orders   Symptomatic COVID-19 Virus (Coronavirus) by PCR   Result Value Ref Range    COVID-19 Virus PCR to [facility] - Source Nasopharyngeal     COVID-19 Virus PCR to [facility] - Result Not Detected       Comment:      Collection of multiple specimens from the same patient may be necessary to   detect the virus. The possibility of a false negative should be considered if   the patient's recent exposure or clinical presentation suggests 2019 nCOV   infection and diagnostic tests for other causes of illness are negative.   Repeat testing may be considered in this setting.  Patient sample was heat inactivated and amplified using the HDPCR SARS-CoV-2   assay ([employer] The HDPCRTM SARS-CoV-2 assay is a reverse   transcription real-time polymerase chain reaction (qRT-PCR) test intended for   the qualitative detection of nucleic acid  from SARS-CoV-2 in human nasopharyngeal swabs, oropharyngeal swabs, anterior   nasal swabs, mid-turbinate nasal swabs as well as nasal aspirate, nasal wash,   and bronchoalveolar lavage (BAL) specimens from individuals who are suspected   of COVID-19 by their healthcare provider.  A negative result does not rule out the presence of real-time PCR inhibitors   in the sp ecimen or COVID-19 RNA in concentrations below the limit of detection   of the assay. The possibility of a false negative should be considered if the   patients recent exposure or clinical presentation suggests COVID-19.   Additional testing or repeat testing requires consultation with the   laboratory.  Nasopharyngeal specimen is the preferred choice for swab-based SARS CoV2   testing. When collection of a nasopharyngeal swab is not possible the   following are acceptable alternatives:  an oropharyngeal (OP) specimen  collected by a healthcare professional, or a   nasal mid-turbinate (NMT) swab collected by a healthcare professional or by   onsite self-collection (using a flocked tapered swab), or an anterior nares   specimen collected by a healthcare professional or by onsite self-collection   (using a round foam swab). (Centers for Disease Control)  Testing performed by [facility] ([facility]) [address] [facility] [address]  The test performance characteristics were determined by [facility]. It has not been   cleared or approved by the FDA.  The laboratory is regulated under the Clinical Laboratory Improvement   Amendments of 1988 (CLIA-88) as qualified to perform high-complexity testing.   This test is used for clinical purposes. It should not be regarded as   investigational or for research.         If you have any questions or concerns, please call the clinic at the number listed above.       Sincerely,      Serena Treviño MD

## 2020-12-10 NOTE — LETTER
December 10, 2020      Eli Burton  [address] [facility] [address]              To Whom it May Concern,    Please excuse this patient from missing school or work.  They were seen for an acute visit and should  return to work once feeling better.           Sincerely,      Serena Treviño MD

## 2020-12-11 LAB
SARS-COV-2 RNA SPEC QL NAA+PROBE: NOT DETECTED
SPECIMEN SOURCE: NORMAL

## 2020-12-12 ENCOUNTER — OFFICE VISIT (OUTPATIENT)
Dept: ORTHOPEDICS | Facility: CLINIC | Age: 55
End: 2020-12-12
Payer: COMMERCIAL

## 2020-12-12 VITALS
DIASTOLIC BLOOD PRESSURE: 84 MMHG | SYSTOLIC BLOOD PRESSURE: 142 MMHG | HEIGHT: 67 IN | BODY MASS INDEX: 36.73 KG/M2 | WEIGHT: 234 LBS

## 2020-12-12 DIAGNOSIS — S99.922A FOOT INJURY, LEFT, INITIAL ENCOUNTER: ICD-10-CM

## 2020-12-12 DIAGNOSIS — S92.352A CLOSED FRACTURE OF BASE OF FIFTH METATARSAL BONE OF LEFT FOOT, INITIAL ENCOUNTER: Primary | ICD-10-CM

## 2020-12-12 PROCEDURE — 99243 OFF/OP CNSLTJ NEW/EST LOW 30: CPT | Performed by: PEDIATRICS

## 2020-12-12 ASSESSMENT — MIFFLIN-ST. JEOR: SCORE: 1684.05

## 2020-12-12 NOTE — PROGRESS NOTES
Sports Medicine Clinic Visit    PCP: Wei Ybarra    Eli Burton is a 55 year old female who is seen  in consultation at the request of  Serena Treviño M.D. presenting with left foot injury.  She stepped off of a curb and inverted her foot 12/9/20.  She was seen at primary care the following day and x rays were taken.  She is currently using a cane to assist with ambulation.   She is not currently wearing any protection for her foot.       Injury: inversion foot injury     Location of Pain: left lateral foot   Duration of Pain: 3 day(s)  Rating of Pain at worst: 14/10  Rating of Pain Currently: 7/10  Symptoms are better with: Ice and Ibuprofen  Symptoms are worse with: full weight bearing   Additional Features:   Positive: swelling and bruising   Negative: popping, grinding, catching, locking, instability, paresthesias, numbness, weakness, pain in other joints and systemic symptoms  Other evaluation and/or treatments so far consists of: x rays   Prior History of related problems: denies     Social History: dental  -- mostly seated    Review of Systems  Musculoskeletal: as above  Remainder of review of systems is negative including constitutional, CV, pulmonary, GI, Skin and Neurologic except as noted in HPI or medical history.    Past Medical History:   Diagnosis Date     Anxiety      CTS (carpal tunnel syndrome) 7/20/2014     Depression      Headache      Hyperlipidemia LDL goal <130      Past Surgical History:   Procedure Laterality Date     PARATHYROIDECTOMY N/A 12/23/2014    Procedure: PARATHYROIDECTOMY;  Surgeon: Anusha hCan MD;  Location:  OR     Family History   Problem Relation Age of Onset     Breast Cancer Maternal Grandmother      Cancer - colorectal Maternal Grandfather      Heart Surgery Sister         heart attack     Cerebrovascular Disease No family hx of      Social History     Socioeconomic History     Marital status:      Spouse name: Not  "on file     Number of children: 0     Years of education: Not on file     Highest education level: Not on file   Occupational History     Occupation: Dental       Employer: [employer]   Social Needs     Financial resource strain: Not on file     Food insecurity     Worry: Not on file     Inability: Not on file     Transportation needs     Medical: Not on file     Non-medical: Not on file   Tobacco Use     Smoking status: Current Every Day Smoker     Packs/day: 0.50     Types: Cigarettes     Smokeless tobacco: Never Used   Substance and Sexual Activity     Alcohol use: Yes     Alcohol/week: 0.0 standard drinks     Comment: 0-10 light beers weekly     Drug use: Yes     Types: Marijuana     Sexual activity: Not Currently     Partners: Male   Lifestyle     Physical activity     Days per week: Not on file     Minutes per session: Not on file     Stress: Not on file   Relationships     Social connections     Talks on phone: Not on file     Gets together: Not on file     Attends Protestant service: Not on file     Active member of club or organization: Not on file     Attends meetings of clubs or organizations: Not on file     Relationship status: Not on file     Intimate partner violence     Fear of current or ex partner: Not on file     Emotionally abused: Not on file     Physically abused: Not on file     Forced sexual activity: Not on file   Other Topics Concern     Parent/sibling w/ CABG, MI or angioplasty before 65F 55M? Not Asked   Social History Narrative     Not on file       Objective  BP (!) 142/84   Ht 1.702 m (5' 7\")   Wt 106.1 kg (234 lb)   BMI 36.65 kg/m      GENERAL APPEARANCE: healthy, alert and no distress   GAIT: antalgic and cane  SKIN: no suspicious lesions or rashes  NEURO: Normal strength and tone, mentation intact and speech normal  PSYCH:  mentation appears normal and affect normal/bright  HEENT: no scleral icterus  CV: distal perfusion intact  RESP: nonlabored " breathing      Left Foot exam    ROM:   Mild limitation ankle motion, some lateral foot discomfort  Toe motion intact    Strength:   deferred     Tender:  Base of 5th metatarsal    Non-Tender:       remainder of foot and ankle    Inspection:        no deformity noted    Skin:       positive (+) bruising lateral ankle, midfoot, forefoot, to base of toes       positive (+) swelling lateral ankle, foot       well perfused       capillary refill brisk     Sensation grossly intact to light touch  Regional pulses normal      Radiology:  Visualized radiographs as noted below, and reviewed the images with the patient; if the report was available at the time of the visit, the report was reviewed as well.  Nondisplaced 5th MT base fracture.    Results for orders placed or performed in visit on 12/10/20   XR Ankle Left G/E 3 Views    Narrative    LEFT FOOT THREE OR MORE VIEWS, LEFT ANKLE THREE OR MORE VIEWS  12/10/2020 9:02 AM     HISTORY:  Left foot injury with pain at ankle and lateral foot. Foot  injury, left, initial encounter.    FINDINGS: Calcaneal spurring.      Impression    IMPRESSION:  1. Fracture at the fifth metatarsal base without significant  displacement.  2. Tiny age-indeterminate avulsion fracture at the inferior tip of the  medial malleolus.    EDWIGE MEJIA MD       Assessment:  1. Closed fracture of base of fifth metatarsal bone of left foot, initial encounter    2. Foot injury, left, initial encounter        Plan:  Discussed the assessment with the patient.  Discussed nature of injury, fracture healing.  Topical Treatments: Ice prn  Over the counter medication: prn  X-ray of the area reviewed.  Activity Modification: discussed  Work Restrictions letter provided  Medical Equipment: discussed support options; cam walker provided. WB ok in boot if comfortable. May remove for hygiene and when at rest. Ambulatory aid prn  Follow up: 4 weeks, repeat foot x-ray, sooner prn.  Questions answered. Discussed signs and  symptoms that may indicate more serious issues; the patient was instructed to seek appropriate care if noted. Manny indicates understanding of these issues and agrees with the plan.      Jeb Acosta, DO, CAQ      CC: Serena Treviño          Patient Instructions   Manny to follow up with Primary Care provider regarding elevated blood pressure.    Ok to use ice, compression, elevation and OTC pain medication as needed  Use of protection for foot, such as CAM boot   Ok to take foot out of CAM boot for icing, elevation and hygiene  Recheck in clinic for repeat x rays in about 4 weeks      If you have any further questions for your physician or physician s care team you can call [phone] and use option 3 to leave a voice message. Calls received during business hours will be returned same day.        This note consists of symbols derived from keyboarding, dictation and/or voice recognition software. As a result, there may be errors in the script that have gone undetected. Please consider this when interpreting information found in this chart.

## 2020-12-12 NOTE — LETTER
12/12/2020         RE: Eli Burton  [address]  [facility] [address]        Dear Colleague,    Thank you for referring your patient, Eli Burton, to the [facility] [place]. Please see a copy of my visit note below.    Sports Medicine Clinic Visit    PCP: Wei Ybarra    Eli Burton is a 55 year old female who is seen  in consultation at the request of  Serena Treviño M.D. presenting with left foot injury.  She stepped off of a curb and inverted her foot 12/9/20.  She was seen at primary care the following day and x rays were taken.  She is currently using a cane to assist with ambulation.   She is not currently wearing any protection for her foot.       Injury: inversion foot injury     Location of Pain: left lateral foot   Duration of Pain: 3 day(s)  Rating of Pain at worst: 14/10  Rating of Pain Currently: 7/10  Symptoms are better with: Ice and Ibuprofen  Symptoms are worse with: full weight bearing   Additional Features:   Positive: swelling and bruising   Negative: popping, grinding, catching, locking, instability, paresthesias, numbness, weakness, pain in other joints and systemic symptoms  Other evaluation and/or treatments so far consists of: x rays   Prior History of related problems: denies     Social History: dental  -- mostly seated    Review of Systems  Musculoskeletal: as above  Remainder of review of systems is negative including constitutional, CV, pulmonary, GI, Skin and Neurologic except as noted in HPI or medical history.    Past Medical History:   Diagnosis Date     Anxiety      CTS (carpal tunnel syndrome) 7/20/2014     Depression      Headache      Hyperlipidemia LDL goal <130      Past Surgical History:   Procedure Laterality Date     PARATHYROIDECTOMY N/A 12/23/2014    Procedure: PARATHYROIDECTOMY;  Surgeon: Anusha Chan MD;  [facility]     Family History   Problem Relation Age of  "Onset     Breast Cancer Maternal Grandmother      Cancer - colorectal Maternal Grandfather      Heart Surgery Sister         heart attack     Cerebrovascular Disease No family hx of      Social History     Socioeconomic History     Marital status:      Spouse name: Not on file     Number of children: 0     Years of education: Not on file     Highest education level: Not on file   Occupational History     Occupation: Dental       Employer: [employer]   Social Needs     Financial resource strain: Not on file     Food insecurity     Worry: Not on file     Inability: Not on file     Transportation needs     Medical: Not on file     Non-medical: Not on file   Tobacco Use     Smoking status: Current Every Day Smoker     Packs/day: 0.50     Types: Cigarettes     Smokeless tobacco: Never Used   Substance and Sexual Activity     Alcohol use: Yes     Alcohol/week: 0.0 standard drinks     Comment: 0-10 light beers weekly     Drug use: Yes     Types: Marijuana     Sexual activity: Not Currently     Partners: Male   Lifestyle     Physical activity     Days per week: Not on file     Minutes per session: Not on file     Stress: Not on file   Relationships     Social connections     Talks on phone: Not on file     Gets together: Not on file     Attends Jainism service: Not on file     Active member of club or organization: Not on file     Attends meetings of clubs or organizations: Not on file     Relationship status: Not on file     Intimate partner violence     Fear of current or ex partner: Not on file     Emotionally abused: Not on file     Physically abused: Not on file     Forced sexual activity: Not on file   Other Topics Concern     Parent/sibling w/ CABG, MI or angioplasty before 65F 55M? Not Asked   Social History Narrative     Not on file       Objective  BP (!) 142/84   Ht 1.702 m (5' 7\")   Wt 106.1 kg (234 lb)   BMI 36.65 kg/m      GENERAL APPEARANCE: healthy, alert and no distress "   GAIT: antalgic and cane  SKIN: no suspicious lesions or rashes  NEURO: Normal strength and tone, mentation intact and speech normal  PSYCH:  mentation appears normal and affect normal/bright  HEENT: no scleral icterus  CV: distal perfusion intact  RESP: nonlabored breathing      Left Foot exam    ROM:   Mild limitation ankle motion, some lateral foot discomfort  Toe motion intact    Strength:   deferred     Tender:  Base of 5th metatarsal    Non-Tender:       remainder of foot and ankle    Inspection:        no deformity noted    Skin:       positive (+) bruising lateral ankle, midfoot, forefoot, to base of toes       positive (+) swelling lateral ankle, foot       well perfused       capillary refill brisk     Sensation grossly intact to light touch  Regional pulses normal      Radiology:  Visualized radiographs as noted below, and reviewed the images with the patient; if the report was available at the time of the visit, the report was reviewed as well.  Nondisplaced 5th MT base fracture.    Results for orders placed or performed in visit on 12/10/20   XR Ankle Left G/E 3 Views    Narrative    LEFT FOOT THREE OR MORE VIEWS, LEFT ANKLE THREE OR MORE VIEWS  12/10/2020 9:02 AM     HISTORY:  Left foot injury with pain at ankle and lateral foot. Foot  injury, left, initial encounter.    FINDINGS: Calcaneal spurring.      Impression    IMPRESSION:  1. Fracture at the fifth metatarsal base without significant  displacement.  2. Tiny age-indeterminate avulsion fracture at the inferior tip of the  medial malleolus.    EDWIGE MEJIA MD       Assessment:  1. Closed fracture of base of fifth metatarsal bone of left foot, initial encounter    2. Foot injury, left, initial encounter        Plan:  Discussed the assessment with the patient.  Discussed nature of injury, fracture healing.  Topical Treatments: Ice prn  Over the counter medication: prn  X-ray of the area reviewed.  Activity Modification: discussed  Work Restrictions  letter provided  Medical Equipment: discussed support options; cam walker provided. WB ok in boot if comfortable. May remove for hygiene and when at rest. Ambulatory aid prn  Follow up: 4 weeks, repeat foot x-ray, sooner prn.  Questions answered. Discussed signs and symptoms that may indicate more serious issues; the patient was instructed to seek appropriate care if noted. Manny indicates understanding of these issues and agrees with the plan.      Jeb Acosta DO, CAQ      CC: Serena Treviño          Patient Instructions   Manny to follow up with Primary Care provider regarding elevated blood pressure.    Ok to use ice, compression, elevation and OTC pain medication as needed  Use of protection for foot, such as CAM boot   Ok to take foot out of CAM boot for icing, elevation and hygiene  Recheck in clinic for repeat x rays in about 4 weeks      If you have any further questions for your physician or physician s care team you can call [phone] and use option 3 to leave a voice message. Calls received during business hours will be returned same day.        This note consists of symbols derived from keyboarding, dictation and/or voice recognition software. As a result, there may be errors in the script that have gone undetected. Please consider this when interpreting information found in this chart.          Again, thank you for allowing me to participate in the care of your patient.        Sincerely,        Jeb Acosta DO

## 2020-12-12 NOTE — LETTER
[facility] [place]  [address]  [place] [address]  Phone: [phone]  Fax: [phone]      December 12, 2020      RE: Eli Burton  [address]  [facility] [address]        To whom it may concern:    Eli Burton was seen in clinic today. The employee is UNABLE to return to work until Monday, 12/14/20.    When the patient returns to work, the following restrictions apply until recheck:  Kneel/Squat: Not at all (0 hours)  Seated work full time  Walk/Stand: limited; avoid walking/standing except for short periods of time (e.g., 2-3 minutes), or getting into or out of work environment.    Plan recheck in approximately 4 weeks.      Sincerely,              Jeb WEBBER.

## 2021-01-11 ENCOUNTER — ANCILLARY PROCEDURE (OUTPATIENT)
Dept: GENERAL RADIOLOGY | Facility: CLINIC | Age: 56
End: 2021-01-11
Attending: PEDIATRICS
Payer: COMMERCIAL

## 2021-01-11 ENCOUNTER — OFFICE VISIT (OUTPATIENT)
Dept: ORTHOPEDICS | Facility: CLINIC | Age: 56
End: 2021-01-11
Payer: COMMERCIAL

## 2021-01-11 VITALS
WEIGHT: 235 LBS | HEIGHT: 67 IN | BODY MASS INDEX: 36.88 KG/M2 | DIASTOLIC BLOOD PRESSURE: 84 MMHG | SYSTOLIC BLOOD PRESSURE: 138 MMHG

## 2021-01-11 DIAGNOSIS — S92.352A CLOSED FRACTURE OF BASE OF FIFTH METATARSAL BONE OF LEFT FOOT: Primary | ICD-10-CM

## 2021-01-11 DIAGNOSIS — S92.352A CLOSED FRACTURE OF BASE OF FIFTH METATARSAL BONE OF LEFT FOOT: ICD-10-CM

## 2021-01-11 PROCEDURE — 99213 OFFICE O/P EST LOW 20 MIN: CPT | Performed by: PEDIATRICS

## 2021-01-11 PROCEDURE — 73630 X-RAY EXAM OF FOOT: CPT | Mod: LT | Performed by: RADIOLOGY

## 2021-01-11 ASSESSMENT — MIFFLIN-ST. JEOR: SCORE: 1688.58

## 2021-01-11 NOTE — LETTER
1/11/2021         RE: Eli Burton  [address]  [facility] [address]        Dear Colleague,    Thank you for referring your patient, Eli Burton, to the [facility]. Please see a copy of my visit note below.    Sports Medicine Clinic Visit    PCP: Wei Ybarra    Eli Burton is a 55 year old female who is seen in f/u up for Closed fracture of base of fifth metatarsal bone of left foot. 12/9/20 Now 4.5 weeks  out from injury.  Since last visit on 12/12/2020 patient denies swelling, pain or paresthesias and repeat radiograph taken prior to seeing the patient.  Has continued with the use of the CAM boot.   **  Has done a few short trips around house without boot, no issues.      Review of Systems  All other systems reviewed and are negative unless noted above.    Past Medical History:   Diagnosis Date     Anxiety      CTS (carpal tunnel syndrome) 7/20/2014     Depression      Headache      Hyperlipidemia LDL goal <130      Past Surgical History:   Procedure Laterality Date     PARATHYROIDECTOMY N/A 12/23/2014    Procedure: PARATHYROIDECTOMY;  Surgeon: Anusha Chan MD;  Location:  OR     Family History   Problem Relation Age of Onset     Breast Cancer Maternal Grandmother      Cancer - colorectal Maternal Grandfather      Heart Surgery Sister         heart attack     Cerebrovascular Disease No family hx of      Social History     Socioeconomic History     Marital status:      Spouse name: Not on file     Number of children: 0     Years of education: Not on file     Highest education level: Not on file   Occupational History     Occupation: Dental       Employer: [employer]   Social Needs     Financial resource strain: Not on file     Food insecurity     Worry: Not on file     Inability: Not on file     Transportation needs     Medical: Not on file     Non-medical: Not on file   Tobacco Use     Smoking  "status: Current Every Day Smoker     Packs/day: 0.50     Types: Cigarettes     Smokeless tobacco: Never Used   Substance and Sexual Activity     Alcohol use: Yes     Alcohol/week: 0.0 standard drinks     Comment: 0-10 light beers weekly     Drug use: Yes     Types: Marijuana     Sexual activity: Not Currently     Partners: Male   Lifestyle     Physical activity     Days per week: Not on file     Minutes per session: Not on file     Stress: Not on file   Relationships     Social connections     Talks on phone: Not on file     Gets together: Not on file     Attends Alevism service: Not on file     Active member of club or organization: Not on file     Attends meetings of clubs or organizations: Not on file     Relationship status: Not on file     Intimate partner violence     Fear of current or ex partner: Not on file     Emotionally abused: Not on file     Physically abused: Not on file     Forced sexual activity: Not on file   Other Topics Concern     Parent/sibling w/ CABG, MI or angioplasty before 65F 55M? Not Asked   Social History Narrative     Not on file         Objective  /84   Ht 1.702 m (5' 7\")   Wt 106.6 kg (235 lb)   BMI 36.81 kg/m      GENERAL APPEARANCE: healthy, alert and no distress   GAIT: CAM boot   SKIN: no suspicious lesions or rashes  NEURO: Normal strength and tone, mentation intact and speech normal  PSYCH:  mentation appears normal and affect normal/bright  HEENT: no scleral icterus  CV: distal perfusion intact  RESP: nonlabored breathing    Exam:  Left foot:  No swelling or ecchymosis.  Some posterior ankle stiffness with AROM, no pain at fracture site.  Mildly tender fracture site.  Regional pulses normal.  Capillary refill less than 2 seconds.  Normal sensation noted.       Radiology  Visualized radiographs as noted below, and reviewed the images with the patient; if the report was available at the time of the visit, the report was reviewed as well.  5th MT base fracture, not " significantly changed.      Recent Results (from the past 24 hour(s))   XR Foot Left G/E 3 Views    Narrative    XR FOOT LT G/E 3 VW 1/11/2021 11:10 AM     HISTORY: Closed fracture of base of fifth metatarsal bone of left foot      Impression    IMPRESSION: Fifth metatarsal base fracture, alignment and appearance  unchanged from 12/10/2020.    CARROLL GAN MD         Assessment:  1. Closed fracture of base of fifth metatarsal bone of left foot        Plan:  Discussed the assessment with the patient.  Clinically improving; awaiting more radiographic change.  Continue with boot. May do simple home exercises for motion; her posterior ankle tightness appears related to boot use.  See patient instructions.  Follow up: 2-3 weeks, repeat x-ray foot, sooner prn.  Questions answered. Discussed signs and symptoms that may indicate more serious issues; the patient was instructed to seek appropriate care if noted. Manny indicates understanding of these issues and agrees with the plan.      Jeb Acosta, DO, CAQ            Patient Instructions   May start to reduce use of the boot at home, when at rest and if pain free  Otherwise, continue with routine use of the cam walker, particularly when out of the house  May do some simple motion exercises when out of the boot, provided remaining pain free  Recheck in 2-3 weeks, for repeat x-ray; hopefully at next visit will be able to advance activities more    If you have any further questions for your physician or physician s care team you can call [phone] and use option 3 to leave a voice message. Calls received during business hours will be returned same day.        This note consists of symbols derived from keyboarding, dictation and/or voice recognition software. As a result, there may be errors in the script that have gone undetected. Please consider this when interpreting information found in this chart.          Again, thank you for allowing me to participate in the  care of your patient.        Sincerely,        Jeb Acosta, DO

## 2021-01-11 NOTE — PATIENT INSTRUCTIONS
May start to reduce use of the boot at home, when at rest and if pain free  Otherwise, continue with routine use of the cam walker, particularly when out of the house  May do some simple motion exercises when out of the boot, provided remaining pain free  Recheck in 2-3 weeks, for repeat x-ray; hopefully at next visit will be able to advance activities more    If you have any further questions for your physician or physician s care team you can call [phone] and use option 3 to leave a voice message. Calls received during business hours will be returned same day.

## 2021-01-11 NOTE — PROGRESS NOTES
Sports Medicine Clinic Visit    PCP: Wei Ybarra    Eli Burton is a 55 year old female who is seen in f/u up for Closed fracture of base of fifth metatarsal bone of left foot. 12/9/20 Now 4.5 weeks  out from injury.  Since last visit on 12/12/2020 patient denies swelling, pain or paresthesias and repeat radiograph taken prior to seeing the patient.  Has continued with the use of the CAM boot.   **  Has done a few short trips around house without boot, no issues.      Review of Systems  All other systems reviewed and are negative unless noted above.    Past Medical History:   Diagnosis Date     Anxiety      CTS (carpal tunnel syndrome) 7/20/2014     Depression      Headache      Hyperlipidemia LDL goal <130      Past Surgical History:   Procedure Laterality Date     PARATHYROIDECTOMY N/A 12/23/2014    Procedure: PARATHYROIDECTOMY;  Surgeon: Anusha Chan MD;  Location:  OR     Family History   Problem Relation Age of Onset     Breast Cancer Maternal Grandmother      Cancer - colorectal Maternal Grandfather      Heart Surgery Sister         heart attack     Cerebrovascular Disease No family hx of      Social History     Socioeconomic History     Marital status:      Spouse name: Not on file     Number of children: 0     Years of education: Not on file     Highest education level: Not on file   Occupational History     Occupation: Dental       Employer: [employer]   Social Needs     Financial resource strain: Not on file     Food insecurity     Worry: Not on file     Inability: Not on file     Transportation needs     Medical: Not on file     Non-medical: Not on file   Tobacco Use     Smoking status: Current Every Day Smoker     Packs/day: 0.50     Types: Cigarettes     Smokeless tobacco: Never Used   Substance and Sexual Activity     Alcohol use: Yes     Alcohol/week: 0.0 standard drinks     Comment: 0-10 light beers weekly     Drug use: Yes     Types:  "Marijuana     Sexual activity: Not Currently     Partners: Male   Lifestyle     Physical activity     Days per week: Not on file     Minutes per session: Not on file     Stress: Not on file   Relationships     Social connections     Talks on phone: Not on file     Gets together: Not on file     Attends [employer] service: Not on file     Active member of club or organization: Not on file     Attends meetings of clubs or organizations: Not on file     Relationship status: Not on file     Intimate partner violence     Fear of current or ex partner: Not on file     Emotionally abused: Not on file     Physically abused: Not on file     Forced sexual activity: Not on file   Other Topics Concern     Parent/sibling w/ CABG, MI or angioplasty before 65F 55M? Not Asked   Social History Narrative     Not on file         Objective  /84   Ht 1.702 m (5' 7\")   Wt 106.6 kg (235 lb)   BMI 36.81 kg/m      GENERAL APPEARANCE: healthy, alert and no distress   GAIT: CAM boot   SKIN: no suspicious lesions or rashes  NEURO: Normal strength and tone, mentation intact and speech normal  PSYCH:  mentation appears normal and affect normal/bright  HEENT: no scleral icterus  CV: distal perfusion intact  RESP: nonlabored breathing    Exam:  Left foot:  No swelling or ecchymosis.  Some posterior ankle stiffness with AROM, no pain at fracture site.  Mildly tender fracture site.  Regional pulses normal.  Capillary refill less than 2 seconds.  Normal sensation noted.       Radiology  Visualized radiographs as noted below, and reviewed the images with the patient; if the report was available at the time of the visit, the report was reviewed as well.  5th MT base fracture, not significantly changed.      Recent Results (from the past 24 hour(s))   XR Foot Left G/E 3 Views    Narrative    XR FOOT LT G/E 3 VW 1/11/2021 11:10 AM     HISTORY: Closed fracture of base of fifth metatarsal bone of left foot      Impression    IMPRESSION: Fifth " metatarsal base fracture, alignment and appearance  unchanged from 12/10/2020.    CARROLL GAN MD         Assessment:  1. Closed fracture of base of fifth metatarsal bone of left foot        Plan:  Discussed the assessment with the patient.  Clinically improving; awaiting more radiographic change.  Continue with boot. May do simple home exercises for motion; her posterior ankle tightness appears related to boot use.  See patient instructions.  Follow up: 2-3 weeks, repeat x-ray foot, sooner prn.  Questions answered. Discussed signs and symptoms that may indicate more serious issues; the patient was instructed to seek appropriate care if noted. Manny indicates understanding of these issues and agrees with the plan.      Jeb Acosta DO, CAQ            Patient Instructions   May start to reduce use of the boot at home, when at rest and if pain free  Otherwise, continue with routine use of the cam walker, particularly when out of the house  May do some simple motion exercises when out of the boot, provided remaining pain free  Recheck in 2-3 weeks, for repeat x-ray; hopefully at next visit will be able to advance activities more    If you have any further questions for your physician or physician s care team you can call [phone] and use option 3 to leave a voice message. Calls received during business hours will be returned same day.        This note consists of symbols derived from keyboarding, dictation and/or voice recognition software. As a result, there may be errors in the script that have gone undetected. Please consider this when interpreting information found in this chart.

## 2021-02-17 ENCOUNTER — TELEPHONE (OUTPATIENT)
Dept: FAMILY MEDICINE | Facility: CLINIC | Age: 56
End: 2021-02-17

## 2021-02-17 NOTE — LETTER
February 17, 2021      Eli Burton  [address]  [facility] [address]        Gee Bryant,    We care about your health and have reviewed your health plan and are making recommendations based on this review to optimize your health.    You are in particular need of attention regarding:   Breast Cancer Screening    At this time Scott Ybarra asked that we reach out and provide you information to schedule your yearly mammogram.     Screening Mammogram Scheduling:  [facility] [phone]  [facility] [phone]  [facility] [phone]  Central Scheduling for All Locations [phone]    If you are underinsured or uninsured, we recommend you contact [facility].   They offer mammograms on a sliding fee charge.   You can schedule with them at [phone].    Sincerely,  Domenica ELIZALDE RN

## 2021-02-17 NOTE — TELEPHONE ENCOUNTER
Patient Quality Outreach      Summary:    Patient has the following on her problem list/HM: None    Patient is due/failing the following:   Breast Cancer Screening - Mammogram    Type of outreach:    Sent letter.    Questions for provider review:    None                                                                                     Domenica ELIZALDE RN

## 2021-09-02 ENCOUNTER — OFFICE VISIT (OUTPATIENT)
Dept: URGENT CARE | Facility: URGENT CARE | Age: 56
End: 2021-09-02
Payer: COMMERCIAL

## 2021-09-02 ENCOUNTER — OFFICE VISIT (OUTPATIENT)
Dept: FAMILY MEDICINE | Facility: CLINIC | Age: 56
End: 2021-09-02
Payer: COMMERCIAL

## 2021-09-02 VITALS
OXYGEN SATURATION: 97 % | HEART RATE: 92 BPM | SYSTOLIC BLOOD PRESSURE: 122 MMHG | TEMPERATURE: 97.9 F | BODY MASS INDEX: 37.18 KG/M2 | WEIGHT: 237.4 LBS | DIASTOLIC BLOOD PRESSURE: 80 MMHG

## 2021-09-02 VITALS
SYSTOLIC BLOOD PRESSURE: 144 MMHG | DIASTOLIC BLOOD PRESSURE: 85 MMHG | OXYGEN SATURATION: 97 % | HEART RATE: 86 BPM | TEMPERATURE: 98.7 F | RESPIRATION RATE: 19 BRPM

## 2021-09-02 DIAGNOSIS — L03.011 INFECTION OF NAIL BED OF FINGER OF RIGHT HAND: Primary | ICD-10-CM

## 2021-09-02 DIAGNOSIS — L03.011 PARONYCHIA OF FINGER OF RIGHT HAND: Primary | ICD-10-CM

## 2021-09-02 PROCEDURE — 10060 I&D ABSCESS SIMPLE/SINGLE: CPT | Performed by: PHYSICIAN ASSISTANT

## 2021-09-02 PROCEDURE — 99207 PR NON-BILLABLE SERV PER CHARTING: CPT | Performed by: FAMILY MEDICINE

## 2021-09-02 ASSESSMENT — ENCOUNTER SYMPTOMS
RESPIRATORY NEGATIVE: 1
DIARRHEA: 0
ARTHRALGIAS: 0
EYES NEGATIVE: 1
NAUSEA: 0
FEVER: 0
LIGHT-HEADEDNESS: 0
SHORTNESS OF BREATH: 0
ALLERGIC/IMMUNOLOGIC NEGATIVE: 1
ENDOCRINE NEGATIVE: 1
RHINORRHEA: 0
NECK PAIN: 0
CHILLS: 0
CARDIOVASCULAR NEGATIVE: 1
JOINT SWELLING: 0
DIZZINESS: 0
COUGH: 0
WEAKNESS: 0
NECK STIFFNESS: 0
HEADACHES: 0
PALPITATIONS: 0
WOUND: 1
MYALGIAS: 0
SORE THROAT: 0
BACK PAIN: 0
MUSCULOSKELETAL NEGATIVE: 1
VOMITING: 0

## 2021-09-02 NOTE — PROGRESS NOTES
Chief Complaint:    Chief Complaint   Patient presents with     Finger     R middle finger infection for 1 week.         Medical Decision Making:    Vital signs reviewed by Bird Tanner PA-C  BP (!) 144/85   Pulse 86   Temp 98.7  F (37.1  C)   Resp 19   SpO2 97%     Differential Diagnosis:  Paronychia, cellulitis     ASSESSMENT:     1. Paronychia of finger of right hand           PLAN:     Finger was cleaned with alcohol, and 18 gauge needle was used to aspirate white/yellow purulent material. Patient tolerated this procedure well.  Rx for Augmentin sent in.  Start finger soaks 2 times per day for next week.  Patient instructed to follow up with PCP in 1 week if symptoms are not improving.  Sooner if symptoms worsen.  Worrisome symptoms discussed with instructions to go to the ED.  Patient verbalized understanding and agreed with this plan.    Labs:     No results found for any visits on 09/02/21.    Current Meds:    Current Outpatient Medications:      amoxicillin-clavulanate (AUGMENTIN) 875-125 MG tablet, Take 1 tablet by mouth 2 times daily for 7 days, Disp: 14 tablet, Rfl: 0     Fluticasone Propionate (FLONASE NA), , Disp: , Rfl:      oxyCODONE (ROXICODONE) 5 MG tablet, Take 1 tablet (5 mg) by mouth every 6 hours as needed for moderate to severe pain (Patient not taking: Reported on 12/10/2020), Disp: 10 tablet, Rfl: 0     sertraline (ZOLOFT) 100 MG tablet, TAKE ONE TABLET BY MOUTH ONCE DAILY (NEED TO BE SEEN IN CLINIC FOR FURTHER REFILLS), Disp: 90 tablet, Rfl: 1     trimethoprim-polymyxin b (POLYTRIM) 17387-7.1 UNIT/ML-% ophthalmic solution, Place 1-2 drops Into the left eye every 4 hours (Patient not taking: Reported on 9/2/2021), Disp: 1 Bottle, Rfl: 0    Allergies:  No Known Allergies    SUBJECTIVE    HPI: Eli Burton is an 56 year old female who presents for evaluation and treatment of possible finger infection.  Patient noticed symptoms 1 week ago.  She has had worsening redness, swelling  and pain of the distal R middle finger.  She denies any numbness, tingling, or dysfunction for the finger.      ROS:      Review of Systems   Constitutional: Negative for chills and fever.   HENT: Negative for congestion, ear pain, rhinorrhea and sore throat.    Eyes: Negative.    Respiratory: Negative.  Negative for cough and shortness of breath.    Cardiovascular: Negative.  Negative for chest pain and palpitations.   Gastrointestinal: Negative for diarrhea, nausea and vomiting.   Endocrine: Negative.    Genitourinary: Negative.    Musculoskeletal: Negative.  Negative for arthralgias, back pain, joint swelling, myalgias, neck pain and neck stiffness.   Skin: Positive for rash and wound.   Allergic/Immunologic: Negative.  Negative for immunocompromised state.   Neurological: Negative for dizziness, weakness, light-headedness and headaches.        Family History   Family History   Problem Relation Age of Onset     Breast Cancer Maternal Grandmother      Cancer - colorectal Maternal Grandfather      Heart Surgery Sister         heart attack     Cerebrovascular Disease No family hx of        Social History  Social History     Socioeconomic History     Marital status:      Spouse name: Not on file     Number of children: 0     Years of education: Not on file     Highest education level: Not on file   Occupational History     Occupation: Dental       Employer: [employer]   Tobacco Use     Smoking status: Current Every Day Smoker     Packs/day: 0.50     Types: Cigarettes     Smokeless tobacco: Never Used   Substance and Sexual Activity     Alcohol use: Yes     Alcohol/week: 0.0 standard drinks     Comment: 0-10 light beers weekly     Drug use: Yes     Types: Marijuana     Sexual activity: Not Currently     Partners: Male   Other Topics Concern     Parent/sibling w/ CABG, MI or angioplasty before 65F 55M? Not Asked   Social History Narrative     Not on file     Social Determinants of Health      Financial Resource Strain:      Difficulty of Paying Living Expenses:    Food Insecurity:      Worried About Running Out of Food in the Last Year:      Ran Out of Food in the Last Year:    Transportation Needs:      Lack of Transportation (Medical):      Lack of Transportation (Non-Medical):    Physical Activity:      Days of Exercise per Week:      Minutes of Exercise per Session:    Stress:      Feeling of Stress :    Social Connections:      Frequency of Communication with Friends and Family:      Frequency of Social Gatherings with Friends and Family:      Attends [employer] Services:      Active Member of Clubs or Organizations:      Attends Club or Organization Meetings:      Marital Status:    Intimate Partner Violence:      Fear of Current or Ex-Partner:      Emotionally Abused:      Physically Abused:      Sexually Abused:         Surgical History:  Past Surgical History:   Procedure Laterality Date     PARATHYROIDECTOMY N/A 12/23/2014    Procedure: PARATHYROIDECTOMY;  Surgeon: Anusha Chan MD;  Location:  OR        Problem List:  Patient Active Problem List   Diagnosis     Tobacco abuse     Anxiety     CTS (carpal tunnel syndrome)     Trigger finger, acquired     S/P parathyroidectomy     Hyperlipidemia LDL goal <130     Family history of colon cancer     History of hepatitis C     Obesity (BMI 35.0-39.9) with comorbidity (H)           OBJECTIVE:     Vital signs noted and reviewed by Bird Tanner PA-C  BP (!) 144/85   Pulse 86   Temp 98.7  F (37.1  C)   Resp 19   SpO2 97%      PEFR:    Physical Exam  Vitals and nursing note reviewed.   Constitutional:       General: She is not in acute distress.     Appearance: She is well-developed. She is not ill-appearing, toxic-appearing or diaphoretic.   HENT:      Head: Normocephalic and atraumatic.      Right Ear: Tympanic membrane and external ear normal. No drainage, swelling or tenderness. Tympanic membrane is not perforated, erythematous,  retracted or bulging.      Left Ear: Tympanic membrane and external ear normal. No drainage, swelling or tenderness. Tympanic membrane is not perforated, erythematous, retracted or bulging.      Nose: No mucosal edema, congestion or rhinorrhea.      Right Sinus: No maxillary sinus tenderness or frontal sinus tenderness.      Left Sinus: No maxillary sinus tenderness or frontal sinus tenderness.      Mouth/Throat:      Pharynx: No pharyngeal swelling, oropharyngeal exudate, posterior oropharyngeal erythema or uvula swelling.      Tonsils: No tonsillar abscesses.   Eyes:      Pupils: Pupils are equal, round, and reactive to light.   Neck:      Trachea: Trachea normal.   Cardiovascular:      Rate and Rhythm: Normal rate and regular rhythm.      Heart sounds: Normal heart sounds, S1 normal and S2 normal. No murmur heard.   No friction rub. No gallop.    Pulmonary:      Effort: Pulmonary effort is normal. No respiratory distress.      Breath sounds: Normal breath sounds. No decreased breath sounds, wheezing, rhonchi or rales.   Abdominal:      General: Bowel sounds are normal. There is no distension.      Palpations: Abdomen is soft. Abdomen is not rigid. There is no mass.      Tenderness: There is no abdominal tenderness. There is no guarding or rebound.   Musculoskeletal:      Right hand: Swelling and tenderness present. No deformity. Normal range of motion. Normal strength. Normal sensation. There is no disruption of two-point discrimination. Normal capillary refill. Normal pulse.      Cervical back: Full passive range of motion without pain, normal range of motion and neck supple.      Comments: Distal R middle finger is erythematous, swollen, with visible abscess.   Lymphadenopathy:      Cervical: No cervical adenopathy.   Skin:     General: Skin is warm and dry.   Neurological:      Mental Status: She is alert and oriented to person, place, and time.      Cranial Nerves: No cranial nerve deficit.      Deep Tendon  Reflexes: Reflexes are normal and symmetric.   Psychiatric:         Behavior: Behavior normal. Behavior is cooperative.         Thought Content: Thought content normal.         Judgment: Judgment normal.             Bird Tanner PA-C  9/2/2021, 1:38 PM

## 2021-09-02 NOTE — PROGRESS NOTES
Assessment & Plan       ICD-10-CM    1. Infection of nail bed of finger of right hand  L03.011      Recommend procedure at         Return in about 1 week (around 9/9/2021) for If symptoms persist.    Hugo Brower MD  [facility] JULIANA Bryant is a 56 year old who presents for the following health issues     HPI     Concern - Finger infection  Onset: 6 days  Description: Right hand middle finger swelling, redness and painful  Intensity: moderate, severe  Progression of Symptoms:  worsening  Accompanying Signs & Symptoms: Oozing in the beginning  Previous history of similar problem:   Precipitating factors:        Worsened by:   Alleviating factors:        Improved by:   Therapies tried and outcome:  none             Review of Systems    ROS: 10 point ROS neg other than the symptoms noted above in the HPI.        Objective    /80   Pulse 92   Temp 97.9  F (36.6  C) (Oral)   Wt 107.7 kg (237 lb 6.4 oz)   SpO2 97%   BMI 37.18 kg/m    Body mass index is 37.18 kg/m .  Physical Exam   GENERAL: healthy, alert and no distress  ABDOMEN: soft, nontender, no hepatosplenomegaly, no masses and bowel sounds normal  SKIN: abscess base nailbed right middle finger

## 2021-09-02 NOTE — PATIENT INSTRUCTIONS
Patient Education     Paronychia of the Finger or Toe  Paronychia is an infection near a fingernail or toenail. It usually occurs when an opening in the cuticle or an ingrown toenail lets bacteria under the skin.   The infection will need to be drained if pus is present. If the infection has been caught early, you may need only antibiotic treatment. Healing will take about 1 to 2 weeks.   Home care  Follow these guidelines when caring for yourself at home:     Clean and soak the toe or finger. Do this 2 times a day for the first 3 days. To do so:  ? Soak your foot or hand in a tub of warm water for 5 minutes. Or hold your toe or finger under a faucet of warm running water for 5 minutes.  ? Clean any crust away with soap and water using a cotton swab.  ? Put antibiotic ointment on the infected area.    Change the dressing daily or any time it gets dirty.    If you were given antibiotics, take them as directed until they are all gone.    If your infection is on a toe, wear comfortable shoes with a lot of toe room. You can also wear open-toed sandals while your toe heals.    You may use over-the-counter medicine (acetaminophen or ibuprofen) to help with pain, unless another medicine was prescribed. If you have chronic liver or kidney disease, talk with your healthcare provider before using these medicines. Also talk with your provider if you've had a stomach ulcer or gastrointestinal bleeding.  Prevention  The following can prevent paronychia:     Don't cut or play with your cuticles at home. A healthy cuticle maintains a seal between your skin and nail and keeps out infection.    Don't bite your nails.    Don't suck on your thumbs or fingers.  Follow-up care  Follow up with your healthcare provider, or as advised.   When to seek medical advice  Call your healthcare provider right away if any of these occur:     Redness, pain, or swelling of the finger or toe gets worse    You have trouble moving or bending the  finger or toe    Red streaks in the skin leading away from the wound    Pus or fluid draining from the nail area    Fever of 100.4 F (38 C) or higher, or as directed by your provider  Jason last reviewed this educational content on 7/1/2019 2000-2021 [employer]. All rights reserved. This information is not intended as a substitute for professional medical care. Always follow your healthcare professional's instructions.

## 2022-03-10 NOTE — PATIENT INSTRUCTIONS
Manny to follow up with Primary Care provider regarding elevated blood pressure.    Ok to use ice, compression, elevation and OTC pain medication as needed  Use of protection for foot, such as CAM boot   Ok to take foot out of CAM boot for icing, elevation and hygiene  Recheck in clinic for repeat x rays in about 4 weeks      If you have any further questions for your physician or physician s care team you can call [phone] and use option 3 to leave a voice message. Calls received during business hours will be returned same day.    
normal...

## 2022-03-31 NOTE — MR AVS SNAPSHOT
After Visit Summary   3/29/2017    Eli Burton    MRN: [id number]           Patient Information     Date Of Birth          1965        Visit Information        Provider Department      3/29/2017 9:30 AM Leeroy Bowers NP [facility]        Today's Diagnoses     Chronic hepatitis C without hepatic coma (H)    -  1      Care Instructions    Abdominal ultrasound and Fibroscan in the near future.    Leeroy Bowers is trying to add on labs as discussed. If unable, we will arrange for you to get these additional labs another time (when you come for the imaging studies).    Follow up appointment with Leeroy Bowers CNP in 1 month.    Abstain from alcohol as discussed.     Please ask your  to get screened for Hepatitis C.    If you have any questions or concerns about your last test results or plan of care, please call our clinic at [phone].             Follow-ups after your visit        Your next 10 appointments already scheduled     Apr 06, 2017  7:30 AM CDT   US ABDOMEN COMPLETE with UCUS2   [facility] ([facility])    [address] [address] [phone]           Please bring a list of your medicines (including vitamins, minerals and over-the-counter drugs). Also, tell your doctor about any allergies you may have. Wear comfortable clothes and leave your valuables at home.  Adults: No eating or drinking for 8 hours before the exam. You may take medicine with a small sip of water.  Children: - Children 6+ years: No food or drink for 6 hours before exam. - Children 1-5 years: No food or drink for 4 hours before exam. - Infants, breast-fed: may have breast milk up to 2 hours before exam. - Infants, formula: may have bottle until 4 hours before exam.  Please call the Imaging Department at your exam site with any questions.            Apr 06, 2017  9:30 AM CDT   (Arrive by 9:15 AM)   FIBROSIS SCAN  "with UC FIBROSIS SCAN   [facility] ([facility])    [address] [facility] [address] [phone]            Apr 27, 2017  3:30 PM CDT   (Arrive by 3:15 PM)   Return General Liver with Leeroy Bowers NP   [facility] ([facility])    [address]  [address] [facility] [id number] [phone]              Future tests that were ordered for you today     Open Future Orders        Priority Expected Expires Ordered    Fibrosis Scan (In-Clinic) Routine 3/29/2017 3/29/2018 3/29/2017    US abdomen complete Routine 6/27/2017 3/29/2018 3/29/2017            Who to contact     If you have questions or need follow up information about today's clinic visit or your schedule please contact [facility]PAT[facility] directly at [phone].  Normal or non-critical lab and imaging results will be communicated to you by MyChart, letter or phone within 4 business days after the clinic has received the results. If you do not hear from us within 7 days, please contact the clinic through MyChart or phone. If you have a critical or abnormal lab result, we will notify you by phone as soon as possible.  Submit refill requests through [employer] or call your pharmacy and they will forward the refill request to us. Please allow 3 business days for your refill to be completed.          Additional Information About Your Visit        Care EveryWhere ID     This is your Care EveryWhere ID. This could be used by other organizations to access your [place] medical records  [id number]        Your Vitals Were     Pulse Temperature Respirations Height Pulse Oximetry BMI (Body Mass Index)    77 98.3  F (36.8  C) (Oral) 18 1.725 m (5' 7.91\") 96% 36.59 kg/m2       Blood Pressure from Last 3 Encounters:   03/29/17 (!) 140/94   03/21/17 122/88   12/15/16 133/85    Weight from Last 3 Encounters:   03/29/17 108.9 kg (240 lb)   03/21/17 110.7 kg (244 lb) "   12/15/16 110.7 kg (244 lb)              We Performed the Following     Hepatitis A Antibody IgG     Hepatitis B core antibody     HIV Antigen Antibody Combo        Primary Care Provider Office Phone # Fax #    Maricarmen Stephens [phone]       [address] [address]        Thank you!     Thank you for choosing [facility]PAT[facility]  for your care. Our goal is always to provide you with excellent care. Hearing back from our patients is one way we can continue to improve our services. Please take a few minutes to complete the written survey that you may receive in the mail after your visit with us. Thank you!             Your Updated Medication List - Protect others around you: Learn how to safely use, store and throw away your medicines at www.disposemymeds.org.          This list is accurate as of: 3/29/17 10:41 AM.  Always use your most recent med list.                   Brand Name Dispense Instructions for use    sertraline 100 MG tablet    ZOLOFT    30 tablet    TAKE ONE TABLET BY MOUTH EVERY DAY -DUE FOR PHQ9 BEFORE FURTHER REFILLS          Topical Sulfur Applications Pregnancy And Lactation Text: This medication is Pregnancy Category C and has an unknown safety profile during pregnancy. It is unknown if this topical medication is excreted in breast milk.

## 2022-05-10 NOTE — PROGRESS NOTES
SUBJECTIVE:   CC: Eli Burton is an 56 year old woman who presents for preventive health visit.       Patient has been advised of split billing requirements and indicates understanding: Yes  Healthy Habits:     Getting at least 3 servings of Calcium per day:  NO    Bi-annual eye exam:  Yes    Dental care twice a year:  NO    Sleep apnea or symptoms of sleep apnea:  Excessive snoring    Diet:  Regular (no restrictions)    Frequency of exercise:  1 day/week    Duration of exercise:  15-30 minutes    Taking medications regularly:  Yes    PHQ-2 Total Score: 4    Additional concerns today:  Yes    Answers for HPI/ROS submitted by the patient on 5/23/2022  If you checked off any problems, how difficult have these problems made it for you to do your work, take care of things at home, or get along with other people?: Somewhat difficult  PHQ9 TOTAL SCORE: 11      She has been off of her zoloft for a few months, and would like to get back on.    Today's PHQ-2 Score:   PHQ-2 ( 1999 Pfizer) 5/23/2022   Q1: Little interest or pleasure in doing things 3   Q2: Feeling down, depressed or hopeless 1   PHQ-2 Score 4   Q1: Little interest or pleasure in doing things Nearly every day   Q2: Feeling down, depressed or hopeless Several days   PHQ-2 Score 4       Abuse: Current or Past (Physical, Sexual or Emotional) - No  Do you feel safe in your environment? Yes    Have you ever done Advance Care Planning? (For example, a Health Directive, POLST, or a discussion with a medical provider or your loved ones about your wishes): No, advance care planning information given to patient to review.  Patient declined advance care planning discussion at this time.    Social History     Tobacco Use     Smoking status: Current Every Day Smoker     Packs/day: 0.50     Types: Cigarettes     Smokeless tobacco: Never Used   Substance Use Topics     Alcohol use: Yes     Alcohol/week: 0.0 standard drinks     Comment: 0-10 light beers weekly     If  you drink alcohol do you typically have >3 drinks per day or >7 drinks per week? Yes      Alcohol Use 5/23/2022   Prescreen: >3 drinks/day or >7 drinks/week? Yes   Prescreen: >3 drinks/day or >7 drinks/week? -   AUDIT SCORE  8     AUDIT - Alcohol Use Disorders Identification Test - Reproduced from the World Health Organization Audit 2001 (Second Edition) 5/23/2022   1.  How often do you have a drink containing alcohol? 2 to 4 times a month   2.  How many drinks containing alcohol do you have on a typical day when you are drinking? 3 or 4   3.  How often do you have five or more drinks on one occasion? Monthly   4.  How often during the last year have you found that you were not able to stop drinking once you had started? Never   5.  How often during the last year have you failed to do what was normally expected of you because of drinking? Never   6.  How often during the last year have you needed a first drink in the morning to get yourself going after a heavy drinking session? Never   7.  How often during the last year have you had a feeling of guilt or remorse after drinking? Never   8.  How often during the last year have you been unable to remember what happened the night before because of your drinking? Less than monthly   9.  Have you or someone else been injured because of your drinking? No   10. Has a relative, friend, doctor or other health care worker been concerned about your drinking or suggested you cut down? Yes, but not in the last year   TOTAL SCORE 8       Reviewed orders with patient.  Reviewed health maintenance and updated orders accordingly - Yes  BP Readings from Last 3 Encounters:   05/23/22 132/82   09/02/21 (!) 144/85   09/02/21 122/80    Wt Readings from Last 3 Encounters:   05/23/22 109 kg (240 lb 4.8 oz)   09/02/21 107.7 kg (237 lb 6.4 oz)   01/11/21 106.6 kg (235 lb)                    Breast Cancer Screening:  Any new diagnosis of family breast, ovarian, or bowel cancer? No    FHS-7:    Breast CA Risk Assessment (FHS-7) 5/23/2022   Did any of your first-degree relatives have breast or ovarian cancer? Unknown   Did any of your relatives have bilateral breast cancer? Unknown   Did any man in your family have breast cancer? No   Did any woman in your family have breast and ovarian cancer? Yes   Did any woman in your family have breast cancer before age 50 y? Unknown   Do you have 2 or more relatives with breast and/or ovarian cancer? No   Do you have 2 or more relatives with breast and/or bowel cancer? Unknown       Mammogram Screening: Recommended mammography every 1-2 years with patient discussion and risk factor consideration  Pertinent mammograms are reviewed under the imaging tab.    History of abnormal Pap smear: NO - age 30-65 PAP every 5 years with negative HPV co-testing recommended  PAP / HPV Latest Ref Rng & Units 3/21/2017   PAP (Historical) - NIL   HPV16 NEG Negative   HPV18 NEG Negative   HRHPV NEG Negative     Reviewed and updated as needed this visit by clinical staff                    Reviewed and updated as needed this visit by Provider                       Review of Systems   Constitutional: Negative for chills and fever.   HENT: Positive for congestion. Negative for ear pain, hearing loss and sore throat.    Eyes: Negative for pain and visual disturbance.   Respiratory: Negative for cough and shortness of breath.    Cardiovascular: Negative for chest pain, palpitations and peripheral edema.   Gastrointestinal: Negative for abdominal pain, constipation, diarrhea, heartburn, hematochezia and nausea.   Breasts:  Negative for tenderness, breast mass and discharge.   Genitourinary: Negative for dysuria, frequency, genital sores, hematuria, pelvic pain, urgency, vaginal bleeding and vaginal discharge.   Musculoskeletal: Negative for arthralgias, joint swelling and myalgias.   Skin: Negative for rash.   Neurological: Negative for dizziness, weakness, headaches and paresthesias.  "  Psychiatric/Behavioral: Negative for mood changes. The patient is nervous/anxious.           OBJECTIVE:   /82   Pulse 87   Temp 98.5  F (36.9  C)   Ht 1.702 m (5' 7\")   Wt 109 kg (240 lb 4.8 oz)   SpO2 97%   BMI 37.64 kg/m    Physical Exam  GENERAL APPEARANCE: alert and no distress  EYES: Eyes grossly normal to inspection, PERRL and conjunctivae and sclerae normal  HENT: ear canals and TM's normal, nose and mouth without ulcers or lesions, oropharynx clear and oral mucous membranes moist  NECK: no adenopathy, no asymmetry, masses, or scars and thyroid normal to palpation  RESP: lungs clear to auscultation - no rales, rhonchi or wheezes  CV: regular rate and rhythm, normal S1 S2, no S3 or S4, no murmur, click or rub, no peripheral edema and peripheral pulses strong  ABDOMEN: soft, nontender, no hepatosplenomegaly, no masses and bowel sounds normal  MS: no musculoskeletal defects are noted and gait is age appropriate without ataxia  SKIN: no suspicious lesions or rashes  NEURO: Normal strength and tone, sensory exam grossly normal, mentation intact and speech normal  PSYCH: mentation appears normal and affect normal/bright    Diagnostic Test Results:  Labs reviewed in Epic    ASSESSMENT/PLAN:   (Z00.00) Routine general medical examination at a health care facility  (primary encounter diagnosis)  Comment:   Plan: CBC with platelets and differential,         Comprehensive metabolic panel (BMP + Alb, Alk         Phos, ALT, AST, Total. Bili, TP), TSH with free        T4 reflex            (F41.9) Anxiety  Comment: will get back on at 50 mg for 1 week, can taper up to 100 mg   Plan: sertraline (ZOLOFT) 100 MG tablet            (Z12.31) Visit for screening mammogram  Comment:   Plan: MA SCREENING DIGITAL BILAT - Future  (s+30)            (Z13.220) Screening for hyperlipidemia  Comment:   Plan: Lipid panel reflex to direct LDL Fasting, Lipid        panel reflex to direct LDL Fasting            (Z12.11) Screen " "for colon cancer  Comment:   Plan: COLOGUARD([employer])            (Z12.4) Cervical cancer screening  Comment: is due for pap, she does decline today  Plan:     (Z23) High priority for 2019-nCoV vaccine  Comment:   Plan: COVID-19,PF,PFIZER (12+ Yrs GRAY LABEL)                  COUNSELING:  Reviewed preventive health counseling, as reflected in patient instructions       Regular exercise       Healthy diet/nutrition       Colorectal Cancer Screening    Estimated body mass index is 37.64 kg/m  as calculated from the following:    Height as of this encounter: 1.702 m (5' 7\").    Weight as of this encounter: 109 kg (240 lb 4.8 oz).    Weight management plan: Discussed healthy diet and exercise guidelines    She reports that she has been smoking cigarettes. She has been smoking about 0.50 packs per day. She has never used smokeless tobacco.  Tobacco Cessation Action Plan:   Information offered: Patient not interested at this time      Counseling Resources:  ATP IV Guidelines  Pooled Cohorts Equation Calculator  Breast Cancer Risk Calculator  BRCA-Related Cancer Risk Assessment: FHS-7 Tool  FRAX Risk Assessment  ICSI Preventive Guidelines  Dietary Guidelines for Americans, 2010  USDA's MyPlate  ASA Prophylaxis  Lung CA Screening    Wei Ybarra PA-C  [facility]  "

## 2022-05-23 ENCOUNTER — OFFICE VISIT (OUTPATIENT)
Dept: FAMILY MEDICINE | Facility: CLINIC | Age: 57
End: 2022-05-23
Payer: COMMERCIAL

## 2022-05-23 VITALS
BODY MASS INDEX: 37.72 KG/M2 | HEART RATE: 87 BPM | HEIGHT: 67 IN | WEIGHT: 240.3 LBS | OXYGEN SATURATION: 97 % | SYSTOLIC BLOOD PRESSURE: 132 MMHG | DIASTOLIC BLOOD PRESSURE: 82 MMHG | TEMPERATURE: 98.5 F

## 2022-05-23 DIAGNOSIS — Z12.11 SCREEN FOR COLON CANCER: ICD-10-CM

## 2022-05-23 DIAGNOSIS — Z12.31 VISIT FOR SCREENING MAMMOGRAM: ICD-10-CM

## 2022-05-23 DIAGNOSIS — Z13.220 SCREENING FOR HYPERLIPIDEMIA: ICD-10-CM

## 2022-05-23 DIAGNOSIS — Z00.00 ROUTINE GENERAL MEDICAL EXAMINATION AT A HEALTH CARE FACILITY: Primary | ICD-10-CM

## 2022-05-23 DIAGNOSIS — F41.9 ANXIETY: ICD-10-CM

## 2022-05-23 DIAGNOSIS — Z23 HIGH PRIORITY FOR 2019-NCOV VACCINE: ICD-10-CM

## 2022-05-23 DIAGNOSIS — Z12.4 CERVICAL CANCER SCREENING: ICD-10-CM

## 2022-05-23 LAB
ALBUMIN SERPL-MCNC: 3.7 G/DL (ref 3.4–5)
ALP SERPL-CCNC: 74 U/L (ref 40–150)
ALT SERPL W P-5'-P-CCNC: 13 U/L (ref 0–50)
ANION GAP SERPL CALCULATED.3IONS-SCNC: 6 MMOL/L (ref 3–14)
AST SERPL W P-5'-P-CCNC: 8 U/L (ref 0–45)
BASOPHILS # BLD AUTO: 0.1 10E3/UL (ref 0–0.2)
BASOPHILS NFR BLD AUTO: 1 %
BILIRUB SERPL-MCNC: 0.4 MG/DL (ref 0.2–1.3)
BUN SERPL-MCNC: 14 MG/DL (ref 7–30)
CALCIUM SERPL-MCNC: 8.5 MG/DL (ref 8.5–10.1)
CHLORIDE BLD-SCNC: 108 MMOL/L (ref 94–109)
CHOLEST SERPL-MCNC: 217 MG/DL
CO2 SERPL-SCNC: 26 MMOL/L (ref 20–32)
CREAT SERPL-MCNC: 0.65 MG/DL (ref 0.52–1.04)
EOSINOPHIL # BLD AUTO: 0.1 10E3/UL (ref 0–0.7)
EOSINOPHIL NFR BLD AUTO: 2 %
ERYTHROCYTE [DISTWIDTH] IN BLOOD BY AUTOMATED COUNT: 13 % (ref 10–15)
FASTING STATUS PATIENT QL REPORTED: YES
GFR SERPL CREATININE-BSD FRML MDRD: >90 ML/MIN/1.73M2
GLUCOSE BLD-MCNC: 95 MG/DL (ref 70–99)
HCT VFR BLD AUTO: 49.6 % (ref 35–47)
HDLC SERPL-MCNC: 44 MG/DL
HGB BLD-MCNC: 16.4 G/DL (ref 11.7–15.7)
LDLC SERPL CALC-MCNC: 149 MG/DL
LYMPHOCYTES # BLD AUTO: 1.8 10E3/UL (ref 0.8–5.3)
LYMPHOCYTES NFR BLD AUTO: 24 %
MCH RBC QN AUTO: 30.9 PG (ref 26.5–33)
MCHC RBC AUTO-ENTMCNC: 33.1 G/DL (ref 31.5–36.5)
MCV RBC AUTO: 94 FL (ref 78–100)
MONOCYTES # BLD AUTO: 0.5 10E3/UL (ref 0–1.3)
MONOCYTES NFR BLD AUTO: 6 %
NEUTROPHILS # BLD AUTO: 5.2 10E3/UL (ref 1.6–8.3)
NEUTROPHILS NFR BLD AUTO: 68 %
NONHDLC SERPL-MCNC: 173 MG/DL
PLATELET # BLD AUTO: 264 10E3/UL (ref 150–450)
POTASSIUM BLD-SCNC: 3.9 MMOL/L (ref 3.4–5.3)
PROT SERPL-MCNC: 7.7 G/DL (ref 6.8–8.8)
RBC # BLD AUTO: 5.3 10E6/UL (ref 3.8–5.2)
SODIUM SERPL-SCNC: 140 MMOL/L (ref 133–144)
TRIGL SERPL-MCNC: 120 MG/DL
TSH SERPL DL<=0.005 MIU/L-ACNC: 1.66 MU/L (ref 0.4–4)
WBC # BLD AUTO: 7.6 10E3/UL (ref 4–11)

## 2022-05-23 PROCEDURE — 36415 COLL VENOUS BLD VENIPUNCTURE: CPT | Performed by: PHYSICIAN ASSISTANT

## 2022-05-23 PROCEDURE — 80050 GENERAL HEALTH PANEL: CPT | Performed by: PHYSICIAN ASSISTANT

## 2022-05-23 PROCEDURE — 80061 LIPID PANEL: CPT | Performed by: PHYSICIAN ASSISTANT

## 2022-05-23 PROCEDURE — 99396 PREV VISIT EST AGE 40-64: CPT | Mod: 25 | Performed by: PHYSICIAN ASSISTANT

## 2022-05-23 PROCEDURE — 91305 COVID-19,PF,PFIZER (12+ YRS): CPT | Performed by: PHYSICIAN ASSISTANT

## 2022-05-23 PROCEDURE — 0054A COVID-19,PF,PFIZER (12+ YRS): CPT | Performed by: PHYSICIAN ASSISTANT

## 2022-05-23 RX ORDER — SERTRALINE HYDROCHLORIDE 100 MG/1
TABLET, FILM COATED ORAL
Qty: 90 TABLET | Refills: 3 | Status: SHIPPED | OUTPATIENT
Start: 2022-05-23 | End: 2023-05-08

## 2022-05-23 ASSESSMENT — ENCOUNTER SYMPTOMS
HEMATURIA: 0
HEADACHES: 0
ABDOMINAL PAIN: 0
MYALGIAS: 0
COUGH: 0
DIARRHEA: 0
PALPITATIONS: 0
DIZZINESS: 0
JOINT SWELLING: 0
CHILLS: 0
EYE PAIN: 0
HEARTBURN: 0
SHORTNESS OF BREATH: 0
NAUSEA: 0
DYSURIA: 0
PARESTHESIAS: 0
WEAKNESS: 0
SORE THROAT: 0
NERVOUS/ANXIOUS: 1
ARTHRALGIAS: 0
BREAST MASS: 0
FREQUENCY: 0
FEVER: 0
HEMATOCHEZIA: 0
CONSTIPATION: 0

## 2022-05-23 ASSESSMENT — PATIENT HEALTH QUESTIONNAIRE - PHQ9
SUM OF ALL RESPONSES TO PHQ QUESTIONS 1-9: 11
SUM OF ALL RESPONSES TO PHQ QUESTIONS 1-9: 11
10. IF YOU CHECKED OFF ANY PROBLEMS, HOW DIFFICULT HAVE THESE PROBLEMS MADE IT FOR YOU TO DO YOUR WORK, TAKE CARE OF THINGS AT HOME, OR GET ALONG WITH OTHER PEOPLE: SOMEWHAT DIFFICULT

## 2022-05-23 NOTE — LETTER
May 24, 2022      Manny Burton  [address]  [place] [address]        Dear ,    We are writing to inform you of your test results.    Your test results fall within the expected range(s) or remain unchanged from previous results.  Please continue with current treatment plan.    Resulted Orders   Lipid panel reflex to direct LDL Fasting   Result Value Ref Range    Cholesterol 217 (H) <200 mg/dL    Triglycerides 120 <150 mg/dL    Direct Measure HDL 44 (L) >=50 mg/dL    LDL Cholesterol Calculated 149 (H) <=100 mg/dL    Non HDL Cholesterol 173 (H) <130 mg/dL    Patient Fasting > 8hrs? Yes     Narrative    Cholesterol  Desirable:  <200 mg/dL    Triglycerides  Normal:  Less than 150 mg/dL  Borderline High:  150-199 mg/dL  High:  200-499 mg/dL  Very High:  Greater than or equal to 500 mg/dL    Direct Measure HDL  Female:  Greater than or equal to 50 mg/dL   Male:  Greater than or equal to 40 mg/dL    LDL Cholesterol  Desirable:  <100mg/dL  Above Desirable:  100-129 mg/dL   Borderline High:  130-159 mg/dL   High:  160-189 mg/dL   Very High:  >= 190 mg/dL    Non HDL Cholesterol  Desirable:  130 mg/dL  Above Desirable:  130-159 mg/dL  Borderline High:  160-189 mg/dL  High:  190-219 mg/dL  Very High:  Greater than or equal to 220 mg/dL   Comprehensive metabolic panel (BMP + Alb, Alk Phos, ALT, AST, Total. Bili, TP)   Result Value Ref Range    Sodium 140 133 - 144 mmol/L    Potassium 3.9 3.4 - 5.3 mmol/L    Chloride 108 94 - 109 mmol/L    Carbon Dioxide (CO2) 26 20 - 32 mmol/L    Anion Gap 6 3 - 14 mmol/L    Urea Nitrogen 14 7 - 30 mg/dL    Creatinine 0.65 0.52 - 1.04 mg/dL    Calcium 8.5 8.5 - 10.1 mg/dL    Glucose 95 70 - 99 mg/dL    Alkaline Phosphatase 74 40 - 150 U/L    AST 8 0 - 45 U/L    ALT 13 0 - 50 U/L    Protein Total 7.7 6.8 - 8.8 g/dL    Albumin 3.7 3.4 - 5.0 g/dL    Bilirubin Total 0.4 0.2 - 1.3 mg/dL    GFR Estimate >90 >60 mL/min/1.73m2      Comment:      Effective December 21, 2021 eGFRcr  in adults is calculated using the 2021 CKD-EPI creatinine equation which includes age and gender (Eugene et al., [facility], DOI: 10.1056/MSGCfo4411891)   TSH with free T4 reflex   Result Value Ref Range    TSH 1.66 0.40 - 4.00 mU/L   CBC with platelets and differential   Result Value Ref Range    WBC Count 7.6 4.0 - 11.0 10e3/uL    RBC Count 5.30 (H) 3.80 - 5.20 10e6/uL    Hemoglobin 16.4 (H) 11.7 - 15.7 g/dL    Hematocrit 49.6 (H) 35.0 - 47.0 %    MCV 94 78 - 100 fL    MCH 30.9 26.5 - 33.0 pg    MCHC 33.1 31.5 - 36.5 g/dL    RDW 13.0 10.0 - 15.0 %    Platelet Count 264 150 - 450 10e3/uL    % Neutrophils 68 %    % Lymphocytes 24 %    % Monocytes 6 %    % Eosinophils 2 %    % Basophils 1 %    Absolute Neutrophils 5.2 1.6 - 8.3 10e3/uL    Absolute Lymphocytes 1.8 0.8 - 5.3 10e3/uL    Absolute Monocytes 0.5 0.0 - 1.3 10e3/uL    Absolute Eosinophils 0.1 0.0 - 0.7 10e3/uL    Absolute Basophils 0.1 0.0 - 0.2 10e3/uL       If you have any questions or concerns, please call the clinic at the number listed above.       Sincerely,      Wei Ybarra PA-C/martin

## 2022-06-08 LAB — NONINV COLON CA DNA+OCC BLD SCRN STL QL: NEGATIVE

## 2022-06-30 ENCOUNTER — ANCILLARY PROCEDURE (OUTPATIENT)
Dept: MAMMOGRAPHY | Facility: CLINIC | Age: 57
End: 2022-06-30
Attending: PHYSICIAN ASSISTANT
Payer: COMMERCIAL

## 2022-06-30 DIAGNOSIS — Z12.31 VISIT FOR SCREENING MAMMOGRAM: ICD-10-CM

## 2022-06-30 PROCEDURE — 77067 SCR MAMMO BI INCL CAD: CPT | Mod: TC | Performed by: RADIOLOGY

## 2022-08-17 ENCOUNTER — TELEPHONE (OUTPATIENT)
Dept: OPHTHALMOLOGY | Facility: CLINIC | Age: 57
End: 2022-08-17

## 2022-08-17 ENCOUNTER — OFFICE VISIT (OUTPATIENT)
Dept: OPTOMETRY | Facility: CLINIC | Age: 57
End: 2022-08-17
Payer: COMMERCIAL

## 2022-08-17 DIAGNOSIS — H02.886 MEIBOMIAN GLAND DYSFUNCTION (MGD) OF BOTH EYES: ICD-10-CM

## 2022-08-17 DIAGNOSIS — H02.834 DERMATOCHALASIS OF BOTH UPPER EYELIDS: ICD-10-CM

## 2022-08-17 DIAGNOSIS — H01.00A BLEPHARITIS OF UPPER AND LOWER EYELIDS OF BOTH EYES, UNSPECIFIED TYPE: Primary | ICD-10-CM

## 2022-08-17 DIAGNOSIS — H02.883 MEIBOMIAN GLAND DYSFUNCTION (MGD) OF BOTH EYES: ICD-10-CM

## 2022-08-17 DIAGNOSIS — H01.00B BLEPHARITIS OF UPPER AND LOWER EYELIDS OF BOTH EYES, UNSPECIFIED TYPE: Primary | ICD-10-CM

## 2022-08-17 DIAGNOSIS — H02.831 DERMATOCHALASIS OF BOTH UPPER EYELIDS: ICD-10-CM

## 2022-08-17 PROCEDURE — 99203 OFFICE O/P NEW LOW 30 MIN: CPT | Performed by: OPTOMETRIST

## 2022-08-17 RX ORDER — ERYTHROMYCIN 5 MG/G
OINTMENT OPHTHALMIC
Qty: 3.5 G | Refills: 11 | Status: ON HOLD | OUTPATIENT
Start: 2022-08-17 | End: 2024-06-08

## 2022-08-17 ASSESSMENT — CONF VISUAL FIELD
OD_NORMAL: 1
OS_NORMAL: 1

## 2022-08-17 ASSESSMENT — EXTERNAL EXAM - LEFT EYE: OS_EXAM: NORMAL

## 2022-08-17 ASSESSMENT — EXTERNAL EXAM - RIGHT EYE: OD_EXAM: NORMAL

## 2022-08-17 ASSESSMENT — VISUAL ACUITY
METHOD: SNELLEN - LINEAR
OD_CC: 20/20
OS_CC: 20/20
OD_CC+: -2
CORRECTION_TYPE: GLASSES

## 2022-08-17 ASSESSMENT — REFRACTION_WEARINGRX: SPECS_TYPE: SVL DISTANCE

## 2022-08-17 NOTE — LETTER
"    8/17/2022         RE: Eli Burton  [address]  [facility] [address]        Dear Colleague,    Thank you for referring your patient, Eli Burton, to the [facility]. Please see a copy of my visit note below.    Chief Complaint   Patient presents with     Eye Pain     For the past month or so, she has been noticing some ongoing irritation in each eye. Noticing the symptoms more in the right eye than left eye but both are irritating her. She has been waking up with her eyelids \"pasty\" and irritated in each eye. Right eye seems to stay uncomfortable most of the time but the left eye feels better.     Eyes are red, itchy, dry, watery. right eye also seems more squinty. She also has a white bubble on her CHARLEY that has been there for a few years that she had evaluated at her last exam at [facility] but they told her to put warm compresses on it which did not help.     She uses Refresh Optive PF tears ~3x per day which seem to help a little bit.     No use of warm/cold compress or allergy meds.      OK to dilate if needed.     Do you wear contact lenses? No      Maria Luz Garcia     See Review Of Systems   Eyes: itching, redness, irritation, ptosis right eye > left eye   Constitutional: No fevers, chills, or weight changes.      Medical, surgical and family histories reviewed and updated 8/17/2022.         OBJECTIVE: See Ophthalmology exam    ASSESSMENT:    ICD-10-CM    1. Blepharitis of upper and lower eyelids of both eyes, unspecified type  H01.00A erythromycin (ROMYCIN) 5 MG/GM ophthalmic ointment    H01.00B    2. Meibomian gland dysfunction (MGD) of both eyes  H02.883     H02.886    3. Dermatochalasis of both upper eyelids  H02.831     H02.834       PLAN:    Patient Instructions     Blepharitis is a chronic or long term inflammation of the eyelids and eyelashes. It affects all ages and may appear as greasy flakes on the base of the eyelashes, crusting of eyelashes and mild " redness of the eyelid margins.  Sometimes it may result in an acute infection of a gland in the eyelid called a stye and sometimes painless firm nodules can form in the eyelid.  Overabundance of bacterial microorganisms along the eyelashes and lid margins induce stress on the tear film and promote inflammation.    Treatment includes warm compresses and improved lid hygiene. Regular lid hygiene helps diminish the bacterial population to prevent inflammation and infection.    Eyelid cleansers maintain clean and healthy eyelid margins.  Ocusoft or Sterilid are commercial products that are available as individual wrapped cleansing pads and can be purchased at most pharmacies. Cleanse lids once daily with a lid cleansing product as directed. Diluted baby shampoo can also be safely applied to the eyelids and is another method to improve lid hygiene.   Directions for warm compresses:  Moisten a washcloth with hot water or microwave for 10 seconds, being careful to not get the cloth too hot. Then put the washcloth onto your eyelids for 5 minutes. It may cool rather quickly so a rice pack or eyemask that can be heated and laid on top of the washcloth will help retain the heat for longer periods.      Begin use of warm compresses 1-2x daily.   Begin daily use of eyelid wipes (Ocusoft or Sterilid) and nightly use of erythromycin ointment to help improve eyelid hygiene.   Continue artificial tears up to 4x daily in each eye.     Refer to oculoplastics for blepharoplasty evaluation.     Notify me with any further concerns.       Reagan Groves OD  [address] [place]  [address]    [phone]           Again, thank you for allowing me to participate in the care of your patient.        Sincerely,        Reagan Groves OD

## 2022-08-17 NOTE — PROGRESS NOTES
"Chief Complaint   Patient presents with     Eye Pain     For the past month or so, she has been noticing some ongoing irritation in each eye. Noticing the symptoms more in the right eye than left eye but both are irritating her. She has been waking up with her eyelids \"pasty\" and irritated in each eye. Right eye seems to stay uncomfortable most of the time but the left eye feels better.     Eyes are red, itchy, dry, watery. right eye also seems more squinty. She also has a white bubble on her CHARLEY that has been there for a few years that she had evaluated at her last exam at [facility] [place] but they told her to put warm compresses on it which did not help.     She uses Refresh Optive PF tears ~3x per day which seem to help a little bit.     No use of warm/cold compress or allergy meds.      OK to dilate if needed.     Do you wear contact lenses? No      Maria Luz Garcia     See Review Of Systems   Eyes: itching, redness, irritation, ptosis right eye > left eye   Constitutional: No fevers, chills, or weight changes.      Medical, surgical and family histories reviewed and updated 8/17/2022.         OBJECTIVE: See Ophthalmology exam    ASSESSMENT:    ICD-10-CM    1. Blepharitis of upper and lower eyelids of both eyes, unspecified type  H01.00A erythromycin (ROMYCIN) 5 MG/GM ophthalmic ointment    H01.00B    2. Meibomian gland dysfunction (MGD) of both eyes  H02.883     H02.886    3. Dermatochalasis of both upper eyelids  H02.831     H02.834       PLAN:    Patient Instructions     Blepharitis is a chronic or long term inflammation of the eyelids and eyelashes. It affects all ages and may appear as greasy flakes on the base of the eyelashes, crusting of eyelashes and mild redness of the eyelid margins.  Sometimes it may result in an acute infection of a gland in the eyelid called a stye and sometimes painless firm nodules can form in the eyelid.  Overabundance of bacterial microorganisms along the eyelashes and lid margins " induce stress on the tear film and promote inflammation.    Treatment includes warm compresses and improved lid hygiene. Regular lid hygiene helps diminish the bacterial population to prevent inflammation and infection.    Eyelid cleansers maintain clean and healthy eyelid margins.  Ocusoft or Sterilid are commercial products that are available as individual wrapped cleansing pads and can be purchased at most pharmacies. Cleanse lids once daily with a lid cleansing product as directed. Diluted baby shampoo can also be safely applied to the eyelids and is another method to improve lid hygiene.   Directions for warm compresses:  Moisten a washcloth with hot water or microwave for 10 seconds, being careful to not get the cloth too hot. Then put the washcloth onto your eyelids for 5 minutes. It may cool rather quickly so a rice pack or eyemask that can be heated and laid on top of the washcloth will help retain the heat for longer periods.      Begin use of warm compresses 1-2x daily.   Begin daily use of eyelid wipes (Ocusoft or Sterilid) and nightly use of erythromycin ointment to help improve eyelid hygiene.   Continue artificial tears up to 4x daily in each eye.     Refer to oculoplastics for blepharoplasty evaluation.     Notify me with any further concerns.       Reagan Groves OD  [facility] [place]  [address] [place] [address]. [place]IN Castaneda  [phone]

## 2022-08-17 NOTE — TELEPHONE ENCOUNTER
8/17 Provided phone number [phone] to schedule consult with Dr. dIa Maier for blepharoplasty evaluation.     Casandra velarde Procedure   Orthopedics, Podiatry, Sports Medicine, ENT/Eye Specialties  [facility]   [phone]

## 2022-08-17 NOTE — PATIENT INSTRUCTIONS
Blepharitis is a chronic or long term inflammation of the eyelids and eyelashes. It affects all ages and may appear as greasy flakes on the base of the eyelashes, crusting of eyelashes and mild redness of the eyelid margins.  Sometimes it may result in an acute infection of a gland in the eyelid called a stye and sometimes painless firm nodules can form in the eyelid.  Overabundance of bacterial microorganisms along the eyelashes and lid margins induce stress on the tear film and promote inflammation.    Treatment includes warm compresses and improved lid hygiene. Regular lid hygiene helps diminish the bacterial population to prevent inflammation and infection.    Eyelid cleansers maintain clean and healthy eyelid margins.  Ocusoft or Sterilid are commercial products that are available as individual wrapped cleansing pads and can be purchased at most pharmacies. Cleanse lids once daily with a lid cleansing product as directed. Diluted baby shampoo can also be safely applied to the eyelids and is another method to improve lid hygiene.   Directions for warm compresses:  Moisten a washcloth with hot water or microwave for 10 seconds, being careful to not get the cloth too hot. Then put the washcloth onto your eyelids for 5 minutes. It may cool rather quickly so a rice pack or eyemask that can be heated and laid on top of the washcloth will help retain the heat for longer periods.      Begin use of warm compresses 1-2x daily.   Begin daily use of eyelid wipes (Ocusoft or Sterilid) and nightly use of erythromycin ointment to help improve eyelid hygiene.   Continue artificial tears up to 4x daily in each eye.     Refer to oculoplastics for blepharoplasty evaluation.     Notify me with any further concerns.       Reagan Groves, YOSEF  [facility]  [address] [place] [address]. [place]  [place]  [address]    [phone]

## 2022-08-23 ENCOUNTER — TELEPHONE (OUTPATIENT)
Dept: FAMILY MEDICINE | Facility: CLINIC | Age: 57
End: 2022-08-23

## 2022-08-23 NOTE — TELEPHONE ENCOUNTER
8/23 2nd attempt.  Provided phone number [phone] to schedule consult with Dr. Ida Maier for blepharoplasty evaluation.      Casandra velarde Procedure   Orthopedics, Podiatry, Sports Medicine, ENT/Eye Specialties  [facility]   [phone]

## 2022-08-23 NOTE — TELEPHONE ENCOUNTER
Patient Quality Outreach    Patient is due for the following:   Cervical Cancer Screening - PAP Needed  Physical     Next Steps:   Letter    Type of outreach:    Sent letter.      Questions for provider review:    None     Stormy Lewis RN

## 2022-08-31 NOTE — TELEPHONE ENCOUNTER
8/31 3rd attempt.  Provided phone number [phone] to schedule consult with Dr. Ida Maier for blepharoplasty evaluation.      Casandra velarde Procedure   Orthopedics, Podiatry, Sports Medicine, ENT/Eye Specialties  [facility]   [phone]

## 2022-09-29 ENCOUNTER — TELEPHONE (OUTPATIENT)
Dept: FAMILY MEDICINE | Facility: CLINIC | Age: 57
End: 2022-09-29

## 2023-07-09 ENCOUNTER — OFFICE VISIT (OUTPATIENT)
Dept: URGENT CARE | Facility: URGENT CARE | Age: 58
End: 2023-07-09
Payer: COMMERCIAL

## 2023-07-09 ENCOUNTER — NURSE TRIAGE (OUTPATIENT)
Dept: NURSING | Facility: CLINIC | Age: 58
End: 2023-07-09
Payer: COMMERCIAL

## 2023-07-09 ENCOUNTER — ANCILLARY PROCEDURE (OUTPATIENT)
Dept: GENERAL RADIOLOGY | Facility: CLINIC | Age: 58
End: 2023-07-09
Attending: STUDENT IN AN ORGANIZED HEALTH CARE EDUCATION/TRAINING PROGRAM
Payer: COMMERCIAL

## 2023-07-09 VITALS
WEIGHT: 245.2 LBS | HEART RATE: 80 BPM | OXYGEN SATURATION: 96 % | DIASTOLIC BLOOD PRESSURE: 81 MMHG | BODY MASS INDEX: 38.4 KG/M2 | SYSTOLIC BLOOD PRESSURE: 137 MMHG | TEMPERATURE: 98.8 F

## 2023-07-09 DIAGNOSIS — M54.42 ACUTE LEFT-SIDED LOW BACK PAIN WITH LEFT-SIDED SCIATICA: ICD-10-CM

## 2023-07-09 DIAGNOSIS — M54.42 ACUTE LEFT-SIDED LOW BACK PAIN WITH LEFT-SIDED SCIATICA: Primary | ICD-10-CM

## 2023-07-09 PROCEDURE — 99214 OFFICE O/P EST MOD 30 MIN: CPT | Performed by: STUDENT IN AN ORGANIZED HEALTH CARE EDUCATION/TRAINING PROGRAM

## 2023-07-09 PROCEDURE — 72100 X-RAY EXAM L-S SPINE 2/3 VWS: CPT | Mod: TC | Performed by: RADIOLOGY

## 2023-07-09 RX ORDER — METHYLPREDNISOLONE 4 MG
TABLET, DOSE PACK ORAL
Qty: 21 TABLET | Refills: 0 | Status: ON HOLD | OUTPATIENT
Start: 2023-07-09 | End: 2024-06-08

## 2023-07-09 RX ORDER — TIZANIDINE 2 MG/1
2 TABLET ORAL 3 TIMES DAILY PRN
Qty: 30 TABLET | Refills: 0 | Status: ON HOLD | OUTPATIENT
Start: 2023-07-09 | End: 2024-06-08

## 2023-07-09 NOTE — LETTER
July 9, 2023      Eli Burton  [address] [facility] [address]        To Whom It May Concern:    Eli Burton  was seen on 7/9/23 for pinched nerve in the low back. She needs to be off of work to allow for rest of the muscles that are causing the pinched nerve. I prescribed a treatment plan and medications. I anticipate she will have improvement of symptoms over the next couple weeks. I advised that she is off work for one week but may return sooner for a few hours if needed based on how her pain is doing.        Sincerely,        VANESSA Urbano CNP

## 2023-07-09 NOTE — TELEPHONE ENCOUNTER
"Triage Call:     Pt calling to report that she thinks that she \"twisted her back\" last week while reaching up high to get a tent down from storage to use; causing her some gradually worsening back pain in her left lower back now. Pt states that the pain also radiates from her left lower back down her left let to the back of her knee.     Pt is tearing up on the phone; stating that she has not been able to sleep due to the pain. She can not get into a comfortable position.     Pt rates her pain an 8/10   Taking motrin    Disposition: See HCP within 4 hours. Care advice given. Pt plans to go to the nearby [facility] now.    Reason for Disposition    [1] SEVERE back pain (e.g., excruciating, unable to do any normal activities) AND [2] not improved 2 hours after pain medicine    Additional Information    Negative: Passed out (i.e., lost consciousness, collapsed and was not responding)    Negative: Shock suspected (e.g., cold/pale/clammy skin, too weak to stand, low BP, rapid pulse)    Negative: Sounds like a life-threatening emergency to the triager    Negative: [1] SEVERE back pain (e.g., excruciating) AND [2] sudden onset AND [3] age > 60 years    Negative: [1] Unable to urinate (or only a few drops) > 4 hours AND [2] bladder feels very full (e.g., palpable bladder or strong urge to urinate)    Negative: [1] Loss of bladder or bowel control (urine or bowel incontinence; wetting self, leaking stool) AND [2] new-onset    Negative: Numbness in groin or rectal area (i.e., loss of sensation)    Negative: [1] Abdominal pain AND [2] age > 60 years    Negative: [1] SEVERE abdominal pain AND [2] present > 1 hour    Negative: Weakness of a leg or foot (e.g., unable to bear weight, dragging foot)    Negative: Unable to walk    Negative: Patient sounds very sick or weak to the triager    Protocols used: BACK PAIN-A-    Nakia Rosales RN  [facility] Nurse Advisor 1:50 PM 7/9/2023  "

## 2023-07-09 NOTE — PROGRESS NOTES
Assessment & Plan     Acute left-sided low back pain with left-sided sciatica  X-Ray of lumbar spine reviewed and most notably there is anterolisthesis likely grade 1 at L3 on L4 which may be from pulling of the lumbar paraspinal muscles in that region which I discussed with patient. I advised steroid burst with medrol dosepak to treat inflammation in hopes this will alleviate pressure on the nerve, tizanidine to relax the muscles in the low back - reviewed poss side effects and not to drive or operate machinery after taking this medication, physical therapy referral and to see spine specialist if symptoms are not improving with conservative measures. Advised she is off work for 1 week then re-evaluate, okay to return to work if symptoms are improving and able to do work tasks without exacerbating symptoms.   - XR Lumbar Spine 2/3 Views  - tiZANidine (ZANAFLEX) 2 MG tablet  Dispense: 30 tablet; Refill: 0  - methylPREDNISolone (MEDROL DOSEPAK) 4 MG tablet therapy pack  Dispense: 21 tablet; Refill: 0  - Physical Therapy Referral  - Spine  Referral    30 minutes spent on the date of the encounter doing chart review, review of test results, interpretation of tests, patient visit and documentation     Easton Nice RN, FNPS  [facility]    VANESSA Urbano [facility] URGENT [facility]    Porfirio Bryant is a 58 year old female who presents to clinic today for the following health issues:  Chief Complaint   Patient presents with     Back Pain     PT WAS TRYING TO GET SCREEN OUT OF [employer] SHE WAS TURNING AND PULLING, WHEN PT GOT OUT OF [employer]PER SHE COULD BARELY STAND UP AND WALK, PT HAVING PAIN IN LOWER LEFT SIDE STARTED LAST SUNDAY PAIN IS WORSENING, PT IS TAKING MOTRIN FOR PAIN NOT REALLY HELPING.      HPI    58-year-old female presents to clinic for back pain. Last Sunday she was lifting a screened tent off her bunk bed in her camper. Then she drove an hour away and was  unable to stand.     Pain is located at her left lower back pain and down to her left knee. The pain is starting to increase up the back on the left side. Pain is rated for 8/10, constant, sharp, and  tingling/numbness. She has been taking 200 mg Motrin, takes 2 tablets every 4-6 hours per day. She uses ice, which helps for a few minutes. It is painful to lay down.     She denies any concerns with urination and bowel movements. She doesn't have any previous back pain history. She works in a dental lab and stands intermittently.     Review of Systems  Constitutional, HEENT, cardiovascular, pulmonary, gi and gu systems are negative, except as otherwise noted.      Objective    /81 (BP Location: Left arm, Patient Position: Sitting, Cuff Size: Adult Large)   Pulse 80   Temp 98.8  F (37.1  C) (Tympanic)   Wt 111.2 kg (245 lb 3.2 oz)   SpO2 96%   BMI 38.40 kg/m    Physical Exam   GENERAL: healthy, alert and no distress  MS: Lower left lumbar paraspinal muscles are hypertrophied, tight and tender to palpation; pain in left low back causing decreased ROM of lumbar spine  SKIN: no lesions or rashes  NEURO: normal sensation of lower extremities     Results for orders placed or performed in visit on 07/09/23   XR Lumbar Spine 2/3 Views     Status: None    Narrative    EXAM: XR LUMBAR SPINE 2/3 VIEWS  LOCATION: [facility]  DATE: 07/09/2023    INDICATION: Left low back pain with radiation to the left lateral thigh.  COMPARISON: None.      Impression    IMPRESSION: No fracture is identified. Multilevel moderate degenerative disc disease. Multilevel moderate to severe facet arthropathy. Multilevel grade 1 spondylolisthesis which is worst at L3-L4.where here is grade 1 anterolisthesis of L3 on L4.

## 2023-07-09 NOTE — PATIENT INSTRUCTIONS
Start steroid medrol dose pack - follow dosing instructions from pharmacy    Muscle relaxer tizanidine - take as needed up to 3 times daily. Don't drive or operate machinery after taking this medication as it may cause drowsiness.    Salon Pas patches    Try applying heating pad to the area for 20 minutes every 2 hours. May alternate with ice pack.    Physical therapy referral - You will receive a call from a  to set up an appointment.    Off work for 1 week then re-evaluate by seeing primary care or returning to urgent care.    Spine specialist referral - set up appointment and you can cancel the appointment if the symptoms are improving     Heena Singleton, CNP

## 2023-07-18 ENCOUNTER — OFFICE VISIT (OUTPATIENT)
Dept: URGENT CARE | Facility: URGENT CARE | Age: 58
End: 2023-07-18
Payer: COMMERCIAL

## 2023-07-18 VITALS
BODY MASS INDEX: 37.92 KG/M2 | SYSTOLIC BLOOD PRESSURE: 139 MMHG | OXYGEN SATURATION: 99 % | RESPIRATION RATE: 16 BRPM | WEIGHT: 242.1 LBS | HEART RATE: 73 BPM | DIASTOLIC BLOOD PRESSURE: 85 MMHG | TEMPERATURE: 98.2 F

## 2023-07-18 DIAGNOSIS — M54.42 ACUTE LEFT-SIDED LOW BACK PAIN WITH LEFT-SIDED SCIATICA: Primary | ICD-10-CM

## 2023-07-18 PROCEDURE — 99214 OFFICE O/P EST MOD 30 MIN: CPT | Performed by: PHYSICIAN ASSISTANT

## 2023-07-18 RX ORDER — METHOCARBAMOL 500 MG/1
500 TABLET, FILM COATED ORAL 4 TIMES DAILY PRN
Qty: 30 TABLET | Refills: 0 | Status: ON HOLD | OUTPATIENT
Start: 2023-07-18 | End: 2024-06-08

## 2023-07-18 RX ORDER — PREDNISONE 20 MG/1
40 TABLET ORAL DAILY
Qty: 14 TABLET | Refills: 0 | Status: SHIPPED | OUTPATIENT
Start: 2023-07-18 | End: 2023-07-25

## 2023-07-18 ASSESSMENT — ENCOUNTER SYMPTOMS
NECK PAIN: 0
DIARRHEA: 0
CONSTIPATION: 0
NAUSEA: 0
LIGHT-HEADEDNESS: 0
NECK STIFFNESS: 0
FATIGUE: 0
NUMBNESS: 0
ARTHRALGIAS: 0
CHEST TIGHTNESS: 0
PARESTHESIAS: 0
BACK PAIN: 1
PALPITATIONS: 0
WEAKNESS: 0
HEADACHES: 0
COUGH: 0
DYSURIA: 0
CHILLS: 0
TREMORS: 0
FLANK PAIN: 0
SHORTNESS OF BREATH: 0
VOMITING: 0
ABDOMINAL PAIN: 0
DIZZINESS: 0
FEVER: 0
JOINT SWELLING: 0
COLOR CHANGE: 0
DIFFICULTY URINATING: 0
MYALGIAS: 1
WOUND: 0

## 2023-07-18 NOTE — PROGRESS NOTES
Porfirio Bryant is a 58 year old, presenting for the following health issues:  Urgent Care and Hip Pain (Left side hip pain radiates to leg)  HPI   Back Pain  Onset/Duration: 16 days (7/2/23)  Description:   Location of pain: L lower back into hip, buttock, upper thigh  Character of pain: zinging,  Pain radiation: hip, buttock, upper thigh  New numbness or weakness in legs, not attributed to pain: Endorses numbness and tingling of L upper lateral thigh - this has been unchanged since the date of injury. No weakness.  No bladder or bowel dysfunction.  No swelling, redness, drainage or fevers.    Intensity: Moderate  Progression of Symptoms: Stable  History:   Specific cause: trying to get screen out of [place] - was turning and pulling  Pain interferes with job: yes  History of back problems: no prior back problems  Any previous MRI or X-rays: Lumbar XR from last UC visit  Sees a specialist for back pain: No  Alleviating factors:   Improved by: was somewhat improved with steroid burst from last UC visit    Precipitating factors:  Worsened by: Lifting, Bending, Standing, Sitting,  Walking   Therapies tried and outcome: acetaminophen (Tylenol),  rest and sitting with minimal relief  Accompanying Signs & Symptoms:  Risk of Fracture: None  Risk of Cauda Equina: None  Risk of Infection: None  Risk of Cancer: None  Risk of Ankylosing Spondylitis: Onset at age <35, male, AND morning back stiffness  no     Patient Active Problem List   Diagnosis     Tobacco abuse     Anxiety     CTS (carpal tunnel syndrome)     Trigger finger, acquired     S/P parathyroidectomy (H)     Hyperlipidemia LDL goal <130     Family history of colon cancer     History of hepatitis C     Obesity (BMI 35.0-39.9) with comorbidity (H)     Current Outpatient Medications   Medication     methocarbamol (ROBAXIN) 500 MG tablet     predniSONE (DELTASONE) 20 MG tablet     sertraline (ZOLOFT) 100 MG tablet     erythromycin (ROMYCIN) 5 MG/GM ophthalmic  ointment     methylPREDNISolone (MEDROL DOSEPAK) 4 MG tablet therapy pack     tiZANidine (ZANAFLEX) 2 MG tablet     No current facility-administered medications for this visit.      No Known Allergies    Review of Systems   Constitutional: Negative for chills, fatigue and fever.   Respiratory: Negative for cough, chest tightness and shortness of breath.    Cardiovascular: Negative for chest pain, palpitations and peripheral edema.   Gastrointestinal: Negative for abdominal pain, constipation, diarrhea, nausea and vomiting.   Genitourinary: Negative for decreased urine volume, difficulty urinating, dysuria, enuresis, flank pain and pelvic pain.   Musculoskeletal: Positive for back pain and myalgias. Negative for arthralgias, joint swelling, neck pain and neck stiffness.   Skin: Negative for color change, pallor, rash and wound.   Neurological: Negative for dizziness, tremors, weakness, light-headedness, numbness, headaches and paresthesias.        Negative for saddle anesthesia            Objective    /85 (BP Location: Left arm, Patient Position: Sitting, Cuff Size: Adult Large)   Pulse 73   Temp 98.2  F (36.8  C) (Tympanic)   Resp 16   Wt 109.8 kg (242 lb 1.6 oz)   SpO2 99%   BMI 37.92 kg/m    Body mass index is 37.92 kg/m .  Physical Exam  Vitals and nursing note reviewed.   Constitutional:       General: She is not in acute distress.     Appearance: Normal appearance. She is normal weight. She is not ill-appearing, toxic-appearing or diaphoretic.   HENT:      Head: Normocephalic and atraumatic.   Cardiovascular:      Rate and Rhythm: Normal rate and regular rhythm.      Pulses:           Dorsalis pedis pulses are 2+ on the right side and 2+ on the left side.        Posterior tibial pulses are 2+ on the right side and 2+ on the left side.      Heart sounds: Normal heart sounds.   Pulmonary:      Effort: Pulmonary effort is normal.      Breath sounds: Normal breath sounds.   Musculoskeletal:       Cervical back: Normal range of motion and neck supple.      Lumbar back: Spasms and tenderness present. No swelling, deformity or bony tenderness. Decreased range of motion. Positive left straight leg raise test. Negative right straight leg raise test.      Right hip: Normal strength.      Left hip: Normal strength.   Skin:     General: Skin is warm and dry.      Capillary Refill: Capillary refill takes less than 2 seconds.      Findings: No bruising, erythema, lesion or rash.   Neurological:      General: No focal deficit present.      Mental Status: She is alert and oriented to person, place, and time.      Sensory: Sensation is intact.      Motor: Motor function is intact. No weakness.      Coordination: Coordination is intact. Coordination normal.      Gait: Gait is intact.      Deep Tendon Reflexes: Reflexes normal.      Reflex Scores:       Patellar reflexes are 3+ on the right side and 3+ on the left side.       Achilles reflexes are 3+ on the right side and 3+ on the left side.  Psychiatric:         Mood and Affect: Mood normal.         Behavior: Behavior normal.         Thought Content: Thought content normal.         Judgment: Judgment normal.          Assessment/Plan:  Acute left-sided low back pain with left-sided sciatica: Eli was seen today for urgent care and hip pain. Patient is in no acute distress. Vitally stable. No concerning bowel/bladder sx or saddle anesthesia. Neurovascularly intact. Symptoms have been stable since onset. Has not been able to follow up with PT or spine specialist yet. Discussed that this is the best route of management going forward for her. Steroid and muscle relaxer did help with her symptoms after her last visit. Rx for Prednisone & Robaxin were sent in.  Discussed risks and benefits of medication along with side effects, direction for use.  No driving or operating machinery due to sedation. Stressed the importance of follow up with PT & spine specialist. Discussed  red flag symptoms warranting emergent medical evaluation. Patient voiced understanding and agreement with the plan as above.   -     predniSONE (DELTASONE) 20 MG tablet; Take 2 tablets (40 mg) by mouth daily for 7 days  -     methocarbamol (ROBAXIN) 500 MG tablet; Take 1 tablet (500 mg) by mouth 4 times daily as needed for muscle spasms        Physician Attestation   I, Kimberly Posey PA-C, was present with the medical/EMILE student, Minerva BACON, who participated in the service and in the documentation of the note.  I have verified the history and personally performed the physical exam and medical decision making.  I agree with the assessment and plan of care as documented in the note.        Kimberly Posey PA-C

## 2024-06-07 ENCOUNTER — APPOINTMENT (OUTPATIENT)
Dept: CT IMAGING | Facility: CLINIC | Age: 59
End: 2024-06-07
Attending: EMERGENCY MEDICINE
Payer: COMMERCIAL

## 2024-06-07 ENCOUNTER — OFFICE VISIT (OUTPATIENT)
Dept: URGENT CARE | Facility: URGENT CARE | Age: 59
End: 2024-06-07
Payer: COMMERCIAL

## 2024-06-07 ENCOUNTER — HOSPITAL ENCOUNTER (INPATIENT)
Facility: CLINIC | Age: 59
LOS: 2 days | Discharge: HOME | End: 2024-06-10
Attending: EMERGENCY MEDICINE | Admitting: INTERNAL MEDICINE
Payer: COMMERCIAL

## 2024-06-07 VITALS
TEMPERATURE: 99.5 F | DIASTOLIC BLOOD PRESSURE: 80 MMHG | WEIGHT: 241 LBS | RESPIRATION RATE: 16 BRPM | HEART RATE: 101 BPM | OXYGEN SATURATION: 96 % | SYSTOLIC BLOOD PRESSURE: 133 MMHG | BODY MASS INDEX: 37.75 KG/M2

## 2024-06-07 DIAGNOSIS — K57.20 DIVERTICULITIS OF LARGE INTESTINE WITH PERFORATION WITHOUT BLEEDING: ICD-10-CM

## 2024-06-07 DIAGNOSIS — N76.0 BV (BACTERIAL VAGINOSIS): ICD-10-CM

## 2024-06-07 DIAGNOSIS — R50.9 FEVER, UNSPECIFIED FEVER CAUSE: ICD-10-CM

## 2024-06-07 DIAGNOSIS — B96.89 BV (BACTERIAL VAGINOSIS): ICD-10-CM

## 2024-06-07 DIAGNOSIS — R10.9 SUDDEN ONSET OF SEVERE ABDOMINAL PAIN: Primary | ICD-10-CM

## 2024-06-07 DIAGNOSIS — R10.32 LLQ ABDOMINAL PAIN: ICD-10-CM

## 2024-06-07 PROBLEM — N94.9 VAGINAL SYMPTOM: Status: ACTIVE | Noted: 2024-06-07

## 2024-06-07 LAB
ALBUMIN SERPL BCG-MCNC: 4 G/DL (ref 3.5–5.2)
ALBUMIN UR-MCNC: 100 MG/DL
ALP SERPL-CCNC: 70 U/L (ref 40–150)
ALT SERPL W P-5'-P-CCNC: 7 U/L (ref 0–50)
ANION GAP SERPL CALCULATED.3IONS-SCNC: 12 MMOL/L (ref 7–15)
APPEARANCE UR: CLEAR
AST SERPL W P-5'-P-CCNC: 18 U/L (ref 0–45)
BACTERIA #/AREA URNS HPF: ABNORMAL /HPF
BASOPHILS # BLD AUTO: 0 10E3/UL (ref 0–0.2)
BASOPHILS NFR BLD AUTO: 0 %
BILIRUB SERPL-MCNC: 0.7 MG/DL
BILIRUB UR QL STRIP: ABNORMAL
BUN SERPL-MCNC: 9.3 MG/DL (ref 8–23)
CALCIUM SERPL-MCNC: 8.8 MG/DL (ref 8.8–10.2)
CHLORIDE SERPL-SCNC: 100 MMOL/L (ref 98–107)
CLUE CELLS: PRESENT
COLOR UR AUTO: YELLOW
CREAT SERPL-MCNC: 0.71 MG/DL (ref 0.51–0.95)
DEPRECATED HCO3 PLAS-SCNC: 24 MMOL/L (ref 22–29)
EGFRCR SERPLBLD CKD-EPI 2021: >90 ML/MIN/1.73M2
EOSINOPHIL # BLD AUTO: 0.1 10E3/UL (ref 0–0.7)
EOSINOPHIL NFR BLD AUTO: 0 %
ERYTHROCYTE [DISTWIDTH] IN BLOOD BY AUTOMATED COUNT: 12.7 % (ref 10–15)
GLUCOSE SERPL-MCNC: 103 MG/DL (ref 70–99)
GLUCOSE UR STRIP-MCNC: NEGATIVE MG/DL
HCT VFR BLD AUTO: 46.4 % (ref 35–47)
HGB BLD-MCNC: 15.4 G/DL (ref 11.7–15.7)
HGB UR QL STRIP: NEGATIVE
IMM GRANULOCYTES # BLD: 0.1 10E3/UL
IMM GRANULOCYTES NFR BLD: 0 %
KETONES UR STRIP-MCNC: 40 MG/DL
LEUKOCYTE ESTERASE UR QL STRIP: NEGATIVE
LYMPHOCYTES # BLD AUTO: 1.7 10E3/UL (ref 0.8–5.3)
LYMPHOCYTES NFR BLD AUTO: 11 %
MCH RBC QN AUTO: 30.5 PG (ref 26.5–33)
MCHC RBC AUTO-ENTMCNC: 33.2 G/DL (ref 31.5–36.5)
MCV RBC AUTO: 92 FL (ref 78–100)
MONOCYTES # BLD AUTO: 1.3 10E3/UL (ref 0–1.3)
MONOCYTES NFR BLD AUTO: 8 %
NEUTROPHILS # BLD AUTO: 13.3 10E3/UL (ref 1.6–8.3)
NEUTROPHILS NFR BLD AUTO: 81 %
NITRATE UR QL: NEGATIVE
NRBC # BLD AUTO: 0 10E3/UL
NRBC BLD AUTO-RTO: 0 /100
PH UR STRIP: 6.5 [PH] (ref 5–7)
PLATELET # BLD AUTO: 259 10E3/UL (ref 150–450)
POTASSIUM SERPL-SCNC: 4 MMOL/L (ref 3.4–5.3)
PROT SERPL-MCNC: 7.5 G/DL (ref 6.4–8.3)
RBC # BLD AUTO: 5.05 10E6/UL (ref 3.8–5.2)
RBC #/AREA URNS AUTO: ABNORMAL /HPF
SODIUM SERPL-SCNC: 136 MMOL/L (ref 135–145)
SP GR UR STRIP: >=1.03 (ref 1–1.03)
SQUAMOUS #/AREA URNS AUTO: ABNORMAL /LPF
TRICHOMONAS, WET PREP: ABNORMAL
UROBILINOGEN UR STRIP-ACNC: 2 E.U./DL
WBC # BLD AUTO: 16.4 10E3/UL (ref 4–11)
WBC #/AREA URNS AUTO: ABNORMAL /HPF
WBC'S/HIGH POWER FIELD, WET PREP: ABNORMAL
YEAST, WET PREP: ABNORMAL

## 2024-06-07 PROCEDURE — 85025 COMPLETE CBC W/AUTO DIFF WBC: CPT | Performed by: EMERGENCY MEDICINE

## 2024-06-07 PROCEDURE — 74177 CT ABD & PELVIS W/CONTRAST: CPT

## 2024-06-07 PROCEDURE — 250N000011 HC RX IP 250 OP 636: Performed by: EMERGENCY MEDICINE

## 2024-06-07 PROCEDURE — 83690 ASSAY OF LIPASE: CPT | Performed by: EMERGENCY MEDICINE

## 2024-06-07 PROCEDURE — 87210 SMEAR WET MOUNT SALINE/INK: CPT

## 2024-06-07 PROCEDURE — 81001 URINALYSIS AUTO W/SCOPE: CPT | Performed by: PHYSICIAN ASSISTANT

## 2024-06-07 PROCEDURE — 36415 COLL VENOUS BLD VENIPUNCTURE: CPT | Performed by: EMERGENCY MEDICINE

## 2024-06-07 PROCEDURE — 82247 BILIRUBIN TOTAL: CPT | Performed by: EMERGENCY MEDICINE

## 2024-06-07 PROCEDURE — 99207 PR INPT ADMISSION FROM CLINIC: CPT | Performed by: PHYSICIAN ASSISTANT

## 2024-06-07 PROCEDURE — 250N000009 HC RX 250: Performed by: EMERGENCY MEDICINE

## 2024-06-07 PROCEDURE — 80053 COMPREHEN METABOLIC PANEL: CPT | Performed by: EMERGENCY MEDICINE

## 2024-06-07 PROCEDURE — 99285 EMERGENCY DEPT VISIT HI MDM: CPT | Mod: 25

## 2024-06-07 RX ORDER — IOPAMIDOL 755 MG/ML
121 INJECTION, SOLUTION INTRAVASCULAR ONCE
Status: COMPLETED | OUTPATIENT
Start: 2024-06-07 | End: 2024-06-07

## 2024-06-07 RX ADMIN — SODIUM CHLORIDE 75 ML: 9 INJECTION, SOLUTION INTRAVENOUS at 23:56

## 2024-06-07 RX ADMIN — IOPAMIDOL 121 ML: 755 INJECTION, SOLUTION INTRAVENOUS at 23:55

## 2024-06-07 ASSESSMENT — ACTIVITIES OF DAILY LIVING (ADL)
ADLS_ACUITY_SCORE: 35

## 2024-06-08 PROBLEM — K57.20 DIVERTICULITIS OF LARGE INTESTINE WITH PERFORATION WITHOUT BLEEDING: Status: ACTIVE | Noted: 2024-06-08

## 2024-06-08 LAB
ALBUMIN UR-MCNC: 10 MG/DL
ANION GAP SERPL CALCULATED.3IONS-SCNC: 10 MMOL/L (ref 7–15)
APPEARANCE UR: CLEAR
BILIRUB UR QL STRIP: NEGATIVE
BUN SERPL-MCNC: 7.3 MG/DL (ref 8–23)
CALCIUM SERPL-MCNC: 8.5 MG/DL (ref 8.8–10.2)
CHLORIDE SERPL-SCNC: 103 MMOL/L (ref 98–107)
COLOR UR AUTO: YELLOW
CREAT SERPL-MCNC: 0.69 MG/DL (ref 0.51–0.95)
DEPRECATED HCO3 PLAS-SCNC: 26 MMOL/L (ref 22–29)
EGFRCR SERPLBLD CKD-EPI 2021: >90 ML/MIN/1.73M2
ERYTHROCYTE [DISTWIDTH] IN BLOOD BY AUTOMATED COUNT: 12.6 % (ref 10–15)
GLUCOSE SERPL-MCNC: 89 MG/DL (ref 70–99)
GLUCOSE UR STRIP-MCNC: NEGATIVE MG/DL
HCT VFR BLD AUTO: 44.6 % (ref 35–47)
HGB BLD-MCNC: 14.8 G/DL (ref 11.7–15.7)
HGB UR QL STRIP: NEGATIVE
KETONES UR STRIP-MCNC: 20 MG/DL
LEUKOCYTE ESTERASE UR QL STRIP: NEGATIVE
LIPASE SERPL-CCNC: 31 U/L (ref 13–60)
MCH RBC QN AUTO: 31.2 PG (ref 26.5–33)
MCHC RBC AUTO-ENTMCNC: 33.2 G/DL (ref 31.5–36.5)
MCV RBC AUTO: 94 FL (ref 78–100)
MUCOUS THREADS #/AREA URNS LPF: PRESENT /LPF
NITRATE UR QL: NEGATIVE
PH UR STRIP: 6.5 [PH] (ref 5–7)
PLATELET # BLD AUTO: 263 10E3/UL (ref 150–450)
POTASSIUM SERPL-SCNC: 4.2 MMOL/L (ref 3.4–5.3)
RBC # BLD AUTO: 4.75 10E6/UL (ref 3.8–5.2)
RBC URINE: 2 /HPF
SODIUM SERPL-SCNC: 139 MMOL/L (ref 135–145)
SP GR UR STRIP: 1.01 (ref 1–1.03)
SQUAMOUS EPITHELIAL: 1 /HPF
UROBILINOGEN UR STRIP-MCNC: NORMAL MG/DL
WBC # BLD AUTO: 14.4 10E3/UL (ref 4–11)
WBC URINE: 1 /HPF

## 2024-06-08 PROCEDURE — 999N000128 HC STATISTIC PERIPHERAL IV START W/O US GUIDANCE

## 2024-06-08 PROCEDURE — 80048 BASIC METABOLIC PNL TOTAL CA: CPT | Performed by: INTERNAL MEDICINE

## 2024-06-08 PROCEDURE — 250N000011 HC RX IP 250 OP 636: Mod: JZ | Performed by: EMERGENCY MEDICINE

## 2024-06-08 PROCEDURE — 250N000013 HC RX MED GY IP 250 OP 250 PS 637: Performed by: INTERNAL MEDICINE

## 2024-06-08 PROCEDURE — 81001 URINALYSIS AUTO W/SCOPE: CPT | Performed by: EMERGENCY MEDICINE

## 2024-06-08 PROCEDURE — 120N000001 HC R&B MED SURG/OB

## 2024-06-08 PROCEDURE — 99223 1ST HOSP IP/OBS HIGH 75: CPT | Performed by: INTERNAL MEDICINE

## 2024-06-08 PROCEDURE — 258N000003 HC RX IP 258 OP 636: Mod: JZ | Performed by: INTERNAL MEDICINE

## 2024-06-08 PROCEDURE — 85027 COMPLETE CBC AUTOMATED: CPT | Performed by: INTERNAL MEDICINE

## 2024-06-08 PROCEDURE — 36415 COLL VENOUS BLD VENIPUNCTURE: CPT | Performed by: INTERNAL MEDICINE

## 2024-06-08 PROCEDURE — 250N000011 HC RX IP 250 OP 636: Mod: JZ | Performed by: INTERNAL MEDICINE

## 2024-06-08 RX ORDER — NALOXONE HYDROCHLORIDE 0.4 MG/ML
0.4 INJECTION, SOLUTION INTRAMUSCULAR; INTRAVENOUS; SUBCUTANEOUS
Status: DISCONTINUED | OUTPATIENT
Start: 2024-06-08 | End: 2024-06-10 | Stop reason: HOSPADM

## 2024-06-08 RX ORDER — HYDROMORPHONE HCL IN WATER/PF 6 MG/30 ML
0.4 PATIENT CONTROLLED ANALGESIA SYRINGE INTRAVENOUS
Status: DISCONTINUED | OUTPATIENT
Start: 2024-06-08 | End: 2024-06-10 | Stop reason: HOSPADM

## 2024-06-08 RX ORDER — HYDRALAZINE HYDROCHLORIDE 20 MG/ML
10 INJECTION INTRAMUSCULAR; INTRAVENOUS EVERY 4 HOURS PRN
Status: DISCONTINUED | OUTPATIENT
Start: 2024-06-08 | End: 2024-06-10 | Stop reason: HOSPADM

## 2024-06-08 RX ORDER — ONDANSETRON 2 MG/ML
4 INJECTION INTRAMUSCULAR; INTRAVENOUS EVERY 6 HOURS PRN
Status: DISCONTINUED | OUTPATIENT
Start: 2024-06-08 | End: 2024-06-10 | Stop reason: HOSPADM

## 2024-06-08 RX ORDER — PROCHLORPERAZINE 25 MG
25 SUPPOSITORY, RECTAL RECTAL EVERY 12 HOURS PRN
Status: DISCONTINUED | OUTPATIENT
Start: 2024-06-08 | End: 2024-06-10 | Stop reason: HOSPADM

## 2024-06-08 RX ORDER — ACETAMINOPHEN 325 MG/1
650 TABLET ORAL EVERY 4 HOURS PRN
Status: DISCONTINUED | OUTPATIENT
Start: 2024-06-08 | End: 2024-06-10 | Stop reason: HOSPADM

## 2024-06-08 RX ORDER — HYDRALAZINE HYDROCHLORIDE 10 MG/1
10 TABLET, FILM COATED ORAL EVERY 4 HOURS PRN
Status: DISCONTINUED | OUTPATIENT
Start: 2024-06-08 | End: 2024-06-10 | Stop reason: HOSPADM

## 2024-06-08 RX ORDER — PIPERACILLIN SODIUM, TAZOBACTAM SODIUM 4; .5 G/20ML; G/20ML
4.5 INJECTION, POWDER, LYOPHILIZED, FOR SOLUTION INTRAVENOUS ONCE
Status: COMPLETED | OUTPATIENT
Start: 2024-06-08 | End: 2024-06-08

## 2024-06-08 RX ORDER — NALOXONE HYDROCHLORIDE 0.4 MG/ML
0.2 INJECTION, SOLUTION INTRAMUSCULAR; INTRAVENOUS; SUBCUTANEOUS
Status: DISCONTINUED | OUTPATIENT
Start: 2024-06-08 | End: 2024-06-10 | Stop reason: HOSPADM

## 2024-06-08 RX ORDER — HYDROMORPHONE HCL IN WATER/PF 6 MG/30 ML
0.2 PATIENT CONTROLLED ANALGESIA SYRINGE INTRAVENOUS
Status: DISCONTINUED | OUTPATIENT
Start: 2024-06-08 | End: 2024-06-10 | Stop reason: HOSPADM

## 2024-06-08 RX ORDER — PROCHLORPERAZINE MALEATE 10 MG
10 TABLET ORAL EVERY 6 HOURS PRN
Status: DISCONTINUED | OUTPATIENT
Start: 2024-06-08 | End: 2024-06-10 | Stop reason: HOSPADM

## 2024-06-08 RX ORDER — LORAZEPAM 0.5 MG/1
.5-1 TABLET ORAL EVERY 4 HOURS PRN
Status: DISCONTINUED | OUTPATIENT
Start: 2024-06-08 | End: 2024-06-10 | Stop reason: HOSPADM

## 2024-06-08 RX ORDER — ACETAMINOPHEN 650 MG/1
650 SUPPOSITORY RECTAL EVERY 4 HOURS PRN
Status: DISCONTINUED | OUTPATIENT
Start: 2024-06-08 | End: 2024-06-10 | Stop reason: HOSPADM

## 2024-06-08 RX ORDER — NICOTINE 21 MG/24HR
1 PATCH, TRANSDERMAL 24 HOURS TRANSDERMAL DAILY
Status: DISCONTINUED | OUTPATIENT
Start: 2024-06-08 | End: 2024-06-10 | Stop reason: HOSPADM

## 2024-06-08 RX ORDER — OXYCODONE HYDROCHLORIDE 5 MG/1
5 TABLET ORAL EVERY 4 HOURS PRN
Status: DISCONTINUED | OUTPATIENT
Start: 2024-06-08 | End: 2024-06-10 | Stop reason: HOSPADM

## 2024-06-08 RX ORDER — SODIUM CHLORIDE, SODIUM LACTATE, POTASSIUM CHLORIDE, CALCIUM CHLORIDE 600; 310; 30; 20 MG/100ML; MG/100ML; MG/100ML; MG/100ML
INJECTION, SOLUTION INTRAVENOUS CONTINUOUS
Status: DISCONTINUED | OUTPATIENT
Start: 2024-06-08 | End: 2024-06-09

## 2024-06-08 RX ORDER — ONDANSETRON 4 MG/1
4 TABLET, ORALLY DISINTEGRATING ORAL EVERY 6 HOURS PRN
Status: DISCONTINUED | OUTPATIENT
Start: 2024-06-08 | End: 2024-06-10 | Stop reason: HOSPADM

## 2024-06-08 RX ORDER — PIPERACILLIN SODIUM, TAZOBACTAM SODIUM 3; .375 G/15ML; G/15ML
3.38 INJECTION, POWDER, LYOPHILIZED, FOR SOLUTION INTRAVENOUS EVERY 6 HOURS
Status: DISCONTINUED | OUTPATIENT
Start: 2024-06-08 | End: 2024-06-10

## 2024-06-08 RX ORDER — LIDOCAINE 40 MG/G
CREAM TOPICAL
Status: DISCONTINUED | OUTPATIENT
Start: 2024-06-08 | End: 2024-06-10 | Stop reason: HOSPADM

## 2024-06-08 RX ORDER — ENOXAPARIN SODIUM 100 MG/ML
40 INJECTION SUBCUTANEOUS EVERY 24 HOURS
Status: DISCONTINUED | OUTPATIENT
Start: 2024-06-08 | End: 2024-06-09

## 2024-06-08 RX ADMIN — ACETAMINOPHEN 650 MG: 325 TABLET, FILM COATED ORAL at 08:55

## 2024-06-08 RX ADMIN — ENOXAPARIN SODIUM 40 MG: 40 INJECTION SUBCUTANEOUS at 08:46

## 2024-06-08 RX ADMIN — PIPERACILLIN AND TAZOBACTAM 4.5 G: 4; .5 INJECTION, POWDER, FOR SOLUTION INTRAVENOUS at 00:48

## 2024-06-08 RX ADMIN — PIPERACILLIN AND TAZOBACTAM 3.38 G: 3; .375 INJECTION, POWDER, FOR SOLUTION INTRAVENOUS at 06:36

## 2024-06-08 RX ADMIN — SODIUM CHLORIDE, POTASSIUM CHLORIDE, SODIUM LACTATE AND CALCIUM CHLORIDE: 600; 310; 30; 20 INJECTION, SOLUTION INTRAVENOUS at 04:14

## 2024-06-08 RX ADMIN — PIPERACILLIN AND TAZOBACTAM 3.38 G: 3; .375 INJECTION, POWDER, FOR SOLUTION INTRAVENOUS at 19:33

## 2024-06-08 RX ADMIN — SODIUM CHLORIDE, POTASSIUM CHLORIDE, SODIUM LACTATE AND CALCIUM CHLORIDE: 600; 310; 30; 20 INJECTION, SOLUTION INTRAVENOUS at 13:22

## 2024-06-08 RX ADMIN — NICOTINE 1 PATCH: 14 PATCH, EXTENDED RELEASE TRANSDERMAL at 08:46

## 2024-06-08 RX ADMIN — PIPERACILLIN AND TAZOBACTAM 3.38 G: 3; .375 INJECTION, POWDER, FOR SOLUTION INTRAVENOUS at 13:22

## 2024-06-08 NOTE — PROGRESS NOTES
Assessment & Plan     Sudden onset of severe abdominal pain    Patient states that yesterday she developed some abdominal discomfort.  Over the period of time from later morning she has developing severe and worsening abdominal pain LLQ  She has developed a fever as well      LLQ abdominal pain    This is likely diverticulitis but due to patient having severe pain and no established diagnosis of diverticulitis she is being sent to the ED for CT scan, pain control  Need to evaluate abdominal pain and rule out abscess or perforations    Fever, unspecified fever cause    Fever due to infection    BV (bacterial vaginosis)    Incidental finding  Will be treated at the ED      Nicotine/Tobacco Cessation  She reports that she has been smoking cigarettes. She has never used smokeless tobacco.  Nicotine/Tobacco Cessation Plan      CONSULTATION/REFERRAL to ED now for pain control, CT scan    No follow-ups on file.    Subjective   Manny is a 59 year old, presenting for the following health issues:  Urgent Care and Abdominal Pain (Lower abdominal pain, started yesterday and more constant today. Pain mostly on left side.)    HPI     Review of Systems  Constitutional, neuro, ENT, endocrine, pulmonary, cardiac, gastrointestinal, genitourinary, musculoskeletal, integument and psychiatric systems are negative, except as otherwise noted.      Objective    /80   Pulse 101   Temp 99.5  F (37.5  C) (Tympanic)   Resp 16   Wt 109.3 kg (241 lb)   SpO2 96%   BMI 37.75 kg/m    Body mass index is 37.75 kg/m .  Physical Exam   GENERAL: alert and no distress  EYES: Eyes grossly normal to inspection, PERRL and conjunctivae and sclerae normal  HENT: ear canals and TM's normal, nose and mouth without ulcers or lesions  NECK: no adenopathy, no asymmetry, masses, or scars  RESP: lungs clear to auscultation - no rales, rhonchi or wheezes  CV: regular rate and rhythm, normal S1 S2, no S3 or S4, no murmur, click or rub, no peripheral  edema  ABDOMEN: tenderness LLQ, severe pain, guarding  MS: no gross musculoskeletal defects noted, no edema  SKIN: no suspicious lesions or rashes  NEURO: Normal strength and tone, mentation intact and speech normal  PSYCH: mentation appears normal, affect normal/bright    Results for orders placed or performed in visit on 06/07/24   UA Macroscopic with reflex to Microscopic and Culture     Status: Abnormal    Specimen: Urine, Midstream   Result Value Ref Range    Color Urine Yellow Colorless, Straw, Light Yellow, Yellow    Appearance Urine Clear Clear    Glucose Urine Negative Negative mg/dL    Bilirubin Urine Moderate (A) Negative    Ketones Urine 40 (A) Negative mg/dL    Specific Gravity Urine >=1.030 1.003 - 1.035    Blood Urine Negative Negative    pH Urine 6.5 5.0 - 7.0    Protein Albumin Urine 100 (A) Negative mg/dL    Urobilinogen Urine 2.0 (A) 0.2, 1.0 E.U./dL    Nitrite Urine Negative Negative    Leukocyte Esterase Urine Negative Negative   UA Microscopic with Reflex to Culture     Status: Abnormal   Result Value Ref Range    Bacteria Urine Few (A) None Seen /HPF    RBC Urine 0-2 0-2 /HPF /HPF    WBC Urine 0-5 0-5 /HPF /HPF    Squamous Epithelials Urine Few (A) None Seen /LPF    Narrative    Urine Culture not indicated   Wet preparation     Status: Abnormal    Specimen: Vagina; Swab   Result Value Ref Range    Trichomonas Absent Absent    Yeast Absent Absent    Clue Cells Present (A) Absent    WBCs/high power field 2+ (A) None             Signed Electronically by: Kolton Logan, [facility], PA-C

## 2024-06-08 NOTE — PROGRESS NOTES
RECEIVING UNIT ED HANDOFF REVIEW    ED Nurse Handoff Report was reviewed by: Tong Montilla RN on June 8, 2024 at 2:53 AM

## 2024-06-08 NOTE — CONSULTS
Colon and Rectal Surgery Consultation         Eli Burton    MRN# [id number]   YOB: 1965 Age: 59 year old   Date of Admission: 6/7/2024  Date of Consult: 6/8/2024          Assessment and Plan:      This is a 59-year-old female with a past medical history of anxiety, obesity, hyperlipidemia who is presenting with concerns for abdominal pain, leukocytosis, CT scan concerning for acute sigmoid diverticulitis with microperforation.  At this time the patient's vitals are stable, her exam is reassuring.  Will plan for admission, IV antibiotics, bowel rest, monitoring of abdominal pain.  If her leukocytosis and pain are getting better, she can start advancing her diet.  She should stay on IV antibiotics until she is able to tolerate even a diet, at which point we will plan to transition to oral antibiotics for a total of 10 to 14 days.  Since she has not had a colonoscopy in the past, she will need a colonoscopy in 6 to 8 weeks.  At that time we will also plan to follow-up in clinic to discuss the management of diverticulitis.  Pending a normal colonoscopy, given that this is her first episode, she would likely not need any surgery for this in the future.  Patient verbalized understanding and is in agreement with the plan,     - Bowel rest. She can be on clears for now   - Pending improvement of pain and leukocytosis, can adat to low fiber diet   - Multimodal pain regimen   - Continue IV abx until tolerating ow fiber diet   - Colonoscopy in 6-8 weeks after discharge   - Follow up in clinic after that to discuss management of diverticulitis     Will discuss with Dr. Antoine.             Primary Care Physician:      Wei Ybarra [phone]         Requesting Physician:      MD Asad          Chief Complaint:      Abdominal pain  History is obtained from the patient.         History of Present Illness:      Eli Burton is a 59 year old female with past medical history of anxiety,  hyperlipidemia, obesity who is presenting with concerns for abdominal pain for the past day.  She has been having some diarrhea since 6/5.  Wednesdayshe started having some abdominal pain in the left lower quadrant which has been getting worse.  This prompted presentation to the emergency department for evaluation yesterday.  She has never had anything like this before.  The pain is in the left lower quadrant and does not radiate.  She denies any fevers, nausea, vomiting.  Last bowel movement was Wednesday.     In the emergency department, she was found to have normal vitals, leukocytosis of 16.  A CT scan was done which showed acute sigmoid diverticulitis with microperforation.  No drainable fluid collections.  Given these findings, she was admitted to the medical service and started on IV antibiotics.  Colorectal surgery was consulted for evaluation.    Denies ever having a colonoscopy.                Past Medical History:        Past Medical History:   Diagnosis Date    Anxiety     CTS (carpal tunnel syndrome) 7/20/2014    Depression     Headache     Hyperlipidemia LDL goal <130              Past Surgical History:        Past Surgical History:   Procedure Laterality Date    PARATHYROIDECTOMY N/A 12/23/2014    Procedure: PARATHYROIDECTOMY;  Surgeon: Anusha Chan MD;  Location: [facility] OR                 Home Medications:        Prior to Admission medications    Medication Sig Last Dose Taking? Auth Provider Long Term End Date   erythromycin (ROMYCIN) 5 MG/GM ophthalmic ointment Apply a small amount to the eyelids and lashes at bedtime each eye  Patient not taking: Reported on 7/9/2023   [place], YOSEF     methocarbamol (ROBAXIN) 500 MG tablet Take 1 tablet (500 mg) by mouth 4 times daily as needed for muscle spasms   Kimberly Posey PA-C     methylPREDNISolone (MEDROL DOSEPAK) 4 MG tablet therapy pack Follow Package Directions  Patient not taking: Reported on 7/18/2023   Dipti Singleton, VANESSA  CNP     sertraline (ZOLOFT) 100 MG tablet TAKE ONE TABLET BY MOUTH ONCE DAILY   Wei Ybarra PA-C Yes    tiZANidine (ZANAFLEX) 2 MG tablet Take 1 tablet (2 mg) by mouth 3 times daily as needed for muscle spasms  Patient not taking: Reported on 7/18/2023   Dipti Singleton APRN CNP              Current Medications:         Current Facility-Administered Medications   Medication Dose Route Frequency Provider Last Rate Last Admin    enoxaparin ANTICOAGULANT (LOVENOX) injection 40 mg  40 mg Subcutaneous Q24H Landon Kamara MD   40 mg at 06/08/24 0846    nicotine (NICODERM CQ) 14 MG/24HR 24 hr patch 1 patch  1 patch Transdermal Daily Landon Kamara MD   1 patch at 06/08/24 0846    piperacillin-tazobactam (ZOSYN) 3.375 g vial to attach to  mL bag  3.375 g Intravenous Q6H Landon Kamara MD   3.375 g at 06/08/24 0636    sodium chloride (PF) 0.9% PF flush 3 mL  3 mL Intracatheter Q8H Landon Kamara MD   3 mL at 06/08/24 0414             Allergies:   No Known Allergies         Social History:        Social History     Tobacco Use    Smoking status: Every Day     Current packs/day: 0.50     Types: Cigarettes    Smokeless tobacco: Never   Substance Use Topics    Alcohol use: Yes     Alcohol/week: 0.0 standard drinks of alcohol     Comment: 0-10 light beers weekly             Family History:        Family History   Problem Relation Age of Onset    Breast Cancer Maternal Grandmother     Cancer - colorectal Maternal Grandfather     Heart Surgery Sister         heart attack    Cerebrovascular Disease No family hx of              Review of Systems:        The 10 point Review of Systems is negative other than noted in the HPI.            Physical Exam:      Blood pressure 126/80, pulse 80, temperature 99.4  F (37.4  C), temperature source Oral, resp. rate 18, SpO2 95%, not currently breastfeeding.  There were no vitals filed for this visit.  Vital Sign Ranges  Temperature  Temp  Av  F (37.2  C)  Min: 98  F (36.7  C)  Max: 99.5  F (37.5  C)   Blood pressure Systolic (24hrs), Av , Min:118 , Max:145        Diastolic (24hrs), Av, Min:67, Max:94      Pulse Pulse  Av.4  Min: 80  Max: 101   Respirations Resp  Av.8  Min: 16  Max: 19   Pulse oximetry SpO2  Av.7 %  Min: 94 %  Max: 97 %         Intake/Output Summary (Last 24 hours) at 2024 0853  Last data filed at 2024 0700  Gross per 24 hour   Intake 445 ml   Output --   Net 445 ml     General: No acute distress  Cardiovascular: Not tachycardic  Pulmonary: No acute distress, breathing unlabored  Abdomen: Soft, nondistended, tender to palpation in the mid lower abdomen.  No peritonitis, no rigidity.         Data:      All new lab and imaging data was reviewed.   Recent Labs   Lab Test 24  1108 19  0757 17  1549 17  1459   WBC 16.4* 7.6 7.4 7.4 7.5   HGB 15.4 16.4* 16.5* 16.0* 16.6*    264 265 220 233   INR  --   --   --  1.03 1.00      Recent Labs   Lab Test 24  1108 19  0757    140 138   POTASSIUM 4.0 3.9 4.4   CHLORIDE 100 108 107   CO2 24 26 26   BUN 9.3 14 16   CR 0.71 0.65 0.78   ANIONGAP 12 6 5   NILO 8.8 8.5 8.9   * 95 93       Vangie Presley MD  [facility]

## 2024-06-08 NOTE — H&P
[facility]    History and Physical - Hospitalist Service       Date of Admission:  6/7/2024    Assessment & Plan   Eli Burton is a 59 year old female with no pertinent past medical history who presents with abdominal pain. Admitted on 6/7/2024.     Acute sigmoid diverticulitis with microperforation   *Presents with left-lower quadrant pain   *WBC 16.4. Afebrile. Exam non-surgical at this time.   *CT abd/pelvis with acute sigmoid diverticulitis with microperforation, no abscess; secondary inflammation of the urinary bladder and left adnexa  *Denies prior colonoscopy  - NPO  - Colorectal surgery consult  - IVF  - Piperacillin-tazobactam IV   - Ondansetron IV/PO, prochlorperazine IV/PO PRN   - Oxycodone PO, hydromorphone IV PRN   - Trend fever curve, WBC count  - Discussed recommendation for colonoscopy in 6-8 weeks when acute illness resolves    Mild inflammatory infiltrate at lung bases  Noted incidentally on CT. Likely secondary to intra-abdominal inflammation. On room air, denies respiratory symptoms.   - Monitor respiratory symptoms    Depression  Anxiety  - Resume prior to admission sertraline when dose confirmed by pharmacy  - Lorazepam PO PRN for increased anxiety over illness/hospitalization       Tobacco use disorder  - Encourage cessation  - Nicotine replacement ordered       Diet: NPO  DVT Prophylaxis: Enoxaparin (Lovenox) SQ  Medel Catheter: Not present  Code Status: Full code     Disposition Plan   Admit to inpatient. Anticipate greater than or equal to 2 midnights prior to discharge.     Entered: Landon Kamara MD 06/08/2024, 1:00 AM     The patient's care was discussed with the ED provider, patient     Medical Decision Making       75 MINUTES SPENT BY ME on the date of service doing chart review, history, exam, documentation & further activities per the note.      Clinically Significant Risk Factors Present on Admission                                              Landon  MIRNA Kamara MD  [facility]    ______________________________________________________________________    Chief Complaint   Abdominal pain    History of Present Illness   Eli Burton is a 59 year old female who presents with the above chief complaint.    History is obtained from the patient, discussion with ED provider and review of medical record. The patient reports on 6/5 she had an episode of diarrhea and took some Pepto-Bismol. She subsequently developed sharp, aching abdominal pain located in the LLQ, which became constant and increasingly more severe. She reports some chills 6/7/24. Denies any nausea/vomiting. She presented to urgent care initially and was referred to the ED for further evaluation.  She denies any prior history of diverticulitis.  She denies prior colonoscopy.    In the Emergency Department, the patient was seen by Dr. Prieto, with whom I discussed the patient's presenting symptoms and emergency department course.  Initial vital signs were a temperature of 98F, HR 86, /67, RR 18, SpO2 97% on room air. Pertinent work-up as noted in A&P. The patient received piperacillin-tazobactam IV and Hospitalists were contacted for admission to the hospital.     Past Medical History    I have reviewed this patient's medical history and updated it with pertinent information if needed.   Past Medical History:   Diagnosis Date    Anxiety     CTS (carpal tunnel syndrome) 7/20/2014    Depression     Headache     Hyperlipidemia LDL goal <130        Past Surgical History   I have reviewed this patient's surgical history and updated it with pertinent information if needed.  Past Surgical History:   Procedure Laterality Date    PARATHYROIDECTOMY N/A 12/23/2014    Procedure: PARATHYROIDECTOMY;  Surgeon: Anusha Chan MD;  Location:  OR         Social History   I have reviewed this patient's social history and updated it with pertinent information if needed.  Social  History     Tobacco Use    Smoking status: Every Day     Current packs/day: 0.50     Types: Cigarettes    Smokeless tobacco: Never   Substance Use Topics    Alcohol use: Yes     Alcohol/week: 0.0 standard drinks of alcohol     Comment: 0-10 light beers weekly    Drug use: Yes     Types: Marijuana          Family History   I have reviewed this patient's family history and updated it with pertinent information if needed.   Family History   Problem Relation Age of Onset    Breast Cancer Maternal Grandmother     Cancer - colorectal Maternal Grandfather     Heart Surgery Sister         heart attack    Cerebrovascular Disease No family hx of         Prior to Admission Medications  - Pending pharmacy reconciliation   Prior to Admission Medications   Prescriptions Last Dose Informant Patient Reported? Taking?   erythromycin (ROMYCIN) 5 MG/GM ophthalmic ointment   No No   Sig: Apply a small amount to the eyelids and lashes at bedtime each eye   Patient not taking: Reported on 7/9/2023   methocarbamol (ROBAXIN) 500 MG tablet   No No   Sig: Take 1 tablet (500 mg) by mouth 4 times daily as needed for muscle spasms   methylPREDNISolone (MEDROL DOSEPAK) 4 MG tablet therapy pack   No No   Sig: Follow Package Directions   Patient not taking: Reported on 7/18/2023   sertraline (ZOLOFT) 100 MG tablet   No No   Sig: TAKE ONE TABLET BY MOUTH ONCE DAILY   tiZANidine (ZANAFLEX) 2 MG tablet   No No   Sig: Take 1 tablet (2 mg) by mouth 3 times daily as needed for muscle spasms   Patient not taking: Reported on 7/18/2023      Facility-Administered Medications: None     Allergies   No Known Allergies    Physical Exam   Vital Signs: Temp: 98  F (36.7  C) Temp src: Temporal BP: (!) 141/79 Pulse: 88   Resp: 18 SpO2: 97 % O2 Device: None (Room air)    Weight: 0 lbs 0 oz    Constitutional: NAD  Respiratory: Clear to auscultation bilaterally, good air movement, normal effort   Cardiovascular: RRR, no m/r/g. No peripheral edema.   GI: Soft, tender  to palpation in LLQ without rebound tenderness or guarding.   Skin: Warm, dry   Neurologic: Alert. Responding to questions appropriately. Following commands.   Psychiatric: Normal affect, appropriate      Data   Data reviewed today: I reviewed all medications, new labs and imaging results over the last 24 hours. I personally reviewed no images or EKG's today.    Recent Labs   Lab 06/07/24 2013   WBC 16.4*   HGB 15.4   MCV 92         POTASSIUM 4.0   CHLORIDE 100   CO2 24   BUN 9.3   CR 0.71   ANIONGAP 12   NILO 8.8   *   ALBUMIN 4.0   PROTTOTAL 7.5   BILITOTAL 0.7   ALKPHOS 70   ALT 7   AST 18       Recent Results (from the past 24 hour(s))   CT Abdomen Pelvis w Contrast    Narrative    EXAM: CT ABDOMEN PELVIS W CONTRAST  LOCATION: [facility]  DATE: 6/8/2024    INDICATION: LLQ pain  COMPARISON: Abdominal ultrasound 4/6/2017.  TECHNIQUE: CT scan of the abdomen and pelvis was performed following injection of IV contrast. Multiplanar reformats were obtained. Dose reduction techniques were used.  CONTRAST: 121 mL Isovue 370    FINDINGS:   LOWER CHEST: Mild nodular infiltrate at the bases of the included lower lobes has an inflammatory appearance.    HEPATOBILIARY: Normal.    PANCREAS: Normal.    SPLEEN: Normal.    ADRENAL GLANDS: Normal.    KIDNEYS/BLADDER: Secondary wall thickening and inflammation of the urinary bladder due to sigmoid diverticulitis discussed below. No gas in the bladder. Four nonobstructing left intrarenal stones measuring 3-4 mm in size. Tiny benign cysts of the kidneys   not warranting follow-up.    BOWEL: Colonic diverticulosis. Focal wall thickening and marked inflammatory stranding of the mid sigmoid colon centered on a diverticulum. A few tiny locules of extraluminal gas in the nearby fat. Trace layering fluid in the pelvis. No abscess.   Secondary inflammation of the nearby urinary bladder and left adnexa. No bowel obstruction. Normal  appendix.    LYMPH NODES: Normal.    VASCULATURE: Mild to moderate aortoiliac atherosclerosis. No aneurysm.    PELVIC ORGANS: Normal.    MUSCULOSKELETAL: Degenerative changes of the spine.      Impression    IMPRESSION:   1.  Acute sigmoid diverticulitis complicated by microperforation. No abscess.  2.  Secondary inflammation of the urinary bladder and left adnexa.  3.  Nonobstructive nephrolithiasis on the left.  4.  Mild inflammatory infiltrate at the bases of the lower lobes.

## 2024-06-08 NOTE — PLAN OF CARE
Goal Outcome Evaluation:      Pt alert and orientated x 4 denies abdominal pain this shift to present time did have temp 99.4 this morning and headache relieved with tylenol.prn Ivf patent voiding tolerating clear liquids denies nausea or emesis  abdomen soft pt reports some distention bowel sounds and flatus. Potassium 4.2 flag for needing a weight NA will obtain

## 2024-06-08 NOTE — PROGRESS NOTES
Hospitalist progress note;    No charge note today.  Patient seen and examined discussed with bedside RN.  Patient got admitted with acute diverticulitis with a microperforation with no abscess.  Colorectal surgery consultation requested.  Patient okay to start clear liquid diet.  Continue IV Zosyn and IV fluids for now    Continue current cares    Shana Barlow MD   Page [phone](7AM-6PM)

## 2024-06-08 NOTE — PLAN OF CARE
Goal Outcome Evaluation:    DATE & TIME: 0300-0730   Cognitive Concerns/ Orientation : alert and oriented x4  BEHAVIOR & AGGRESSION TOOL COLOR: green  ABNL VS/O2: VSS expect elevated BP, on room air  MOBILITY: Ind  PAIN MANAGMENT: abdominal discomfort but no intervention requested  DIET: NPO  BOWEL/BLADDER: up to the bathroom independently   ABNL LAB/BG: see results  DRAIN/DEVICES: LR infusing at 125ml/hr  SKIN: WDL  TESTS/PROCEDURES: pending  D/C DATE: pending

## 2024-06-08 NOTE — ED TRIAGE NOTES
"Pt arrives through triage c/o LLQ abd pain since Wednesday night, last BM this AM small and \"darker\", no chance of pregnancy, no pain or frequency w/ urination, ABCD intact.       Triage Assessment (Adult)       Row Name 06/07/24 2001          Triage Assessment    Airway WDL WDL        Respiratory WDL    Respiratory WDL WDL        Skin Circulation/Temperature WDL    Skin Circulation/Temperature WDL WDL        Cardiac WDL    Cardiac WDL WDL        Peripheral/Neurovascular WDL    Peripheral Neurovascular WDL WDL        Cognitive/Neuro/Behavioral WDL    Cognitive/Neuro/Behavioral WDL WDL                     "

## 2024-06-08 NOTE — PHARMACY-ADMISSION MEDICATION HISTORY
Pharmacist Admission Medication History    Admission medication history is complete. The information provided in this note is only as accurate as the sources available at the time of the update.    Information Source(s): Patient via in-person    Pertinent Information: None    Changes made to PTA medication list:  Added: None  Deleted: Erythromycin eye drops, robaxin, medrol dose [place], zanaflex, : Hasn't taken for a year  Changed: Zoloft to 50mg but Rx written for 100mg. Pt was running out of meds.    Allergies reviewed with patient and updates made in EHR: no    Medication History Completed By: Mei Moulton RPH 6/8/2024 9:59 AM    PTA Med List   Medication Sig Note Last Dose    sertraline (ZOLOFT) 50 MG tablet Take 50 mg by mouth daily 6/8/2024: Prescription for Zoloft is for 100mg daily but was running out so took 50mg daily Past Week

## 2024-06-08 NOTE — ED PROVIDER NOTES
Emergency Department Note      History of Present Illness     Chief Complaint  Abdominal Pain    HPI  Eli Burton is a 59 year old female who presents with lower abdominal pain greatest in the left lower quadrant for the last 3 days which has been worsening.  Symptoms initially started with some diarrhea for which she took Pepto-Bismol.  Pain increased since then and she notes decreased appetite.  No vomiting or fever.  No history of similar symptoms in the past.  She denies any urinary symptoms, dysuria frequency or urgency.  No hematuria.  No vaginal bleeding or pelvic symptoms.  No chest pain or shortness of breath.  No prior abdominal surgery.  She denies any other symptoms at this time.    Independent Historian  None    Review of External Notes  None  Past Medical History   Medical History and Problem List  Past Medical History:   Diagnosis Date    Anxiety     CTS (carpal tunnel syndrome) 7/20/2014    Depression     Headache     Hyperlipidemia LDL goal <130        Medications  No current outpatient medications on file.      Surgical History   Past Surgical History:   Procedure Laterality Date    PARATHYROIDECTOMY N/A 12/23/2014    Procedure: PARATHYROIDECTOMY;  Surgeon: Anusha Chan MD;  Location: [facility] OR     Physical Exam   Patient Vitals for the past 24 hrs:   BP Temp Temp src Pulse Resp SpO2   06/08/24 0700 126/80 99.4  F (37.4  C) Oral 80 18 95 %   06/08/24 0308 (!) 118/94 99.2  F (37.3  C) Oral 92 19 96 %   06/08/24 0202 -- -- -- -- -- 95 %   06/08/24 0132 -- -- -- -- -- 94 %   06/08/24 0102 -- -- -- -- -- 95 %   06/08/24 0032 -- -- -- -- -- 96 %   06/08/24 0020 -- -- -- -- -- 97 %   06/08/24 0019 (!) 141/79 -- -- 88 -- --   06/07/24 2003 (!) 145/67 98  F (36.7  C) Temporal 86 18 97 %     Physical Exam  General: Well appearing, nontoxic. Resting comfortably  Head:  Scalp, face, and head appear normal  Eyes:  Pupils are equal, round    Conjunctivae non-injected and sclerae white  ENT:    The  external nose is normal    Pinnae are normal  Neck:  Normal range of motion    There is no rigidity noted    Trachea is in the midline  CV:  Regular rate and rhythm     Normal S1/S2, no S3/S4    No murmur or rub. Radial pulses 2+ bilaterally.  Resp:  Lungs are clear and equal bilaterally  There is no tachypnea    No increased work of breathing    No rales, wheezing, or rhonchi  GI:  Abdomen is soft, no rigidity or guarding    No distension, or mass    Diffuse lower abdominal tenderness greatest in LLQ. No rebound tenderness   MS:  Normal muscular tone  Skin:  No rash or acute skin lesions noted  Neuro:  Awake and alert  Speech is normal and fluent  Moves all extremities spontaneously  Psych: Normal affect. Appropriate interactions.     Diagnostics   Lab Results   Labs Ordered and Resulted from Time of ED Arrival to Time of ED Departure   COMPREHENSIVE METABOLIC PANEL - Abnormal       Result Value    Sodium 136      Potassium 4.0      Carbon Dioxide (CO2) 24      Anion Gap 12      Urea Nitrogen 9.3      Creatinine 0.71      GFR Estimate >90      Calcium 8.8      Chloride 100      Glucose 103 (*)     Alkaline Phosphatase 70      AST 18      ALT 7      Protein Total 7.5      Albumin 4.0      Bilirubin Total 0.7     CBC WITH PLATELETS AND DIFFERENTIAL - Abnormal    WBC Count 16.4 (*)     RBC Count 5.05      Hemoglobin 15.4      Hematocrit 46.4      MCV 92      MCH 30.5      MCHC 33.2      RDW 12.7      Platelet Count 259      % Neutrophils 81      % Lymphocytes 11      % Monocytes 8      % Eosinophils 0      % Basophils 0      % Immature Granulocytes 0      NRBCs per 100 WBC 0      Absolute Neutrophils 13.3 (*)     Absolute Lymphocytes 1.7      Absolute Monocytes 1.3      Absolute Eosinophils 0.1      Absolute Basophils 0.0      Absolute Immature Granulocytes 0.1      Absolute NRBCs 0.0     ROUTINE UA WITH MICROSCOPIC REFLEX TO CULTURE - Abnormal    Color Urine Yellow      Appearance Urine Clear      Glucose Urine  Negative      Bilirubin Urine Negative      Ketones Urine 20 (*)     Specific Gravity Urine 1.010      Blood Urine Negative      pH Urine 6.5      Protein Albumin Urine 10 (*)     Urobilinogen Urine Normal      Nitrite Urine Negative      Leukocyte Esterase Urine Negative      Mucus Urine Present (*)     RBC Urine 2      WBC Urine 1      Squamous Epithelials Urine 1     LIPASE - Normal    Lipase 31         Imaging  CT Abdomen Pelvis w Contrast   Final Result   IMPRESSION:    1.  Acute sigmoid diverticulitis complicated by microperforation. No abscess.   2.  Secondary inflammation of the urinary bladder and left adnexa.   3.  Nonobstructive nephrolithiasis on the left.   4.  Mild inflammatory infiltrate at the bases of the lower lobes.            Independent Interpretation  None  ED Course    Medications Administered  Medications   piperacillin-tazobactam (ZOSYN) 3.375 g vial to attach to  mL bag (3.375 g Intravenous $New Bag 6/8/24 1322)   lidocaine 1 % 0.1-1 mL (has no administration in time range)   lidocaine (LMX4) cream (has no administration in time range)   sodium chloride (PF) 0.9% PF flush 3 mL (3 mLs Intracatheter Not Given 6/8/24 1100)   sodium chloride (PF) 0.9% PF flush 3 mL (has no administration in time range)   hydrALAZINE (APRESOLINE) tablet 10 mg (has no administration in time range)     Or   hydrALAZINE (APRESOLINE) injection 10 mg (has no administration in time range)   acetaminophen (TYLENOL) tablet 650 mg (650 mg Oral $Given 6/8/24 0855)     Or   acetaminophen (TYLENOL) Suppository 650 mg ( Rectal See Alternative 6/8/24 0855)   ondansetron (ZOFRAN ODT) ODT tab 4 mg (has no administration in time range)     Or   ondansetron (ZOFRAN) injection 4 mg (has no administration in time range)   benzocaine-menthol (CHLORASEPTIC) 6-10 MG lozenge 1 lozenge (has no administration in time range)   enoxaparin ANTICOAGULANT (LOVENOX) injection 40 mg (40 mg Subcutaneous $Given 6/8/24 0846)   lactated  ringers infusion ( Intravenous $New Bag 6/8/24 1322)   HYDROmorphone (DILAUDID) injection 0.2 mg (has no administration in time range)   HYDROmorphone (DILAUDID) injection 0.4 mg (has no administration in time range)   oxyCODONE IR (ROXICODONE) half-tab 2.5 mg (has no administration in time range)   oxyCODONE (ROXICODONE) tablet 5 mg (has no administration in time range)   prochlorperazine (COMPAZINE) injection 10 mg (has no administration in time range)     Or   prochlorperazine (COMPAZINE) tablet 10 mg (has no administration in time range)     Or   prochlorperazine (COMPAZINE) suppository 25 mg (has no administration in time range)   naloxone (NARCAN) injection 0.2 mg (has no administration in time range)     Or   naloxone (NARCAN) injection 0.4 mg (has no administration in time range)     Or   naloxone (NARCAN) injection 0.2 mg (has no administration in time range)     Or   naloxone (NARCAN) injection 0.4 mg (has no administration in time range)   nicotine (NICODERM CQ) 14 MG/24HR 24 hr patch 1 patch (1 patch Transdermal $Patch/Med Applied 6/8/24 0846)   LORazepam (ATIVAN) tablet 0.5-1 mg (has no administration in time range)   iopamidol (ISOVUE-370) solution 121 mL (121 mLs Intravenous $Given 6/7/24 6475)   Saline Flush (75 mLs Intravenous $Given 6/7/24 2356)   piperacillin-tazobactam (ZOSYN) 4.5 g vial to attach to  mL bag (0 g Intravenous Stopped 6/8/24 0202)       Procedures  Procedures     Discussion of Management  See below     Social Determinants of Health adding to complexity of care  None    ED Course  ED Course as of 06/08/24 1410   Sat Jun 08, 2024   0034 Patient seen and evaluated    0100 Case discussed with hospitalist, Dr. Kamara, who accepts the patient for admission to the hospital.     Medical Decision Making / Diagnosis   CMS Diagnoses: None    MIPS     None    MDM  Eli Burton is a 59 year old female who presents with lower abdominal pain ED evaluation is consistent with  complicated diverticulitis with microperforation and tiny bubbles of extraluminal air.  No abscess.  No evidence of suzi peritonitis or acute surgical emergency at this time.  No sepsis or severe systemic infection at this time.  No other acute intra-abdominal findings that would otherwise explain her symptoms are seen.  CBC with leukocytosis consistent with diverticulitis.  No anemia.  Lipase is normal.  LFTs and renal function are normal.  UA without evidence of UTI or other significant abnormality.  Patient was treated with Zosyn and will be admitted to the hospital for ongoing close monitoring evaluation and treatment.  She would benefit from nonemergent colorectal surgery consultation.  The case was discussed with the hospitalist and the patient was admitted in stable condition.      Disposition  The patient was admitted to the hospital.     ICD-10 Codes:    ICD-10-CM    1. Diverticulitis of large intestine with perforation without bleeding  K57.20            Discharge Medications  Current Discharge Medication List            MD Ida Becerra Ryan Clay, MD  06/08/24 8627

## 2024-06-09 LAB
ERYTHROCYTE [DISTWIDTH] IN BLOOD BY AUTOMATED COUNT: 12.6 % (ref 10–15)
HCT VFR BLD AUTO: 44.9 % (ref 35–47)
HGB BLD-MCNC: 14.6 G/DL (ref 11.7–15.7)
MCH RBC QN AUTO: 31 PG (ref 26.5–33)
MCHC RBC AUTO-ENTMCNC: 32.5 G/DL (ref 31.5–36.5)
MCV RBC AUTO: 95 FL (ref 78–100)
PLATELET # BLD AUTO: 284 10E3/UL (ref 150–450)
POTASSIUM SERPL-SCNC: 4.2 MMOL/L (ref 3.4–5.3)
RBC # BLD AUTO: 4.71 10E6/UL (ref 3.8–5.2)
WBC # BLD AUTO: 8.7 10E3/UL (ref 4–11)

## 2024-06-09 PROCEDURE — 85014 HEMATOCRIT: CPT | Performed by: INTERNAL MEDICINE

## 2024-06-09 PROCEDURE — 84132 ASSAY OF SERUM POTASSIUM: CPT | Performed by: INTERNAL MEDICINE

## 2024-06-09 PROCEDURE — 250N000011 HC RX IP 250 OP 636: Mod: JZ | Performed by: INTERNAL MEDICINE

## 2024-06-09 PROCEDURE — 258N000003 HC RX IP 258 OP 636: Mod: JZ | Performed by: INTERNAL MEDICINE

## 2024-06-09 PROCEDURE — 120N000001 HC R&B MED SURG/OB

## 2024-06-09 PROCEDURE — 250N000013 HC RX MED GY IP 250 OP 250 PS 637: Performed by: INTERNAL MEDICINE

## 2024-06-09 PROCEDURE — 99233 SBSQ HOSP IP/OBS HIGH 50: CPT | Performed by: INTERNAL MEDICINE

## 2024-06-09 PROCEDURE — 36415 COLL VENOUS BLD VENIPUNCTURE: CPT | Performed by: INTERNAL MEDICINE

## 2024-06-09 RX ADMIN — ENOXAPARIN SODIUM 40 MG: 40 INJECTION SUBCUTANEOUS at 07:49

## 2024-06-09 RX ADMIN — PIPERACILLIN AND TAZOBACTAM 3.38 G: 3; .375 INJECTION, POWDER, FOR SOLUTION INTRAVENOUS at 00:59

## 2024-06-09 RX ADMIN — PIPERACILLIN AND TAZOBACTAM 3.38 G: 3; .375 INJECTION, POWDER, FOR SOLUTION INTRAVENOUS at 07:49

## 2024-06-09 RX ADMIN — NICOTINE 1 PATCH: 14 PATCH, EXTENDED RELEASE TRANSDERMAL at 08:23

## 2024-06-09 RX ADMIN — PIPERACILLIN AND TAZOBACTAM 3.38 G: 3; .375 INJECTION, POWDER, FOR SOLUTION INTRAVENOUS at 13:23

## 2024-06-09 RX ADMIN — SODIUM CHLORIDE, POTASSIUM CHLORIDE, SODIUM LACTATE AND CALCIUM CHLORIDE: 600; 310; 30; 20 INJECTION, SOLUTION INTRAVENOUS at 11:37

## 2024-06-09 RX ADMIN — SODIUM CHLORIDE, POTASSIUM CHLORIDE, SODIUM LACTATE AND CALCIUM CHLORIDE: 600; 310; 30; 20 INJECTION, SOLUTION INTRAVENOUS at 01:54

## 2024-06-09 RX ADMIN — PIPERACILLIN AND TAZOBACTAM 3.38 G: 3; .375 INJECTION, POWDER, FOR SOLUTION INTRAVENOUS at 18:14

## 2024-06-09 RX ADMIN — ACETAMINOPHEN 650 MG: 325 TABLET, FILM COATED ORAL at 11:41

## 2024-06-09 NOTE — PROGRESS NOTES
Shift Summary [id number]    Admitting Diagnosis: Diverticulitis of large intestine with perforation without bleeding     Patient A&Ox4. VSS, on RA. Denied pain, nausea. Watery stools on this shift. Pt on Full Liquids and Tolerated well. PIV SL. With intermittent abx. Up independent. Will continue plan of care.

## 2024-06-09 NOTE — PROGRESS NOTES
"[facility]    Medicine Progress Note - Hospitalist Service    Date of Admission:  6/7/2024    Assessment & Plan   Eli Burton is a 59 year old female with no pertinent past medical history who presents with abdominal pain. Admitted on 6/7/2024.      Acute sigmoid diverticulitis with microperforation   *Presents with left-lower quadrant pain   *WBC 16.4. Afebrile. Exam non-surgical at this time.   *CT abd/pelvis with acute sigmoid diverticulitis with microperforation, no abscess; secondary inflammation of the urinary bladder and left adnexa  *Denies prior colonoscopy  Tolerating clear liquid diet.  Advance to full liquids per colorectal surgery  WBC count gradually improving  - Piperacillin-tazobactam IV   - Ondansetron IV/PO, prochlorperazine IV/PO PRN   - Oxycodone PO, hydromorphone IV PRN   - Trend fever curve, WBC count  - Discussed recommendation for colonoscopy in 6-8 weeks when acute illness resolves    Continue IV Zosyn today plan on discharge tomorrow to home     Mild inflammatory infiltrate at lung bases  Noted incidentally on CT. Likely secondary to intra-abdominal inflammation. On room air, denies respiratory symptoms.   - Monitor respiratory symptoms     Depression  Anxiety  - Resume prior to admission sertraline when dose confirmed by pharmacy  - Lorazepam PO PRN for increased anxiety over illness/hospitalization        Tobacco use disorder  - Encourage cessation  - Nicotine replacement ordered                  Diet: Full Liquid Diet    DVT Prophylaxis: Ambulate every shift  Medel Catheter: Not present  Lines: None     Cardiac Monitoring: None  Code Status: Full Code      Clinically Significant Risk Factors                               # Obesity: Estimated body mass index is 36.49 kg/m  as calculated from the following:    Height as of this encounter: 1.727 m (5' 8\").    Weight as of this encounter: 108.9 kg (240 lb)., PRESENT ON ADMISSION            Disposition Plan "     Medically Ready for Discharge: Anticipated Tomorrow       Discussed with bedside RN patient      Shana Barlow MD  Hospitalist Service  [facility]  Securely message with Maur[employer] (more info)  Text page via [employer] Paging/Directory   ______________________________________________________________________    Interval History   Patient is feeling much better.  Abdominal pain improving.  Tolerating clears.  Diet today advance to full liquids.  Continue to closely monitor today and continue IV antibiotics with plan on discharge to home tomorrow.  Patient is agreeable    Physical Exam   Vital Signs: Temp: 99.3  F (37.4  C) Temp src: Oral BP: (!) 141/86 Pulse: 82   Resp: 18 SpO2: 94 % O2 Device: None (Room air)    Weight: 240 lbs 0 oz    General Appearance: Alert awake, not in acute distress  Respiratory: Clear to auscultation bilaterally  Cardiovascular: Normal rate rhythm regular  GI: Soft, nondistended, lower abdominal tenderness mild.  No guarding rigidity or rebound noted  Skin: Warm and dry  Other:      Medical Decision Making       50 MINUTES SPENT BY ME on the date of service doing chart review, history, exam, documentation & further activities per the note.      Data     I have personally reviewed the following data over the past 24 hrs:    8.7  \   14.6   / 284     N/A N/A N/A /  N/A   4.2 N/A N/A \       Imaging results reviewed over the past 24 hrs:   No results found for this or any previous visit (from the past 24 hour(s)).

## 2024-06-09 NOTE — PLAN OF CARE
Goal Outcome Evaluation:      Plan of Care Reviewed With: patient    Overall Patient Progress: improvingOverall Patient Progress: improving    A&Ox4. VSS on RA. Denies pain. Tolerating clear liquid diet. PIV infusing LR @125ml/hr with int abx. Hypo BS, passing flatus, loose stool x2. Void adequately. Up independently. Discharge pending.

## 2024-06-09 NOTE — PLAN OF CARE
Goal Outcome Evaluation:      Plan of Care Reviewed With: patient    Overall Patient Progress: improvingOverall Patient Progress: improving         Date & Time: 6/9/2024   11p-7a  Surgery/POD#: acute diverticulitis with a microperforation with no abscess.  Behavior & Aggression: Green  Fall Risk: No  Orientation:AOx4  ABNL VS/O2:VSS on RA  ABNL Labs: WBC 14.4  Pain Management: Denies  Bowel/Bladder: BM x1 watery stool. Voiding in BR  Drains: N/A  Wounds/incisions: N/A  Diet:Clear liquid  Activity Level: ind   Tests/Procedures: N/A  Anticipated  DC Date: Pending  Significant Information: Int Abx. PIV LR 125mL/hr. K protocol.

## 2024-06-10 VITALS
HEART RATE: 67 BPM | HEIGHT: 68 IN | TEMPERATURE: 98.8 F | WEIGHT: 240 LBS | OXYGEN SATURATION: 94 % | SYSTOLIC BLOOD PRESSURE: 132 MMHG | BODY MASS INDEX: 36.37 KG/M2 | RESPIRATION RATE: 18 BRPM | DIASTOLIC BLOOD PRESSURE: 80 MMHG

## 2024-06-10 PROCEDURE — 99239 HOSP IP/OBS DSCHRG MGMT >30: CPT | Performed by: INTERNAL MEDICINE

## 2024-06-10 PROCEDURE — 250N000011 HC RX IP 250 OP 636: Mod: JZ | Performed by: INTERNAL MEDICINE

## 2024-06-10 PROCEDURE — 250N000013 HC RX MED GY IP 250 OP 250 PS 637: Performed by: INTERNAL MEDICINE

## 2024-06-10 RX ORDER — ACETAMINOPHEN 325 MG/1
650 TABLET ORAL EVERY 6 HOURS PRN
COMMUNITY
Start: 2024-06-10 | End: 2024-07-29

## 2024-06-10 RX ADMIN — NICOTINE 1 PATCH: 14 PATCH, EXTENDED RELEASE TRANSDERMAL at 08:39

## 2024-06-10 RX ADMIN — AMOXICILLIN AND CLAVULANATE POTASSIUM 1 TABLET: 875; 125 TABLET, FILM COATED ORAL at 11:54

## 2024-06-10 RX ADMIN — PIPERACILLIN AND TAZOBACTAM 3.38 G: 3; .375 INJECTION, POWDER, FOR SOLUTION INTRAVENOUS at 06:34

## 2024-06-10 RX ADMIN — PIPERACILLIN AND TAZOBACTAM 3.38 G: 3; .375 INJECTION, POWDER, FOR SOLUTION INTRAVENOUS at 00:48

## 2024-06-10 ASSESSMENT — ACTIVITIES OF DAILY LIVING (ADL)
HEARING_DIFFICULTY_OR_DEAF: NO
ADLS_ACUITY_SCORE: 18
ADLS_ACUITY_SCORE: 18
DIFFICULTY_COMMUNICATING: NO
ADLS_ACUITY_SCORE: 18
ADLS_ACUITY_SCORE: 35
FALL_HISTORY_WITHIN_LAST_SIX_MONTHS: NO
ADLS_ACUITY_SCORE: 35
ADLS_ACUITY_SCORE: 35
ADLS_ACUITY_SCORE: 18
CONCENTRATING,_REMEMBERING_OR_MAKING_DECISIONS_DIFFICULTY: NO
ADLS_ACUITY_SCORE: 18
DIFFICULTY_EATING/SWALLOWING: NO
ADLS_ACUITY_SCORE: 35
DRESSING/BATHING_DIFFICULTY: NO
WALKING_OR_CLIMBING_STAIRS_DIFFICULTY: NO
ADLS_ACUITY_SCORE: 18
ADLS_ACUITY_SCORE: 35
TOILETING_ISSUES: NO
WEAR_GLASSES_OR_BLIND: NO
DOING_ERRANDS_INDEPENDENTLY_DIFFICULTY: NO

## 2024-06-10 NOTE — DISCHARGE SUMMARY
"[facility]  Hospitalist Discharge Summary      Date of Admission:  6/7/2024  Date of Discharge:  6/10/2024  Discharging Provider: Shana Barlow MD  Discharge Service: Hospitalist Service    Discharge Diagnoses   Acute diverticulitis with microperforation no abscess seen    Please see hospital course    Clinically Significant Risk Factors     # Obesity: Estimated body mass index is 36.49 kg/m  as calculated from the following:    Height as of this encounter: 1.727 m (5' 8\").    Weight as of this encounter: 108.9 kg (240 lb).       Follow-ups Needed After Discharge   Follow-up Appointments     Follow-up and recommended labs and tests       Follow-up with PCP in 1 week.  Recheck CBC BMP in 1 week  Follow-up with colorectal surgery in 4 to 6 weeks           Unresulted Labs Ordered in the Past 30 Days of this Admission       No orders found from 5/8/2024 to 6/8/2024.            Discharge Disposition   Discharged to home  Condition at discharge: Stable    Hospital Course   Eli Burton is a 59 year old female with no pertinent past medical history who presents with abdominal pain. Admitted on 6/7/2024.      Acute sigmoid diverticulitis with microperforation   *Presents with left-lower quadrant pain   *WBC 16.4. Afebrile. Exam non-surgical at this time.   *CT abd/pelvis with acute sigmoid diverticulitis with microperforation, no abscess; secondary inflammation of the urinary bladder and left adnexa  *Denies prior colonoscopy  Tolerating low fiber diet  - Piperacillin-tazobactam IV   -With IV antibiotics patient's symptoms much improved abdominal pain improved.  WBC count normalized now at 8.7K from 16.4 K on admission  Okay to discharge with oral Augmentin for 10 more days as per colorectal surgery    Patient needs to follow-up with the GI team all colorectal surgery in 6 to 8 weeks for colonoscopy outpatient     Mild inflammatory infiltrate at lung bases  Noted incidentally on CT. Likely " "secondary to intra-abdominal inflammation. On room air, denies respiratory symptoms.   No respiratory symptoms noted     Depression  Anxiety  - Resume prior to admission sertraline when dose confirmed by pharmacy  - Lorazepam PO PRN for increased anxiety over illness/hospitalization        Tobacco use disorder  - Encouraged cessation  - Nicotine replacement ordered                    Diet: Low fiber diet for 1 week and then regular diet  DVT Prophylaxis: Ambulate every shift  Medel Catheter: Not present  Lines: None     Cardiac Monitoring: None  Code Status: Full Code          Clinically Significant Risk Factors                                 # Obesity: Estimated body mass index is 36.49 kg/m  as calculated from the following:    Height as of this encounter: 1.727 m (5' 8\").    Weight as of this encounter: 108.9 kg (240 lb)., PRESENT ON ADMISSION                  Consultations This Hospital Stay   COLORECTAL SURGERY IP CONSULT  VASCULAR ACCESS ADULT IP CONSULT    Code Status   Full Code    Time Spent on this Encounter   I, Shana Barlow MD, personally saw the patient today and spent greater than 30 minutes discharging this patient.       Shana Barlow MD  [address] [address]  Phone: [phone]  Fax: [phone]  ______________________________________________________________________    Physical Exam   Vital Signs: Temp: 98.8  F (37.1  C) Temp src: Oral BP: 132/80 Pulse: 67   Resp: 18 SpO2: 94 % O2 Device: None (Room air)    Weight: 240 lbs 0 oz  General Appearance: Alert awake, not in acute distress  Respiratory: Clear to auscultation bilaterally  Cardiovascular: Normal rate rhythm regular  GI: Soft, nontender nondistended bowel sounds positive no guarding rigidity or rebound noted  Skin: Warm and dry  Other: No lower extremity edema       Primary Care Physician   Wei Ybarra    Discharge Orders      Reason for your hospital stay    " Acute diverticulitis with microperforation with no abscess seen     Follow-up and recommended labs and tests     Follow-up with PCP in 1 week.  Recheck CBC BMP in 1 week  Follow-up with colorectal surgery in 4 to 6 weeks     Activity    Your activity upon discharge: activity as tolerated     Diet    Follow this diet upon discharge: Low fiber diet for 1 week and then regular diet       Significant Results and Procedures   Most Recent 3 CBC's:  Recent Labs   Lab Test 06/09/24 0738 06/08/24 1050 06/07/24 2013   WBC 8.7 14.4* 16.4*   HGB 14.6 14.8 15.4   MCV 95 94 92    263 259     Most Recent 3 BMP's:  Recent Labs   Lab Test 06/09/24 0738 06/08/24  1050 06/07/24 2013 05/23/22  1108   NA  --  139 136 140   POTASSIUM 4.2 4.2 4.0 3.9   CHLORIDE  --  103 100 108   CO2  --  26 24 26   BUN  --  7.3* 9.3 14   CR  --  0.69 0.71 0.65   ANIONGAP  --  10 12 6   NILO  --  8.5* 8.8 8.5   GLC  --  89 103* 95     Most Recent 2 LFT's:  Recent Labs   Lab Test 06/07/24 2013 05/23/22  1108   AST 18 8   ALT 7 13   ALKPHOS 70 74   BILITOTAL 0.7 0.4     Most Recent 3 INR's:  Recent Labs   Lab Test 09/18/17  1549 03/27/17  1459   INR 1.03 1.00     Most Recent INR's and Anticoagulation Dosing History:  Anticoagulation Dose History          Latest Ref Rng & Units 3/27/2017 9/18/2017   Recent Dosing and Labs   INR 0.86 - 1.14 1.00        This test is intended for monitoring Coumadin therapy.  Results are not   accurate   in patients with prolonged INR due to factor deficiency.   1.03      Most Recent 3 Creatinines:  Recent Labs   Lab Test 06/08/24  1050 06/07/24 2013 05/23/22  1108   CR 0.69 0.71 0.65     Most Recent 3 Hemoglobins:  Recent Labs   Lab Test 06/09/24 0738 06/08/24 1050 06/07/24 2013   HGB 14.6 14.8 15.4     Most Recent 3 Troponin's:No lab results found.  Most Recent 3 BNP's:No lab results found.  Most Recent D-dimer:No lab results found.  Most Recent Cholesterol Panel:  Recent Labs   Lab Test 05/23/22  1108   CHOL  217*   *   HDL 44*   TRIG 120     7-Day Micro Results       Collected Updated Procedure Result Status      06/07/2024 1827 06/07/2024 1842 Wet preparation [[id number]]   (Abnormal)   Swab from Vagina    Final result Component Value   Trichomonas Absent   Yeast Absent   Clue Cells Present   WBCs/high power field 2+                  Most Recent TSH and T4:  Recent Labs   Lab Test 05/23/22  1108   TSH 1.66     Most Recent Hemoglobin A1c:No lab results found.  Most Recent 6 glucoses:  Recent Labs   Lab Test 06/08/24  1050 06/07/24 2013 05/23/22  1108 02/08/19  0757 11/30/17  1547 09/18/17  1549   GLC 89 103* 95 93 76 84     Most Recent Urinalysis:  Recent Labs   Lab Test 06/08/24  0017 06/07/24  1827   COLOR Yellow Yellow   APPEARANCE Clear Clear   URINEGLC Negative Negative   URINEBILI Negative Moderate*   URINEKETONE 20* 40*   SG 1.010 >=1.030   UBLD Negative Negative   URINEPH 6.5 6.5   PROTEIN 10* 100*   UROBILINOGEN  --  2.0*   NITRITE Negative Negative   LEUKEST Negative Negative   RBCU 2 0-2   WBCU 1 0-5     Most Recent ABG:No lab results found.  Most Recent ESR & CRP:No lab results found.  Most Recent Anemia Panel:  Recent Labs   Lab Test 06/09/24  0738   WBC 8.7   HGB 14.6   HCT 44.9   MCV 95        Most Recent CPK:No lab results found.,   Results for orders placed or performed during the hospital encounter of 06/07/24   CT Abdomen Pelvis w Contrast    Narrative    EXAM: CT ABDOMEN PELVIS W CONTRAST  LOCATION: [facility]  DATE: 6/8/2024    INDICATION: LLQ pain  COMPARISON: Abdominal ultrasound 4/6/2017.  TECHNIQUE: CT scan of the abdomen and pelvis was performed following injection of IV contrast. Multiplanar reformats were obtained. Dose reduction techniques were used.  CONTRAST: 121 mL Isovue 370    FINDINGS:   LOWER CHEST: Mild nodular infiltrate at the bases of the included lower lobes has an inflammatory appearance.    HEPATOBILIARY: Normal.    PANCREAS:  Normal.    SPLEEN: Normal.    ADRENAL GLANDS: Normal.    KIDNEYS/BLADDER: Secondary wall thickening and inflammation of the urinary bladder due to sigmoid diverticulitis discussed below. No gas in the bladder. Four nonobstructing left intrarenal stones measuring 3-4 mm in size. Tiny benign cysts of the kidneys   not warranting follow-up.    BOWEL: Colonic diverticulosis. Focal wall thickening and marked inflammatory stranding of the mid sigmoid colon centered on a diverticulum. A few tiny locules of extraluminal gas in the nearby fat. Trace layering fluid in the pelvis. No abscess.   Secondary inflammation of the nearby urinary bladder and left adnexa. No bowel obstruction. Normal appendix.    LYMPH NODES: Normal.    VASCULATURE: Mild to moderate aortoiliac atherosclerosis. No aneurysm.    PELVIC ORGANS: Normal.    MUSCULOSKELETAL: Degenerative changes of the spine.      Impression    IMPRESSION:   1.  Acute sigmoid diverticulitis complicated by microperforation. No abscess.  2.  Secondary inflammation of the urinary bladder and left adnexa.  3.  Nonobstructive nephrolithiasis on the left.  4.  Mild inflammatory infiltrate at the bases of the lower lobes.       Discharge Medications   Current Discharge Medication List        START taking these medications    Details   acetaminophen (TYLENOL) 325 MG tablet Take 2 tablets (650 mg) by mouth every 6 hours as needed for mild pain or other (and adjunct with moderate or severe pain or per patient request)    Associated Diagnoses: Diverticulitis of large intestine with perforation without bleeding      amoxicillin-clavulanate (AUGMENTIN) 875-125 MG tablet Take 1 tablet by mouth every 12 hours for 10 days  Qty: 20 tablet, Refills: 0    Associated Diagnoses: Diverticulitis of large intestine with perforation without bleeding           CONTINUE these medications which have NOT CHANGED    Details   sertraline (ZOLOFT) 50 MG tablet Take 50 mg by mouth daily           Allergies    No Known Allergies

## 2024-06-10 NOTE — PLAN OF CARE
Date/Time: 6/9/24 1900-2300    Summary: 58 y/o F who presented to ED for increased abdominal pain and diarrhea.  Diagnosis: diverticulitis.  Orientation: A&Ox4  Behavior & Aggression: green  Activity: ind  Diet: full liquid  Fall risk: no  Isolation: N/A  Pain: denies  B&B: continent, no BM this shift; had loose/water stools earlier today.  VS/O2: VSS, RA; hypertensive at times.  IV: R PIV, SL.  Drains/Devices: N/A  Tele: N/A  Abnormal Labs: none this shift.  Skin: intact  Consults/Procedures: N/A  D/C Date: poss susy, 6/10  Other: N/A

## 2024-06-10 NOTE — PROGRESS NOTES
VSS. A/O. Ind. Denies pain. Tolerating diet. +bm. Voiding. D''c home with antibiotic & follow up.

## 2024-06-10 NOTE — PLAN OF CARE
Goal Outcome Evaluation:      Plan of Care Reviewed With: patient    Overall Patient Progress: improvingOverall Patient Progress: improving         Date/Time: 6/10/24 9070-5834      Surgery/POD#: acute diverticulitis with a microperforation with no abscess.  Behavior & Aggression: Green  Fall Risk: No  Orientation:AOx4  ABNL VS/O2:VSS on RA  ABNL Labs: N/a  Pain Management: Denies  Bowel/Bladder: No BM this shift. Voiding in BR  Drains: N/A  Wounds/incisions: N/A  Diet: full liquid   Activity Level: ind   Tests/Procedures: N/A  Anticipated  DC Date: ? 6/10  Significant Information: Int Abx. PIV LR 125mL/hr. K protocol.

## 2024-06-10 NOTE — PROGRESS NOTES
COLON & RECTAL SURGERY PROGRESS NOTE    S:  feels well. Tolerating a liquid diet. Pain resolved. Having diarrhea with PO intake.     Vitals:  Vitals:    06/09/24 1554 06/09/24 2034 06/09/24 2300 06/10/24 0720   BP: (!) 145/85 (!) 153/101 138/83 132/80   BP Location: Right arm Right arm Right arm Right arm   Patient Position:  Sitting Prone    Cuff Size:  Adult Regular Adult Regular    Pulse: 68 72 78 67   Resp: 18 16 18 18   Temp: 98.6  F (37  C) 99.3  F (37.4  C) 98.6  F (37  C) 98.8  F (37.1  C)   TempSrc: Oral Oral Oral Oral   SpO2: 94% 96% 95% 94%   Weight:       Height:         I/O:  I/O last 3 completed shifts:  In: 240 [P.O.:240]  Out: -     PE:  General Appearance: no acute distress,  alert and oriented   Abdomen: soft, not tender, not distended  Extremities: warm and well-perfused     Labs:  Recent Labs   Lab 06/09/24  0738 06/08/24  1050 06/07/24 2013   WBC 8.7 14.4* 16.4*   HGB 14.6 14.8 15.4    263 259   NA  --  139 136   POTASSIUM 4.2 4.2 4.0   CHLORIDE  --  103 100   CO2  --  26 24   BUN  --  7.3* 9.3   CR  --  0.69 0.71   GLC  --  89 103*   AST  --   --  18   ALT  --   --  7     A/P: 59YF with diverticulitis who is clinically improving. I switched her to a low fiber diet this AM and can transition to oral antibiotics today for a total of 10 days.     OK for discharge from a surgery perspective.    Our office will reach out to arrange colonoscopy in 6-8 weeks.     For questions/paging, please contact the CRS office at [phone].     Kaylee Walton MD Colorectal Surgery Fellow  [facility]  [address]  [place] [address]  T: [phone]  F: [phone]   [email or web address]

## 2024-06-11 ENCOUNTER — PATIENT OUTREACH (OUTPATIENT)
Dept: FAMILY MEDICINE | Facility: CLINIC | Age: 59
End: 2024-06-11
Payer: COMMERCIAL

## 2024-06-11 NOTE — TELEPHONE ENCOUNTER
ED / Discharge Outreach Protocol    Patient Contact    Attempt # 1    Was call answered?  No.  Left message on voicemail with information to call me back.    Dana BALLESTEROS RN

## 2024-06-12 NOTE — TELEPHONE ENCOUNTER
Transitions of Care Outreach  Chief Complaint   Patient presents with    Hospital F/U       Most Recent Admission Date: 6/7/2024   Most Recent Admission Diagnosis: Diverticulitis of large intestine with perforation without bleeding - K57.20     Most Recent Discharge Date: 6/10/2024   Most Recent Discharge Diagnosis: Diverticulitis of large intestine with perforation without bleeding - K57.20       Transitions of Care Assessment    Discharge Assessment  How are you doing now that you are home?: feeling better, no symptoms now, back to work  How are your symptoms? (Red Flag symptoms escalate to triage hotline per guidelines): Improved  Do you know how to contact your clinic care team if you have future questions or changes to your health status? : Yes  Does the patient have their discharge instructions? : Yes  Does the patient have questions regarding their discharge instructions? : No    Follow up Plan          Future Appointments   Date Time Provider Department Center   6/18/2024 11:30 AM Wegener, Joel Daniel Irwin, MD [facility]       Outpatient Plan as outlined on AVS reviewed with patient.    For any urgent concerns, please contact our 24 hour nurse triage line: [phone] ([phone])       Olga BRUCE RN

## 2024-06-18 ENCOUNTER — OFFICE VISIT (OUTPATIENT)
Dept: FAMILY MEDICINE | Facility: CLINIC | Age: 59
End: 2024-06-18
Payer: COMMERCIAL

## 2024-06-18 VITALS
WEIGHT: 234 LBS | TEMPERATURE: 97.8 F | DIASTOLIC BLOOD PRESSURE: 76 MMHG | OXYGEN SATURATION: 96 % | HEIGHT: 68 IN | SYSTOLIC BLOOD PRESSURE: 142 MMHG | RESPIRATION RATE: 16 BRPM | HEART RATE: 82 BPM | BODY MASS INDEX: 35.46 KG/M2

## 2024-06-18 DIAGNOSIS — E66.09 CLASS 2 OBESITY DUE TO EXCESS CALORIES WITHOUT SERIOUS COMORBIDITY WITH BODY MASS INDEX (BMI) OF 35.0 TO 35.9 IN ADULT: ICD-10-CM

## 2024-06-18 DIAGNOSIS — E66.812 CLASS 2 OBESITY DUE TO EXCESS CALORIES WITHOUT SERIOUS COMORBIDITY WITH BODY MASS INDEX (BMI) OF 35.0 TO 35.9 IN ADULT: ICD-10-CM

## 2024-06-18 DIAGNOSIS — K57.32 DIVERTICULITIS OF COLON: Primary | ICD-10-CM

## 2024-06-18 DIAGNOSIS — R73.9 ELEVATED BLOOD SUGAR: ICD-10-CM

## 2024-06-18 DIAGNOSIS — F41.9 ANXIETY: ICD-10-CM

## 2024-06-18 DIAGNOSIS — Z71.6 ENCOUNTER FOR SMOKING CESSATION COUNSELING: ICD-10-CM

## 2024-06-18 DIAGNOSIS — Z13.1 SCREENING FOR DIABETES MELLITUS: ICD-10-CM

## 2024-06-18 LAB
ALBUMIN SERPL BCG-MCNC: 4.2 G/DL (ref 3.5–5.2)
ALP SERPL-CCNC: 69 U/L (ref 40–150)
ALT SERPL W P-5'-P-CCNC: 9 U/L (ref 0–50)
ANION GAP SERPL CALCULATED.3IONS-SCNC: 11 MMOL/L (ref 7–15)
AST SERPL W P-5'-P-CCNC: 18 U/L (ref 0–45)
BILIRUB SERPL-MCNC: 0.2 MG/DL
BUN SERPL-MCNC: 9.3 MG/DL (ref 8–23)
CALCIUM SERPL-MCNC: 9.1 MG/DL (ref 8.8–10.2)
CHLORIDE SERPL-SCNC: 105 MMOL/L (ref 98–107)
CREAT SERPL-MCNC: 0.69 MG/DL (ref 0.51–0.95)
DEPRECATED HCO3 PLAS-SCNC: 27 MMOL/L (ref 22–29)
EGFRCR SERPLBLD CKD-EPI 2021: >90 ML/MIN/1.73M2
ERYTHROCYTE [DISTWIDTH] IN BLOOD BY AUTOMATED COUNT: 12 % (ref 10–15)
GLUCOSE SERPL-MCNC: 78 MG/DL (ref 70–99)
HBA1C MFR BLD: 5.5 % (ref 0–5.6)
HCT VFR BLD AUTO: 48.3 % (ref 35–47)
HGB BLD-MCNC: 15.8 G/DL (ref 11.7–15.7)
MCH RBC QN AUTO: 30.7 PG (ref 26.5–33)
MCHC RBC AUTO-ENTMCNC: 32.7 G/DL (ref 31.5–36.5)
MCV RBC AUTO: 94 FL (ref 78–100)
PLATELET # BLD AUTO: 379 10E3/UL (ref 150–450)
POTASSIUM SERPL-SCNC: 4.2 MMOL/L (ref 3.4–5.3)
PROT SERPL-MCNC: 7.5 G/DL (ref 6.4–8.3)
RBC # BLD AUTO: 5.15 10E6/UL (ref 3.8–5.2)
SODIUM SERPL-SCNC: 143 MMOL/L (ref 135–145)
WBC # BLD AUTO: 7.5 10E3/UL (ref 4–11)

## 2024-06-18 PROCEDURE — 80053 COMPREHEN METABOLIC PANEL: CPT | Performed by: FAMILY MEDICINE

## 2024-06-18 PROCEDURE — 83036 HEMOGLOBIN GLYCOSYLATED A1C: CPT | Performed by: FAMILY MEDICINE

## 2024-06-18 PROCEDURE — 85027 COMPLETE CBC AUTOMATED: CPT | Performed by: FAMILY MEDICINE

## 2024-06-18 PROCEDURE — 90471 IMMUNIZATION ADMIN: CPT | Performed by: FAMILY MEDICINE

## 2024-06-18 PROCEDURE — 99495 TRANSJ CARE MGMT MOD F2F 14D: CPT | Performed by: FAMILY MEDICINE

## 2024-06-18 PROCEDURE — 90715 TDAP VACCINE 7 YRS/> IM: CPT | Performed by: FAMILY MEDICINE

## 2024-06-18 PROCEDURE — 36415 COLL VENOUS BLD VENIPUNCTURE: CPT | Performed by: FAMILY MEDICINE

## 2024-06-18 RX ORDER — SERTRALINE HYDROCHLORIDE 100 MG/1
100 TABLET, FILM COATED ORAL DAILY
Qty: 90 TABLET | Refills: 0 | Status: SHIPPED | OUTPATIENT
Start: 2024-06-18 | End: 2024-07-29

## 2024-06-18 RX ORDER — METFORMIN HCL 500 MG
TABLET, EXTENDED RELEASE 24 HR ORAL
Qty: 60 TABLET | Refills: 1 | Status: SHIPPED | OUTPATIENT
Start: 2024-06-18 | End: 2024-07-29

## 2024-06-18 ASSESSMENT — ANXIETY QUESTIONNAIRES
3. WORRYING TOO MUCH ABOUT DIFFERENT THINGS: MORE THAN HALF THE DAYS
7. FEELING AFRAID AS IF SOMETHING AWFUL MIGHT HAPPEN: SEVERAL DAYS
8. IF YOU CHECKED OFF ANY PROBLEMS, HOW DIFFICULT HAVE THESE MADE IT FOR YOU TO DO YOUR WORK, TAKE CARE OF THINGS AT HOME, OR GET ALONG WITH OTHER PEOPLE?: SOMEWHAT DIFFICULT
7. FEELING AFRAID AS IF SOMETHING AWFUL MIGHT HAPPEN: SEVERAL DAYS
GAD7 TOTAL SCORE: 9
IF YOU CHECKED OFF ANY PROBLEMS ON THIS QUESTIONNAIRE, HOW DIFFICULT HAVE THESE PROBLEMS MADE IT FOR YOU TO DO YOUR WORK, TAKE CARE OF THINGS AT HOME, OR GET ALONG WITH OTHER PEOPLE: SOMEWHAT DIFFICULT
5. BEING SO RESTLESS THAT IT IS HARD TO SIT STILL: NOT AT ALL
4. TROUBLE RELAXING: SEVERAL DAYS
GAD7 TOTAL SCORE: 9
6. BECOMING EASILY ANNOYED OR IRRITABLE: SEVERAL DAYS
2. NOT BEING ABLE TO STOP OR CONTROL WORRYING: MORE THAN HALF THE DAYS
1. FEELING NERVOUS, ANXIOUS, OR ON EDGE: MORE THAN HALF THE DAYS
GAD7 TOTAL SCORE: 9

## 2024-06-18 ASSESSMENT — PATIENT HEALTH QUESTIONNAIRE - PHQ9
10. IF YOU CHECKED OFF ANY PROBLEMS, HOW DIFFICULT HAVE THESE PROBLEMS MADE IT FOR YOU TO DO YOUR WORK, TAKE CARE OF THINGS AT HOME, OR GET ALONG WITH OTHER PEOPLE: SOMEWHAT DIFFICULT
SUM OF ALL RESPONSES TO PHQ QUESTIONS 1-9: 10
SUM OF ALL RESPONSES TO PHQ QUESTIONS 1-9: 10

## 2024-06-18 ASSESSMENT — PAIN SCALES - GENERAL: PAINLEVEL: NO PAIN (0)

## 2024-06-18 NOTE — PROGRESS NOTES
ASSESSMENT AND PLAN  1. Diverticulitis of colon  Discussed slowly advancing diet now that antibiotics almost complete. Assuming stomach pain does not return goal normal high fiber diet in 1-2 weeks.  Colorectal surgery follow up referral given as previously requested.  Aware at some point will need colonoscopy follow up.   - Adult Colorectal Surgery  Referral; Future  - Comprehensive metabolic panel (BMP + Alb, Alk Phos, ALT, AST, Total. Bili, TP); Future  - CBC with platelets; Future    2. Anxiety  Increase back to 100mg/day. Discussed current stressors of father in hospital with open heart surgery today and her recent illness.  No suicidal ideation.   - sertraline (ZOLOFT) 100 MG tablet; Take 1 tablet (100 mg) by mouth daily for 90 days  Dispense: 90 tablet; Refill: 0    3. Elevated blood sugar  Screen today.   - Hemoglobin A1c; Future    4. Screening for diabetes mellitus    - Hemoglobin A1c; Future    5. Encounter for smoking cessation counseling  Plans to buy over the counter patches.  Smoking 10cigs= 1/2 ppd.  Educated 14mcg patch = 1/2 pack/day and 7mcg patch = 1/3 pack/day in order to dose correctly.     6. Class 2 obesity due to excess calories without serious comorbidity with body mass index (BMI) of 35.0 to 35.9 in adult  Trial off-label metformin start 500mg/day start in 1-2 weeks after over diverticulitis and increase to one pill twice daily 2-3 weeks after that since can sometimes cause stomach upset.     Tdap today.     Schedule annual physical.       35 minutes spent on the date of the encounter doing chart review, history and exam, negotiating and explaining the plan with the patient, documentation and further activities as noted above.                Subjective   Manny is a 59 year old, presenting for the following health issues:  Hospital F/U        6/18/2024    11:26 AM   Additional Questions   Roomed by aida sawant   Accompanied by self     HPI     Patient would like to discuss smoking  "cessation. Had patch at hospital and helped. Currently smoking 10 cigarettes per day .   Would also like to discuss weight loss medication.              Hospital Follow-up Visit:    Hospital/Nursing Home/IP Rehab Facility: [facility]  Date of Admission: 06/07/2024  Date of Discharge: 06/10/2024  Reason(s) for Admission: Acute diverticulitis with microperforation no abscess seen   Was the patient in the ICU or did the patient experience delirium during hospitalization?  No  Do you have any other stressors you would like to discuss with your provider? OTHER: father is currently having heart surgery and sister's cat is seeing vet for issue.     Problems taking medications regularly:  None  Medication changes since discharge: None  Problems adhering to non-medication therapy:  None    Summary of hospitalization:  [facility] discharge summary reviewed  Diagnostic Tests/Treatments reviewed.  Follow up needed: with primary care and colorectal.   Other Healthcare Providers Involved in Patient s Care:          colorectal surgery  Update since discharge: improved.         Plan of care communicated with patient                       Objective    BP (!) 142/76 (BP Location: Right arm, Patient Position: Sitting, Cuff Size: Adult Large)   Pulse 82   Temp 97.8  F (36.6  C) (Temporal)   Resp 16   Ht 1.727 m (5' 8\")   Wt 106.1 kg (234 lb)   SpO2 96%   BMI 35.58 kg/m    Body mass index is 35.58 kg/m .  Physical Exam   Tearful today.   Abdomen benign/non-tender.             Signed Electronically by: Joel Daniel Wegener, MD    Answers submitted by the patient for this visit:  Patient Health Questionnaire (Submitted on 6/18/2024)  If you checked off any problems, how difficult have these problems made it for you to do your work, take care of things at home, or get along with other people?: Somewhat difficult  PHQ9 TOTAL SCORE: 10  DAVID-7 (Submitted on 6/18/2024)  DAVID 7 TOTAL SCORE: 9    "

## 2024-06-18 NOTE — PATIENT INSTRUCTIONS
ASSESSMENT AND PLAN  1. Diverticulitis of colon  Discussed slowly advancing diet now that antibiotics almost complete. Assuming stomach pain does not return goal normal high fiber diet in 1-2 weeks.  Colorectal surgery follow up referral given as previously requested.  Aware at some point will need colonoscopy follow up.   - Adult Colorectal Surgery  Referral; Future  - Comprehensive metabolic panel (BMP + Alb, Alk Phos, ALT, AST, Total. Bili, TP); Future  - CBC with platelets; Future    2. Anxiety  Increase back to 100mg/day. Discussed current stressors.   - sertraline (ZOLOFT) 100 MG tablet; Take 1 tablet (100 mg) by mouth daily for 90 days  Dispense: 90 tablet; Refill: 0    3. Elevated blood sugar  Screen today.   - Hemoglobin A1c; Future    4. Screening for diabetes mellitus    - Hemoglobin A1c; Future    5. Encounter for smoking cessation counseling  Plans to buy over the counter patches.  Smoking 10cigs= 1/2 ppd.  Educated 14mcg patch = 1/2 pack/day and 7mcg patch = 1/3 pack/day in order to dose correctly.     6. Class 2 obesity due to excess calories without serious comorbidity with body mass index (BMI) of 35.0 to 35.9 in adult  Trial off-label metformin start 500mg/day start in 1-2 weeks after over diverticulitis and increase to one pill twice daily 2-3 weeks after that since can sometimes cause stomach upset.     Tdap today.     Schedule annual physical.

## 2024-06-18 NOTE — LETTER
July 15, 2024      Manny Burton  [address]  [facility] [address]        Dear ,    We are writing to inform you of your test results.    Your hemoglobin was slightly high which can be from smoking although at this pont not dangerous. The white blood cell count was normal however indicating the diverticulitis was well treated.  The diabetes testing was normal. The liver/kidney panel was normal.     Resulted Orders   Hemoglobin A1c   Result Value Ref Range    Hemoglobin A1C 5.5 0.0 - 5.6 %      Comment:      Normal <5.7%   Prediabetes 5.7-6.4%    Diabetes 6.5% or higher     Note: Adopted from ADA consensus guidelines.   Comprehensive metabolic panel (BMP + Alb, Alk Phos, ALT, AST, Total. Bili, TP)   Result Value Ref Range    Sodium 143 135 - 145 mmol/L      Comment:      Reference intervals for this test were updated on 09/26/2023 to more accurately reflect our healthy population. There may be differences in the flagging of prior results with similar values performed with this method. Interpretation of those prior results can be made in the context of the updated reference intervals.     Potassium 4.2 3.4 - 5.3 mmol/L    Carbon Dioxide (CO2) 27 22 - 29 mmol/L    Anion Gap 11 7 - 15 mmol/L    Urea Nitrogen 9.3 8.0 - 23.0 mg/dL    Creatinine 0.69 0.51 - 0.95 mg/dL    GFR Estimate >90 >60 mL/min/1.73m2      Comment:      eGFR calculated using 2021 CKD-EPI equation.    Calcium 9.1 8.8 - 10.2 mg/dL    Chloride 105 98 - 107 mmol/L    Glucose 78 70 - 99 mg/dL    Alkaline Phosphatase 69 40 - 150 U/L    AST 18 0 - 45 U/L      Comment:      Reference intervals for this test were updated on 6/12/2023 to more accurately reflect our healthy population. There may be differences in the flagging of prior results with similar values performed with this method. Interpretation of those prior results can be made in the context of the updated reference intervals.    ALT 9 0 - 50 U/L      Comment:      Reference  intervals for this test were updated on 6/12/2023 to more accurately reflect our healthy population. There may be differences in the flagging of prior results with similar values performed with this method. Interpretation of those prior results can be made in the context of the updated reference intervals.      Protein Total 7.5 6.4 - 8.3 g/dL    Albumin 4.2 3.5 - 5.2 g/dL    Bilirubin Total 0.2 <=1.2 mg/dL   CBC with platelets   Result Value Ref Range    WBC Count 7.5 4.0 - 11.0 10e3/uL    RBC Count 5.15 3.80 - 5.20 10e6/uL    Hemoglobin 15.8 (H) 11.7 - 15.7 g/dL    Hematocrit 48.3 (H) 35.0 - 47.0 %    MCV 94 78 - 100 fL    MCH 30.7 26.5 - 33.0 pg    MCHC 32.7 31.5 - 36.5 g/dL    RDW 12.0 10.0 - 15.0 %    Platelet Count 379 150 - 450 10e3/uL       If you have any questions or concerns, please call the clinic at the number listed above.       Sincerely,      Joel Daniel Irwin Wegener, MD

## 2024-07-29 ENCOUNTER — OFFICE VISIT (OUTPATIENT)
Dept: FAMILY MEDICINE | Facility: CLINIC | Age: 59
End: 2024-07-29
Payer: COMMERCIAL

## 2024-07-29 VITALS
TEMPERATURE: 97.5 F | BODY MASS INDEX: 34.33 KG/M2 | OXYGEN SATURATION: 94 % | DIASTOLIC BLOOD PRESSURE: 84 MMHG | RESPIRATION RATE: 18 BRPM | SYSTOLIC BLOOD PRESSURE: 132 MMHG | HEART RATE: 70 BPM | HEIGHT: 69 IN | WEIGHT: 231.8 LBS

## 2024-07-29 DIAGNOSIS — Z12.4 CERVICAL CANCER SCREENING: ICD-10-CM

## 2024-07-29 DIAGNOSIS — Z00.00 ROUTINE GENERAL MEDICAL EXAMINATION AT A HEALTH CARE FACILITY: Primary | ICD-10-CM

## 2024-07-29 DIAGNOSIS — F41.9 ANXIETY: ICD-10-CM

## 2024-07-29 DIAGNOSIS — Z12.31 VISIT FOR SCREENING MAMMOGRAM: ICD-10-CM

## 2024-07-29 DIAGNOSIS — E78.5 HYPERLIPIDEMIA LDL GOAL <130: ICD-10-CM

## 2024-07-29 DIAGNOSIS — L98.9 SKIN LESION: ICD-10-CM

## 2024-07-29 DIAGNOSIS — H61.21 IMPACTED CERUMEN OF RIGHT EAR: ICD-10-CM

## 2024-07-29 DIAGNOSIS — Z82.49 FAMILY HISTORY OF HEART VALVE ABNORMALITY: ICD-10-CM

## 2024-07-29 DIAGNOSIS — E66.09 CLASS 2 OBESITY DUE TO EXCESS CALORIES WITHOUT SERIOUS COMORBIDITY WITH BODY MASS INDEX (BMI) OF 35.0 TO 35.9 IN ADULT: ICD-10-CM

## 2024-07-29 DIAGNOSIS — E66.812 CLASS 2 OBESITY DUE TO EXCESS CALORIES WITHOUT SERIOUS COMORBIDITY WITH BODY MASS INDEX (BMI) OF 35.0 TO 35.9 IN ADULT: ICD-10-CM

## 2024-07-29 PROCEDURE — 99396 PREV VISIT EST AGE 40-64: CPT | Performed by: PHYSICIAN ASSISTANT

## 2024-07-29 RX ORDER — SERTRALINE HYDROCHLORIDE 100 MG/1
100 TABLET, FILM COATED ORAL DAILY
Qty: 90 TABLET | Refills: 3 | Status: SHIPPED | OUTPATIENT
Start: 2024-07-29

## 2024-07-29 RX ORDER — METFORMIN HCL 500 MG
500 TABLET, EXTENDED RELEASE 24 HR ORAL 2 TIMES DAILY WITH MEALS
Qty: 180 TABLET | Refills: 1 | Status: SHIPPED | OUTPATIENT
Start: 2024-07-29

## 2024-07-29 SDOH — HEALTH STABILITY: PHYSICAL HEALTH: ON AVERAGE, HOW MANY DAYS PER WEEK DO YOU ENGAGE IN MODERATE TO STRENUOUS EXERCISE (LIKE A BRISK WALK)?: 3 DAYS

## 2024-07-29 ASSESSMENT — SOCIAL DETERMINANTS OF HEALTH (SDOH): HOW OFTEN DO YOU GET TOGETHER WITH FRIENDS OR RELATIVES?: ONCE A WEEK

## 2024-07-29 ASSESSMENT — PAIN SCALES - GENERAL: PAINLEVEL: NO PAIN (0)

## 2024-07-29 NOTE — PROGRESS NOTES
"Preventive Care Visit  [facility]  Wei Ybarra PA-C, Family Medicine  Jul 29, 2024      Assessment & Plan     Routine general medical examination at a health care facility  Declines fasting labs    Hyperlipidemia LDL goal <130  As above    Visit for screening mammogram    - MA Screening Bilateral w/ Giovanny; Future    Anxiety    - sertraline (ZOLOFT) 100 MG tablet; Take 1 tablet (100 mg) by mouth daily    Class 2 obesity due to excess calories without serious comorbidity with body mass index (BMI) of 35.0 to 35.9 in adult  Feels like this has  been helpful  - metFORMIN (GLUCOPHAGE XR) 500 MG 24 hr tablet; Take 1 tablet (500 mg) by mouth 2 times daily (with meals) Start one pill daily for two weeks then increase to two pills daily    Skin lesion    - Adult Dermatology  Referral; Future    Family history of heart valve abnormality    - Echocardiogram Complete; Future    Cervical cancer screening  May look to follow up with GYN    Impacted cerumen of right ear  MA able to irrigate out successfully.  Patient tolerated well and canal clear upon recheck.  - NV REMOVAL IMPACTED CERUMEN IRRIGATION/LVG UNILAT            BMI  Estimated body mass index is 34.37 kg/m  as calculated from the following:    Height as of this encounter: 1.749 m (5' 8.86\").    Weight as of this encounter: 105.1 kg (231 lb 12.8 oz).   Weight management plan: Discussed healthy diet and exercise guidelines    Counseling  Appropriate preventive services were addressed with this patient via screening, questionnaire, or discussion as appropriate for fall prevention, nutrition, physical activity, Tobacco-use cessation, weight loss and cognition.  Checklist reviewing preventive services available has been given to the patient.  Reviewed patient's diet, addressing concerns and/or questions.   She is at risk for lack of exercise and has been provided with information to increase physical activity for the benefit of her " well-being.   The patient was instructed to see the dentist every 6 months.   The patient reports drinking more than 3 alcoholic drinks per day and/or more than 7 drhnks per week. The patient was counseled and given information about possible harmful effects of excessive alcohol intake.        Subjective   Manny is a 59 year old, presenting for the following:  Physical        7/29/2024    11:28 AM   Additional Questions   Roomed by [facility] Care Directive  Patient does not have a Health Care Directive or Living Will:     HPI              7/29/2024   General Health   How would you rate your overall physical health? Good   Feel stress (tense, anxious, or unable to sleep) To some extent      (!) STRESS CONCERN      7/29/2024   Nutrition   Three or more servings of calcium each day? Yes   Diet: Regular (no restrictions)   How many servings of fruit and vegetables per day? (!) 2-3   How many sweetened beverages each day? 0-1            7/29/2024   Exercise   Days per week of moderate/strenous exercise 3 days            7/29/2024   Social Factors   Frequency of gathering with friends or relatives Once a week   Worry food won't last until get money to buy more No   Food not last or not have enough money for food? No   Do you have housing? (Housing is defined as stable permanent housing and does not include staying ouside in a car, in a tent, in an abandoned building, in an overnight shelter, or couch-surfing.) Yes   Are you worried about losing your housing? No   Lack of transportation? No   Unable to get utilities (heat,electricity)? No            7/29/2024   Fall Risk   Fallen 2 or more times in the past year? No   Trouble with walking or balance? No             7/29/2024   Dental   Dentist two times every year? (!) NO            7/29/2024   TB Screening   Were you born outside of the [place]? No            Today's PHQ-2 Score:       7/29/2024    11:16 AM   PHQ-2 ( 1999 [employer])   Q1: Little interest or pleasure  in doing things 1   Q2: Feeling down, depressed or hopeless 0   PHQ-2 Score 1   Q1: Little interest or pleasure in doing things Several days   Q2: Feeling down, depressed or hopeless Not at all   PHQ-2 Score 1           7/29/2024   Substance Use   Alcohol more than 3/day or more than 7/wk Yes   How often do you have a drink containing alcohol 2 to 3 times a week   How many alcohol drinks on typical day 5 or 6   How often do you have 5+ drinks at one occasion Monthly   Audit 2/3 Score 4   How often not able to stop drinking once started Never   How often failed to do what normally expected Never   How often needed first drink in am after a heavy drinking session Never   How often feeling of guilt or remorse after drinking Never   How often unable to remember what happened the night before Never   Have you or someone else been injured because of your drinking No   Has anyone been concerned or suggested you cut down on drinking No   TOTAL SCORE - AUDIT 7   Do you use any other substances recreationally? (!) CANNABIS PRODUCTS        Social History     Tobacco Use    Smoking status: Every Day     Current packs/day: 0.50     Types: Cigarettes    Smokeless tobacco: Never   Vaping Use    Vaping status: Never Used   Substance Use Topics    Alcohol use: Yes     Alcohol/week: 0.0 standard drinks of alcohol     Comment: 0-10 light beers weekly    Drug use: Yes     Types: Marijuana           6/30/2022   LAST FHS-7 RESULTS   1st degree relative breast or ovarian cancer No   Any relative bilateral breast cancer No   Any male have breast cancer No   Any ONE woman have BOTH breast AND ovarian cancer No   Any woman with breast cancer before 50yrs No   2 or more relatives with breast AND/OR ovarian cancer No   2 or more relatives with breast AND/OR bowel cancer No           Mammogram Screening - Annual screen due to greater than 20% lifetime risk as estimated by Breast Cancer Risk Calculator        7/29/2024   STI Screening   New  "sexual partner(s) since last STI/HIV test? No        History of abnormal Pap smear: No - age 30- 64 PAP with HPV every 5 years recommended        Latest Ref Rng & Units 3/21/2017     2:20 PM 3/21/2017     2:04 PM   PAP / HPV   PAP (Historical)   NIL    HPV 16 DNA NEG Negative     HPV 18 DNA NEG Negative     Other HR HPV NEG Negative       ASCVD Risk   The 10-year ASCVD risk score (Micheal ESTEVES, et al., 2019) is: 9%    Values used to calculate the score:      Age: 59 years      Sex: Female      Is Non- : No      Diabetic: No      Tobacco smoker: Yes      Systolic Blood Pressure: 132 mmHg      Is BP treated: No      HDL Cholesterol: 44 mg/dL      Total Cholesterol: 217 mg/dL           Reviewed and updated as needed this visit by Provider                    BP Readings from Last 3 Encounters:   07/29/24 132/84   06/18/24 (!) 142/76   06/10/24 132/80    Wt Readings from Last 3 Encounters:   07/29/24 105.1 kg (231 lb 12.8 oz)   06/18/24 106.1 kg (234 lb)   06/08/24 108.9 kg (240 lb)                      Review of Systems  Constitutional, HEENT, cardiovascular, pulmonary, gi and gu systems are negative, except as otherwise noted.     Objective    Exam  /84   Pulse 70   Temp 97.5  F (36.4  C) (Temporal)   Resp 18   Ht 1.749 m (5' 8.86\")   Wt 105.1 kg (231 lb 12.8 oz)   LMP  (LMP Unknown)   SpO2 94%   Breastfeeding No   BMI 34.37 kg/m     Estimated body mass index is 34.37 kg/m  as calculated from the following:    Height as of this encounter: 1.749 m (5' 8.86\").    Weight as of this encounter: 105.1 kg (231 lb 12.8 oz).    Physical Exam  GENERAL: alert and no distress  EYES: Eyes grossly normal to inspection  HENT: ear canals and TM's normal, nose and mouth without ulcers or lesions  NECK: no adenopathy, no asymmetry, masses, or scars  RESP: lungs clear to auscultation - no rales, rhonchi or wheezes  CV: regular rate and rhythm, normal S1 S2, no S3 or S4, no murmur, click or " rub, no peripheral edema  MS: no gross musculoskeletal defects noted, no edema  SKIN: no suspicious lesions or rashes  NEURO: Normal strength and tone, mentation intact and speech normal  PSYCH: mentation appears normal, affect normal/bright        Signed Electronically by: Wei Ybarra PA-C

## 2024-07-29 NOTE — PATIENT INSTRUCTIONS
Patient Education   Preventive Care Advice   This is general advice given by our system to help you stay healthy. However, your care team may have specific advice just for you. Please talk to your care team about your preventive care needs.  Nutrition  Eat 5 or more servings of fruits and vegetables each day.  Try wheat bread, brown rice and whole grain pasta (instead of white bread, rice, and pasta).  Get enough calcium and vitamin D. Check the label on foods and aim for 100% of the RDA (recommended daily allowance).  Lifestyle  Exercise at least 150 minutes each week  (30 minutes a day, 5 days a week).  Do muscle strengthening activities 2 days a week. These help control your weight and prevent disease.  No smoking.  Wear sunscreen to prevent skin cancer.  Have a dental exam and cleaning every 6 months.  Yearly exams  See your health care team every year to talk about:  Any changes in your health.  Any medicines your care team has prescribed.  Preventive care, family planning, and ways to prevent chronic diseases.  Shots (vaccines)   HPV shots (up to age 26), if you've never had them before.  Hepatitis B shots (up to age 59), if you've never had them before.  COVID-19 shot: Get this shot when it's due.  Flu shot: Get a flu shot every year.  Tetanus shot: Get a tetanus shot every 10 years.  Pneumococcal, hepatitis A, and RSV shots: Ask your care team if you need these based on your risk.  Shingles shot (for age 50 and up)  General health tests  Diabetes screening:  Starting at age 35, Get screened for diabetes at least every 3 years.  If you are younger than age 35, ask your care team if you should be screened for diabetes.  Cholesterol test: At age 39, start having a cholesterol test every 5 years, or more often if advised.  Bone density scan (DEXA): At age 50, ask your care team if you should have this scan for osteoporosis (brittle bones).  Hepatitis C: Get tested at least once in your life.  STIs (sexually  transmitted infections)  Before age 24: Ask your care team if you should be screened for STIs.  After age 24: Get screened for STIs if you're at risk. You are at risk for STIs (including HIV) if:  You are sexually active with more than one person.  You don't use condoms every time.  You or a partner was diagnosed with a sexually transmitted infection.  If you are at risk for HIV, ask about PrEP medicine to prevent HIV.  Get tested for HIV at least once in your life, whether you are at risk for HIV or not.  Cancer screening tests  Cervical cancer screening: If you have a cervix, begin getting regular cervical cancer screening tests starting at age 21.  Breast cancer scan (mammogram): If you've ever had breasts, begin having regular mammograms starting at age 40. This is a scan to check for breast cancer.  Colon cancer screening: It is important to start screening for colon cancer at age 45.  Have a colonoscopy test every 10 years (or more often if you're at risk) Or, ask your provider about stool tests like a FIT test every year or Cologuard test every 3 years.  To learn more about your testing options, visit:   .  For help making a decision, visit:   https://bit.ly/og47108.  Prostate cancer screening test: If you have a prostate, ask your care team if a prostate cancer screening test (PSA) at age 55 is right for you.  Lung cancer screening: If you are a current or former smoker ages 50 to 80, ask your care team if ongoing lung cancer screenings are right for you.  For informational purposes only. Not to replace the advice of your health care provider. Copyright   2023 [facility]. All rights reserved. Clinically reviewed by the [facility]. [employer] 487664 - REV 01/24.  9 Ways to Cut Back on Drinking  Maybe you've found yourself drinking more alcohol than you'd prefer. If you want to cut back, here are some ideas to try.    Think before you drink.  Do you really want a drink,  "or is it just a habit? If you're used to having a drink at a certain time, try doing something else then.     Look for substitutes.  Find some no-alcohol drinks that you enjoy, like flavored seltzer water, tea with honey, or tonic with a slice of lime. Or try alcohol-free beer or \"virgin\" cocktails (without the alcohol).     Drink more water.  Use water to quench your thirst. Drink a glass of water before you have any alcohol. Have another glass along with every drink or between drinks.     Shrink your drink.  For example, have a bottle of beer instead of a pint. Use a smaller glass for wine. Choose drinks with lower alcohol content (ABV%). Or use less liquor and more mixer in cocktails.     Slow down.  It's easy to drink quickly and without thinking about it. Pay attention, and make each drink last longer.     Do the math.  Total up how much you spend on alcohol each month. How much is that a year? If you cut back, what could you do with the money you save?     Take a break.  Choose a day or two each week when you won't drink at all. Notice how you feel on those days, physically and emotionally. How did you sleep? Do you feel better? Over time, add more break days.     Count calories.  Would you like to lose some weight? For some people that's a good motivator for cutting back. Figure out how many calories are in each drink. How many does that add up to in a day? In a week? In a month?     Practice saying no.  Be ready when someone offers you a drink. Try: \"Thanks, I've had enough.\" Or \"Thanks, but I'm cutting back.\" Or \"No, thanks. I feel better when I drink less.\"   Current as of: November 15, 2023               Content Version: 14.0    2693-4555 [employer].   Care instructions adapted under license by your healthcare professional. If you have questions about a medical condition or this instruction, always ask your healthcare professional. [employer] disclaims any warranty or liability " for your use of this information.    Substance Use Disorder: Care Instructions  Overview     You can improve your life and health by stopping your use of alcohol or drugs. When you don't drink or use drugs, you may feel and sleep better. You may get along better with your family, friends, and coworkers. There are medicines and programs that can help with substance use disorder.  How can you care for yourself at home?  Here are some ways to help you stay sober and prevent relapse.  If you have been given medicine to help keep you sober or reduce your cravings, be sure to take it exactly as prescribed.  Talk to your doctor about programs that can help you stop using drugs or drinking alcohol.  Do not keep alcohol or drugs in your home.  Plan ahead. Think about what you'll say if other people ask you to drink or use drugs. Try not to spend time with people who drink or use drugs.  Use the time and money spent on drinking or drugs to do something that's important to you.  Preventing a relapse  Have a plan to deal with relapse. Learn to recognize changes in your thinking that lead you to drink or use drugs. Get help before you start to drink or use drugs again.  Try to stay away from situations, friends, or places that may lead you to drink or use drugs.  If you feel the need to drink alcohol or use drugs again, seek help right away. Call a trusted friend or family member. Some people get support from organizations such as Narcotics Anonymous or [employer] or from treatment facilities.  If you relapse, get help as soon as you can. Some people make a plan with another person that outlines what they want that person to do for them if they relapse. The plan usually includes how to handle the relapse and who to notify in case of relapse.  Don't give up. Remember that a relapse doesn't mean that you have failed. Use the experience to learn the triggers that lead you to drink or use drugs. Then quit again. Recovery is a  "lifelong process. Many people have several relapses before they are able to quit for good.  Follow-up care is a key part of your treatment and safety. Be sure to make and go to all appointments, and call your doctor if you are having problems. It's also a good idea to know your test results and keep a list of the medicines you take.  When should you call for help?   Call 911  anytime you think you may need emergency care. For example, call if you or someone else:    Has overdosed or has withdrawal signs. Be sure to tell the emergency workers that you are or someone else is using or trying to quit using drugs. Overdose or withdrawal signs may include:  Losing consciousness.  Seizure.  Seeing or hearing things that aren't there (hallucinations).     Is thinking or talking about suicide or harming others.   Where to get help 24 hours a day, 7 days a week   If you or someone you know talks about suicide, self-harm, a mental health crisis, a substance use crisis, or any other kind of emotional distress, get help right away. You can:    Call the Suicide and Crisis Lifeline at 988.     Call 5-627-643-ZQMC (1-830.390.1160).     Text HOME to 912337 to access the Crisis Text Line.   Consider saving these numbers in your phone.  Go to [email or web address] for more information or to chat online.  Call your doctor now or seek immediate medical care if:    You are having withdrawal symptoms. These may include nausea or vomiting, sweating, shakiness, and anxiety.   Watch closely for changes in your health, and be sure to contact your doctor if:    You have a relapse.     You need more help or support to stop.   Where can you learn more?  Go to https://www.healthwise.net/patiented  Enter H573 in the search box to learn more about \"Substance Use Disorder: Care Instructions.\"  Current as of: November 15, 2023               Content Version: 14.0    7332-0340 [employer].   Care instructions adapted under license by your " healthcare professional. If you have questions about a medical condition or this instruction, always ask your healthcare professional. [employer] disclaims any warranty or liability for your use of this information.

## 2024-12-18 ENCOUNTER — PATIENT OUTREACH (OUTPATIENT)
Dept: CARE COORDINATION | Facility: CLINIC | Age: 59
End: 2024-12-18
Payer: COMMERCIAL

## 2025-02-27 ENCOUNTER — ANCILLARY PROCEDURE (OUTPATIENT)
Dept: MAMMOGRAPHY | Facility: CLINIC | Age: 60
End: 2025-02-27
Attending: PHYSICIAN ASSISTANT
Payer: COMMERCIAL

## 2025-02-27 DIAGNOSIS — Z12.31 VISIT FOR SCREENING MAMMOGRAM: ICD-10-CM

## 2025-08-11 DIAGNOSIS — Z12.11 COLON CANCER SCREENING: ICD-10-CM

## 2025-08-25 DIAGNOSIS — F41.9 ANXIETY: ICD-10-CM

## 2025-08-25 RX ORDER — SERTRALINE HYDROCHLORIDE 100 MG/1
100 TABLET, FILM COATED ORAL DAILY
Qty: 90 TABLET | Refills: 3 | Status: SHIPPED | OUTPATIENT
Start: 2025-08-25